# Patient Record
Sex: FEMALE | Race: WHITE | NOT HISPANIC OR LATINO | Employment: OTHER | URBAN - METROPOLITAN AREA
[De-identification: names, ages, dates, MRNs, and addresses within clinical notes are randomized per-mention and may not be internally consistent; named-entity substitution may affect disease eponyms.]

---

## 2023-10-02 ENCOUNTER — LAB (OUTPATIENT)
Dept: LAB | Facility: LAB | Age: 83
End: 2023-10-02
Payer: MEDICARE

## 2023-10-02 DIAGNOSIS — I10 ESSENTIAL (PRIMARY) HYPERTENSION: Primary | ICD-10-CM

## 2023-10-02 DIAGNOSIS — E11.9 TYPE 2 DIABETES MELLITUS WITHOUT COMPLICATIONS (MULTI): ICD-10-CM

## 2023-10-02 DIAGNOSIS — E78.5 HYPERLIPIDEMIA, UNSPECIFIED: ICD-10-CM

## 2023-10-02 LAB
ALBUMIN SERPL BCP-MCNC: 4.4 G/DL (ref 3.4–5)
ALP SERPL-CCNC: 81 U/L (ref 33–136)
ALT SERPL W P-5'-P-CCNC: 12 U/L (ref 7–45)
ANION GAP SERPL CALC-SCNC: 12 MMOL/L (ref 10–20)
AST SERPL W P-5'-P-CCNC: 17 U/L (ref 9–39)
BILIRUB SERPL-MCNC: 0.5 MG/DL (ref 0–1.2)
BUN SERPL-MCNC: 18 MG/DL (ref 6–23)
CALCIUM SERPL-MCNC: 9.3 MG/DL (ref 8.6–10.3)
CHLORIDE SERPL-SCNC: 96 MMOL/L (ref 98–107)
CHOLEST SERPL-MCNC: 224 MG/DL (ref 0–199)
CHOLESTEROL/HDL RATIO: 3.1
CO2 SERPL-SCNC: 31 MMOL/L (ref 21–32)
CREAT SERPL-MCNC: 0.63 MG/DL (ref 0.5–1.05)
ERYTHROCYTE [DISTWIDTH] IN BLOOD BY AUTOMATED COUNT: 13.2 % (ref 11.5–14.5)
GFR SERPL CREATININE-BSD FRML MDRD: 88 ML/MIN/1.73M*2
GLUCOSE SERPL-MCNC: 77 MG/DL (ref 74–99)
HCT VFR BLD AUTO: 40.9 % (ref 36–46)
HDLC SERPL-MCNC: 71.7 MG/DL
HGB BLD-MCNC: 13.2 G/DL (ref 12–16)
LDLC SERPL CALC-MCNC: 133 MG/DL (ref 140–190)
MCH RBC QN AUTO: 29 PG (ref 26–34)
MCHC RBC AUTO-ENTMCNC: 32.3 G/DL (ref 32–36)
MCV RBC AUTO: 90 FL (ref 80–100)
NON HDL CHOLESTEROL: 152 MG/DL (ref 0–149)
NRBC BLD-RTO: 0 /100 WBCS (ref 0–0)
PLATELET # BLD AUTO: 208 X10*3/UL (ref 150–450)
PMV BLD AUTO: 10.6 FL (ref 7.5–11.5)
POTASSIUM SERPL-SCNC: 4.3 MMOL/L (ref 3.5–5.3)
PROT SERPL-MCNC: 7 G/DL (ref 6.4–8.2)
RBC # BLD AUTO: 4.55 X10*6/UL (ref 4–5.2)
SODIUM SERPL-SCNC: 135 MMOL/L (ref 136–145)
TRIGL SERPL-MCNC: 97 MG/DL (ref 0–149)
VLDL: 19 MG/DL (ref 0–40)
WBC # BLD AUTO: 9.3 X10*3/UL (ref 4.4–11.3)

## 2023-10-02 PROCEDURE — 36415 COLL VENOUS BLD VENIPUNCTURE: CPT

## 2023-10-03 LAB — TSH SERPL-ACNC: 2.03 MIU/L (ref 0.44–3.98)

## 2024-06-18 ENCOUNTER — TELEPHONE (OUTPATIENT)
Dept: PRIMARY CARE | Facility: CLINIC | Age: 84
End: 2024-06-18
Payer: MEDICARE

## 2024-06-18 NOTE — TELEPHONE ENCOUNTER
Rachel called and said that her hands have been numb for years.  Over the weekend at [facility] she had to play the piano instead of the keyboard and her hands are on fire now.  She kept saying her hands and fingers are on fire.  Not sure if we should send her to a specialty?  Or schedule with you?    I did advise the patient to go to urgent care if the pain was unbearable.

## 2024-06-19 ENCOUNTER — HOSPITAL ENCOUNTER (EMERGENCY)
Facility: HOSPITAL | Age: 84
Discharge: HOME | End: 2024-06-19
Payer: MEDICARE

## 2024-06-19 VITALS
HEIGHT: 65 IN | TEMPERATURE: 98.1 F | WEIGHT: 154 LBS | RESPIRATION RATE: 18 BRPM | BODY MASS INDEX: 25.66 KG/M2 | OXYGEN SATURATION: 95 % | HEART RATE: 90 BPM | SYSTOLIC BLOOD PRESSURE: 153 MMHG | DIASTOLIC BLOOD PRESSURE: 94 MMHG

## 2024-06-19 DIAGNOSIS — G56.02 CARPAL TUNNEL SYNDROME OF LEFT WRIST: Primary | ICD-10-CM

## 2024-06-19 PROCEDURE — 99282 EMERGENCY DEPT VISIT SF MDM: CPT

## 2024-06-19 ASSESSMENT — PAIN - FUNCTIONAL ASSESSMENT: PAIN_FUNCTIONAL_ASSESSMENT: 0-10

## 2024-06-19 ASSESSMENT — COLUMBIA-SUICIDE SEVERITY RATING SCALE - C-SSRS
2. HAVE YOU ACTUALLY HAD ANY THOUGHTS OF KILLING YOURSELF?: NO
1. IN THE PAST MONTH, HAVE YOU WISHED YOU WERE DEAD OR WISHED YOU COULD GO TO SLEEP AND NOT WAKE UP?: NO
6. HAVE YOU EVER DONE ANYTHING, STARTED TO DO ANYTHING, OR PREPARED TO DO ANYTHING TO END YOUR LIFE?: NO

## 2024-06-19 ASSESSMENT — PAIN SCALES - GENERAL: PAINLEVEL_OUTOF10: 5 - MODERATE PAIN

## 2024-06-19 NOTE — ED PROVIDER NOTES
HPI   Chief Complaint   Patient presents with    Hand Pain     Pt states she has burning in the fingers on her left hand that has been getting worse for a couple days.  States she has a pinched nerve and normally has numbness in the fingers.  Has been using topical cream and tylenol with some relief.        HPI  See my MDM                  Christine Coma Scale Score: 15                     Patient History   No past medical history on file.  Past Surgical History:   Procedure Laterality Date    MR HEAD ANGIO WO IV CONTRAST  7/15/2012    MR HEAD ANGIO WO IV CONTRAST [facility]    MR HEAD ANGIO WO IV CONTRAST  1/11/2013    MR HEAD ANGIO WO IV CONTRAST [facility]     No family history on file.  Social History     Tobacco Use    Smoking status: Not on file    Smokeless tobacco: Not on file   Substance Use Topics    Alcohol use: Not on file    Drug use: Not on file       Physical Exam   ED Triage Vitals [06/19/24 1344]   Temperature Heart Rate Respirations BP   36.7 °C (98.1 °F) 90 18 (!) 153/94      Pulse Ox Temp src Heart Rate Source Patient Position   95 % -- -- --      BP Location FiO2 (%)     -- --       Physical Exam  CONSTITUTIONAL: Vital signs reviewed as charted, well-developed and in no distress  Eyes: Extraocular muscles are intact. Pupils equal round and reactive to light. Conjunctiva are pink.    ENT: Mucous membranes are moist. Tongue in the midline. Pharynx was without erythema or exudates, uvula midline  LUNGS: Breath sounds equal and clear to auscultation. Good air exchange, no wheezes rales or retractions, pulse oximetry is charted.  HEART: Regular rate and rhythm without murmur thrill or rub, strong tones, auscultation is normal.  ABDOMEN: Soft and nontender without guarding rebound rigidity or mass. Bowel sounds are present and normal in all quadrants. There is no palpable masses or aneurysms identified. No hepatosplenomegaly, normal abdominal exam.  Neuro: The patient is awake, alert  and oriented ×3. Moving all 4 extremities and answering questions appropriately.   MUSCULOSKELETAL: There is no ecchymosis edema or obvious deformity of the left upper   Extremity.  Motor sensation pulses are intact distally.  Positive Phalen's test on the left.  PSYCH: Awake alert oriented, normal mood and affect.  Skin:  Dry, normal color, warm to the touch, no rash present.      ED Course & MDM   Diagnoses as of 06/19/24 1453   Carpal tunnel syndrome of left wrist       Medical Decision Making  History obtained from: patient    Vital signs, nursing notes, current medications, past medical history, Surgical history, allergies, social history, family History were reviewed.         HPI:  Patient 84-year-old presenting emergency room today for evaluation of left hand burning sensation.  Speaking deeper with her that the burning sensations in the first second and third fingers.  She normally has tingling in his fingers but after playing the piano for 2 hours on Sunday at [employer] and became a burning sensation.  Denies any traumatic injury.  Denies dizziness, chest pain, shortness of breath, abdominal pain or lower extremity edema.  Nontoxic well-appearing      10 point ROS was reviewed and negative except Noted above in HPI.  DDX: as listed above          MDM Summary/considerations:  Labs Reviewed - No data to display  No orders to display     Medications - No data to display  There are no discharge medications for this patient.      I estimate there is LOW risk for COMPARTMENT SYNDROME, DEEP VENOUS THROMBOSIS, SEPTIC ARTHRITIS, TENDON OR NEUROVASCULAR INJURY, thus I consider the discharge disposition reasonable. We have discussed the diagnosis and risks, and we agree with discharging home to follow-up with their primary doctor or the referral orthopedist. We also discussed returning to the Emergency Department immediately if new or worsening symptoms occur. We have discussed the symptoms which aremost concerning  (e.g., changing or worsening pain, numbness, weakness) that necessitates immediate return.    Patient with a positive Phalen's test consistent with carpal tunnel syndrome.  Placed into a wrist cock-up splint by nursing staff will follow with orthopedic hand surgery referral was given.  Discharged home stable condition.    All of the patient's questions were answered to the best of my ability.  Patient states understanding that they have been screened for an emergency today and we have not found any etiology of symptoms that requires emergent treatment or admission to the hospital at this point. They understand that they have not had definitive care day and require follow-up for treatment of their condition. They also state understanding that they may have an emergent condition that may potentially have not of detected at this visit and they must return to the emergency department if they develop any worsening of symptoms or new complaints.      I have evaluated this patient, my supervising physician was available for consultation.        Critical Care: Not warranted at this time        This chart was completed using voice recognition transcription software. Please excuse any errors of transcription including grammatical, punctuation, syntax and spelling errors.  Please contact me with any questions regarding this chart.    Procedure  Procedures     Levon Kenney, HAMMAD-WILLAM  06/19/24 8278

## 2024-06-19 NOTE — TELEPHONE ENCOUNTER
Called patient and made aware.  She stated that she doesn't have a ride at the time and that her  suggested soaking her hands in ice water.  I reminded her that Dr. Pichardo thought that it would be best if she could get in to urgent care just to make sure that every thing was okay.

## 2024-06-24 ENCOUNTER — HOSPITAL ENCOUNTER (EMERGENCY)
Facility: HOSPITAL | Age: 84
Discharge: HOME | End: 2024-06-24
Attending: STUDENT IN AN ORGANIZED HEALTH CARE EDUCATION/TRAINING PROGRAM
Payer: MEDICARE

## 2024-06-24 ENCOUNTER — APPOINTMENT (OUTPATIENT)
Dept: RADIOLOGY | Facility: HOSPITAL | Age: 84
End: 2024-06-24
Payer: MEDICARE

## 2024-06-24 ENCOUNTER — APPOINTMENT (OUTPATIENT)
Dept: CARDIOLOGY | Facility: HOSPITAL | Age: 84
End: 2024-06-24
Payer: MEDICARE

## 2024-06-24 VITALS
BODY MASS INDEX: 25.66 KG/M2 | HEIGHT: 65 IN | SYSTOLIC BLOOD PRESSURE: 144 MMHG | TEMPERATURE: 97.9 F | RESPIRATION RATE: 16 BRPM | WEIGHT: 154 LBS | HEART RATE: 73 BPM | DIASTOLIC BLOOD PRESSURE: 100 MMHG | OXYGEN SATURATION: 100 %

## 2024-06-24 DIAGNOSIS — G45.9 TIA (TRANSIENT ISCHEMIC ATTACK): Primary | ICD-10-CM

## 2024-06-24 DIAGNOSIS — N39.0 UTI (URINARY TRACT INFECTION) WITH PYURIA: ICD-10-CM

## 2024-06-24 LAB
ALBUMIN SERPL BCP-MCNC: 4.2 G/DL (ref 3.4–5)
ALP SERPL-CCNC: 58 U/L (ref 33–136)
ALT SERPL W P-5'-P-CCNC: 11 U/L (ref 7–45)
ANION GAP SERPL CALC-SCNC: 10 MMOL/L (ref 10–20)
APPEARANCE UR: ABNORMAL
AST SERPL W P-5'-P-CCNC: 14 U/L (ref 9–39)
BASOPHILS # BLD AUTO: 0.04 X10*3/UL (ref 0–0.1)
BASOPHILS NFR BLD AUTO: 0.5 %
BILIRUB SERPL-MCNC: 0.4 MG/DL (ref 0–1.2)
BILIRUB UR STRIP.AUTO-MCNC: NEGATIVE MG/DL
BNP SERPL-MCNC: 306 PG/ML (ref 0–99)
BUN SERPL-MCNC: 24 MG/DL (ref 6–23)
CALCIUM SERPL-MCNC: 9.2 MG/DL (ref 8.6–10.3)
CARDIAC TROPONIN I PNL SERPL HS: 10 NG/L (ref 0–13)
CARDIAC TROPONIN I PNL SERPL HS: 9 NG/L (ref 0–13)
CHLORIDE SERPL-SCNC: 105 MMOL/L (ref 98–107)
CO2 SERPL-SCNC: 29 MMOL/L (ref 21–32)
COLOR UR: ABNORMAL
CREAT SERPL-MCNC: 0.74 MG/DL (ref 0.5–1.05)
EGFRCR SERPLBLD CKD-EPI 2021: 80 ML/MIN/1.73M*2
EOSINOPHIL # BLD AUTO: 0.09 X10*3/UL (ref 0–0.4)
EOSINOPHIL NFR BLD AUTO: 1.1 %
ERYTHROCYTE [DISTWIDTH] IN BLOOD BY AUTOMATED COUNT: 13.2 % (ref 11.5–14.5)
GLUCOSE SERPL-MCNC: 93 MG/DL (ref 74–99)
GLUCOSE UR STRIP.AUTO-MCNC: NORMAL MG/DL
HCT VFR BLD AUTO: 37.6 % (ref 36–46)
HGB BLD-MCNC: 12.1 G/DL (ref 12–16)
IMM GRANULOCYTES # BLD AUTO: 0.03 X10*3/UL (ref 0–0.5)
IMM GRANULOCYTES NFR BLD AUTO: 0.4 % (ref 0–0.9)
KETONES UR STRIP.AUTO-MCNC: NEGATIVE MG/DL
LEUKOCYTE ESTERASE UR QL STRIP.AUTO: ABNORMAL
LYMPHOCYTES # BLD AUTO: 1.9 X10*3/UL (ref 0.8–3)
LYMPHOCYTES NFR BLD AUTO: 23.2 %
MAGNESIUM SERPL-MCNC: 2.18 MG/DL (ref 1.6–2.4)
MCH RBC QN AUTO: 29.1 PG (ref 26–34)
MCHC RBC AUTO-ENTMCNC: 32.2 G/DL (ref 32–36)
MCV RBC AUTO: 90 FL (ref 80–100)
MONOCYTES # BLD AUTO: 0.71 X10*3/UL (ref 0.05–0.8)
MONOCYTES NFR BLD AUTO: 8.7 %
MUCOUS THREADS #/AREA URNS AUTO: ABNORMAL /LPF
NEUTROPHILS # BLD AUTO: 5.42 X10*3/UL (ref 1.6–5.5)
NEUTROPHILS NFR BLD AUTO: 66.1 %
NITRITE UR QL STRIP.AUTO: ABNORMAL
NRBC BLD-RTO: 0 /100 WBCS (ref 0–0)
PH UR STRIP.AUTO: 6.5 [PH]
PLATELET # BLD AUTO: 180 X10*3/UL (ref 150–450)
POTASSIUM SERPL-SCNC: 4.1 MMOL/L (ref 3.5–5.3)
PROT SERPL-MCNC: 6.9 G/DL (ref 6.4–8.2)
PROT UR STRIP.AUTO-MCNC: ABNORMAL MG/DL
RBC # BLD AUTO: 4.16 X10*6/UL (ref 4–5.2)
RBC # UR STRIP.AUTO: ABNORMAL /UL
RBC #/AREA URNS AUTO: ABNORMAL /HPF
SODIUM SERPL-SCNC: 140 MMOL/L (ref 136–145)
SP GR UR STRIP.AUTO: 1.03
SQUAMOUS #/AREA URNS AUTO: ABNORMAL /HPF
UROBILINOGEN UR STRIP.AUTO-MCNC: NORMAL MG/DL
WBC # BLD AUTO: 8.2 X10*3/UL (ref 4.4–11.3)
WBC #/AREA URNS AUTO: ABNORMAL /HPF

## 2024-06-24 PROCEDURE — 96365 THER/PROPH/DIAG IV INF INIT: CPT

## 2024-06-24 PROCEDURE — 36415 COLL VENOUS BLD VENIPUNCTURE: CPT | Performed by: STUDENT IN AN ORGANIZED HEALTH CARE EDUCATION/TRAINING PROGRAM

## 2024-06-24 PROCEDURE — 2500000004 HC RX 250 GENERAL PHARMACY W/ HCPCS (ALT 636 FOR OP/ED): Performed by: STUDENT IN AN ORGANIZED HEALTH CARE EDUCATION/TRAINING PROGRAM

## 2024-06-24 PROCEDURE — 70450 CT HEAD/BRAIN W/O DYE: CPT

## 2024-06-24 PROCEDURE — 81001 URINALYSIS AUTO W/SCOPE: CPT | Performed by: STUDENT IN AN ORGANIZED HEALTH CARE EDUCATION/TRAINING PROGRAM

## 2024-06-24 PROCEDURE — 87086 URINE CULTURE/COLONY COUNT: CPT | Mod: GEALAB | Performed by: STUDENT IN AN ORGANIZED HEALTH CARE EDUCATION/TRAINING PROGRAM

## 2024-06-24 PROCEDURE — 84484 ASSAY OF TROPONIN QUANT: CPT | Performed by: STUDENT IN AN ORGANIZED HEALTH CARE EDUCATION/TRAINING PROGRAM

## 2024-06-24 PROCEDURE — 80053 COMPREHEN METABOLIC PANEL: CPT | Performed by: STUDENT IN AN ORGANIZED HEALTH CARE EDUCATION/TRAINING PROGRAM

## 2024-06-24 PROCEDURE — 83880 ASSAY OF NATRIURETIC PEPTIDE: CPT | Performed by: STUDENT IN AN ORGANIZED HEALTH CARE EDUCATION/TRAINING PROGRAM

## 2024-06-24 PROCEDURE — 93005 ELECTROCARDIOGRAM TRACING: CPT

## 2024-06-24 PROCEDURE — 84484 ASSAY OF TROPONIN QUANT: CPT | Mod: 91 | Performed by: STUDENT IN AN ORGANIZED HEALTH CARE EDUCATION/TRAINING PROGRAM

## 2024-06-24 PROCEDURE — 99285 EMERGENCY DEPT VISIT HI MDM: CPT | Mod: 25

## 2024-06-24 PROCEDURE — 85025 COMPLETE CBC W/AUTO DIFF WBC: CPT | Performed by: STUDENT IN AN ORGANIZED HEALTH CARE EDUCATION/TRAINING PROGRAM

## 2024-06-24 PROCEDURE — 83735 ASSAY OF MAGNESIUM: CPT | Performed by: STUDENT IN AN ORGANIZED HEALTH CARE EDUCATION/TRAINING PROGRAM

## 2024-06-24 PROCEDURE — 70450 CT HEAD/BRAIN W/O DYE: CPT | Performed by: RADIOLOGY

## 2024-06-24 RX ORDER — CEPHALEXIN 500 MG/1
500 CAPSULE ORAL 2 TIMES DAILY
Qty: 14 CAPSULE | Refills: 0 | Status: SHIPPED | OUTPATIENT
Start: 2024-06-24 | End: 2024-07-01

## 2024-06-24 RX ORDER — CEFTRIAXONE 1 G/50ML
1 INJECTION, SOLUTION INTRAVENOUS ONCE
Status: COMPLETED | OUTPATIENT
Start: 2024-06-24 | End: 2024-06-24

## 2024-06-24 ASSESSMENT — LIFESTYLE VARIABLES
HAVE YOU EVER FELT YOU SHOULD CUT DOWN ON YOUR DRINKING: NO
EVER HAD A DRINK FIRST THING IN THE MORNING TO STEADY YOUR NERVES TO GET RID OF A HANGOVER: NO
TOTAL SCORE: 0
HAVE PEOPLE ANNOYED YOU BY CRITICIZING YOUR DRINKING: NO
EVER FELT BAD OR GUILTY ABOUT YOUR DRINKING: NO

## 2024-06-24 ASSESSMENT — PAIN SCALES - GENERAL: PAINLEVEL_OUTOF10: 0 - NO PAIN

## 2024-06-24 ASSESSMENT — PAIN - FUNCTIONAL ASSESSMENT: PAIN_FUNCTIONAL_ASSESSMENT: 0-10

## 2024-06-24 ASSESSMENT — COLUMBIA-SUICIDE SEVERITY RATING SCALE - C-SSRS
2. HAVE YOU ACTUALLY HAD ANY THOUGHTS OF KILLING YOURSELF?: NO
6. HAVE YOU EVER DONE ANYTHING, STARTED TO DO ANYTHING, OR PREPARED TO DO ANYTHING TO END YOUR LIFE?: NO
1. IN THE PAST MONTH, HAVE YOU WISHED YOU WERE DEAD OR WISHED YOU COULD GO TO SLEEP AND NOT WAKE UP?: NO

## 2024-06-24 ASSESSMENT — ACTIVITIES OF DAILY LIVING (ADL): LACK_OF_TRANSPORTATION: NO

## 2024-06-24 NOTE — ED PROVIDER NOTES
HPI   Chief Complaint   Patient presents with    stroke symptoms     Came back from [place] 4 weeks ago and shortly after started having slurred speech and uncontrolled left arm movement.       HPI     Patient is an 84-year-old female presenting to the emergency department today in the setting of strokelike symptoms, fully resolved.  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  No residual deficits from this.  Patient was in [place] until 4 weeks ago when she returned.  She had symptoms of some possible slurred speech which is noted 3 weeks prior and some occasional left arm tremor.  Her outpatient provider recommended coming in for TIA workup.  Her speech deficit has fully resolved.  She denies any pain at this time.  No recent falls, trauma.  No fevers, chills, cough.               Tullos Coma Scale Score: 15                     Patient History   No past medical history on file.  Past Surgical History:   Procedure Laterality Date    MR HEAD ANGIO WO IV CONTRAST  7/15/2012    MR HEAD ANGIO WO IV CONTRAST [facility]    MR HEAD ANGIO WO IV CONTRAST  1/11/2013    MR HEAD ANGIO WO IV CONTRAST [facility]     No family history on file.  Social History     Tobacco Use    Smoking status: Not on file    Smokeless tobacco: Not on file   Substance Use Topics    Alcohol use: Not on file    Drug use: Not on file       Physical Exam   ED Triage Vitals [06/24/24 1403]   Temperature Heart Rate Respirations BP   36.6 °C (97.9 °F) 82 18 139/65      Pulse Ox Temp Source Heart Rate Source Patient Position   96 % Skin Monitor Sitting      BP Location FiO2 (%)     Left arm --       Physical Exam  Vitals and nursing note reviewed.   Constitutional:       General: She is not in acute distress.     Appearance: She is well-developed.   HENT:      Head: Normocephalic and atraumatic.   Eyes:      Conjunctiva/sclera: Conjunctivae normal.   Cardiovascular:      Rate and Rhythm: Normal rate and regular rhythm.       Heart sounds: No murmur heard.  Pulmonary:      Effort: Pulmonary effort is normal. No respiratory distress.      Breath sounds: Normal breath sounds.   Abdominal:      Palpations: Abdomen is soft.      Tenderness: There is no abdominal tenderness.   Musculoskeletal:         General: No swelling.      Cervical back: Neck supple.   Skin:     General: Skin is warm and dry.      Capillary Refill: Capillary refill takes less than 2 seconds.   Neurological:      General: No focal deficit present.      Mental Status: She is alert and oriented to person, place, and time.      Cranial Nerves: No cranial nerve deficit.      Sensory: No sensory deficit.      Motor: No weakness.      Coordination: Coordination normal.   Psychiatric:         Mood and Affect: Mood normal.         ED Course & MDM   Diagnoses as of 06/24/24 2151   TIA (transient ischemic attack)   UTI (urinary tract infection) with pyuria       Medical Decision Making  84-year-old female presenting as above pain arrival hemodynamically stable no acute distress on bedside assessment alert and oriented x 3 and conversant.  Anxious scale of 0.  Distractible tremor to the left arm.  Will plan for an evaluation for presenting complaints and a TIA workup including cardiac enzymes, EKG, CT head, urinalysis.  Pending results given resolution of symptoms we will discuss options for disposition.  Patient is established with notes outpatient PCP.    EKG is interpreted by myself: Rate 75, , QRS 72, QTc 453.  No STEMI or ischemic changes noted.  Impression normal sinus rhythm.    Patient had a full workup including CT, labs.  Notable findings for a urinary tract infection given a first dose of IV antibiotics here.  Discussed a course of treatment and outpatient follow-up.  Given full resolution of symptoms otherwise at this time I do believe she is appropriate for home-going.  If she has any recurrence of symptoms she should have prompt reevaluation.  Would expect  improvement of some of h with a course of antibiotics.  Patient and patient's daughter at bedside are in agreement with plan.  Will be discharged home.    Procedure  Procedures     She Kirby MD  06/24/24 9529

## 2024-06-24 NOTE — PROGRESS NOTES
06/24/24 1534   Discharge Planning   Living Arrangements Alone   Support Systems Children   Assistance Needed A&OX3; independent with ADLs with rollator; drives but not recently; room air baseline and currently room air   Type of Residence Private residence   Number of Stairs to Enter Residence 7   Number of Stairs Within Residence 0   Do you have animals or pets at home? No   Who is requesting discharge planning? Provider   Patient expects to be discharged to: Patient is open to [facility] if recommended.   Does the patient need discharge transport arranged? Yes   RoundTrip coordination needed? Yes   Has discharge transport been arranged? No   Financial Resource Strain   How hard is it for you to pay for the very basics like food, housing, medical care, and heating? Not hard   Housing Stability   In the last 12 months, was there a time when you were not able to pay the mortgage or rent on time? N   In the last 12 months, how many places have you lived? 1   In the last 12 months, was there a time when you did not have a steady place to sleep or slept in a shelter (including now)? N   Transportation Needs   In the past 12 months, has lack of transportation kept you from medical appointments or from getting medications? no   In the past 12 months, has lack of transportation kept you from meetings, work, or from getting things needed for daily living? No     06/24/2024 1537pm  Spoke with patient and patient's daughter in law (Tova) bedside in ED.

## 2024-06-25 LAB — HOLD SPECIMEN: NORMAL

## 2024-06-27 ENCOUNTER — LAB (OUTPATIENT)
Dept: LAB | Facility: LAB | Age: 84
End: 2024-06-27
Payer: MEDICARE

## 2024-06-27 DIAGNOSIS — I63.10 CEREBRAL INFARCTION DUE TO EMBOLISM OF UNSPECIFIED PRECEREBRAL ARTERY (MULTI): Primary | ICD-10-CM

## 2024-06-27 LAB
ALBUMIN SERPL BCP-MCNC: 4.3 G/DL (ref 3.4–5)
ALP SERPL-CCNC: 60 U/L (ref 33–136)
ALT SERPL W P-5'-P-CCNC: 11 U/L (ref 7–45)
ANION GAP SERPL CALC-SCNC: 11 MMOL/L (ref 10–20)
AST SERPL W P-5'-P-CCNC: 13 U/L (ref 9–39)
BACTERIA UR CULT: ABNORMAL
BASOPHILS # BLD AUTO: 0.06 X10*3/UL (ref 0–0.1)
BASOPHILS NFR BLD AUTO: 1 %
BILIRUB SERPL-MCNC: 0.5 MG/DL (ref 0–1.2)
BUN SERPL-MCNC: 20 MG/DL (ref 6–23)
CALCIUM SERPL-MCNC: 9.3 MG/DL (ref 8.6–10.3)
CHLORIDE SERPL-SCNC: 105 MMOL/L (ref 98–107)
CHOLEST SERPL-MCNC: 201 MG/DL (ref 0–199)
CHOLESTEROL/HDL RATIO: 3.2
CO2 SERPL-SCNC: 28 MMOL/L (ref 21–32)
CREAT SERPL-MCNC: 0.66 MG/DL (ref 0.5–1.05)
EGFRCR SERPLBLD CKD-EPI 2021: 87 ML/MIN/1.73M*2
EOSINOPHIL # BLD AUTO: 0.15 X10*3/UL (ref 0–0.4)
EOSINOPHIL NFR BLD AUTO: 2.5 %
ERYTHROCYTE [DISTWIDTH] IN BLOOD BY AUTOMATED COUNT: 13.5 % (ref 11.5–14.5)
EST. AVERAGE GLUCOSE BLD GHB EST-MCNC: 105 MG/DL
GLUCOSE SERPL-MCNC: 96 MG/DL (ref 74–99)
HBA1C MFR BLD: 5.3 %
HCT VFR BLD AUTO: 39.7 % (ref 36–46)
HDLC SERPL-MCNC: 62.7 MG/DL
HGB BLD-MCNC: 12.8 G/DL (ref 12–16)
IMM GRANULOCYTES # BLD AUTO: 0.01 X10*3/UL (ref 0–0.5)
IMM GRANULOCYTES NFR BLD AUTO: 0.2 % (ref 0–0.9)
LDLC SERPL CALC-MCNC: 115 MG/DL
LYMPHOCYTES # BLD AUTO: 1.51 X10*3/UL (ref 0.8–3)
LYMPHOCYTES NFR BLD AUTO: 24.7 %
MCH RBC QN AUTO: 29.6 PG (ref 26–34)
MCHC RBC AUTO-ENTMCNC: 32.2 G/DL (ref 32–36)
MCV RBC AUTO: 92 FL (ref 80–100)
MONOCYTES # BLD AUTO: 0.58 X10*3/UL (ref 0.05–0.8)
MONOCYTES NFR BLD AUTO: 9.5 %
NEUTROPHILS # BLD AUTO: 3.8 X10*3/UL (ref 1.6–5.5)
NEUTROPHILS NFR BLD AUTO: 62.1 %
NON HDL CHOLESTEROL: 138 MG/DL (ref 0–149)
NRBC BLD-RTO: 0 /100 WBCS (ref 0–0)
PLATELET # BLD AUTO: 175 X10*3/UL (ref 150–450)
POTASSIUM SERPL-SCNC: 4.1 MMOL/L (ref 3.5–5.3)
PROT SERPL-MCNC: 6.4 G/DL (ref 6.4–8.2)
RBC # BLD AUTO: 4.33 X10*6/UL (ref 4–5.2)
SODIUM SERPL-SCNC: 140 MMOL/L (ref 136–145)
TREPONEMA PALLIDUM IGG+IGM AB [PRESENCE] IN SERUM OR PLASMA BY IMMUNOASSAY: NONREACTIVE
TRIGL SERPL-MCNC: 118 MG/DL (ref 0–149)
TSH SERPL-ACNC: 2.19 MIU/L (ref 0.44–3.98)
VIT B12 SERPL-MCNC: 480 PG/ML (ref 211–911)
VLDL: 24 MG/DL (ref 0–40)
WBC # BLD AUTO: 6.1 X10*3/UL (ref 4.4–11.3)

## 2024-06-27 PROCEDURE — 83036 HEMOGLOBIN GLYCOSYLATED A1C: CPT

## 2024-06-27 PROCEDURE — 82607 VITAMIN B-12: CPT

## 2024-06-27 PROCEDURE — 84443 ASSAY THYROID STIM HORMONE: CPT

## 2024-06-27 PROCEDURE — 85025 COMPLETE CBC W/AUTO DIFF WBC: CPT

## 2024-06-27 PROCEDURE — 86780 TREPONEMA PALLIDUM: CPT

## 2024-06-27 PROCEDURE — 80053 COMPREHEN METABOLIC PANEL: CPT

## 2024-06-27 PROCEDURE — 80061 LIPID PANEL: CPT

## 2024-06-27 PROCEDURE — 36415 COLL VENOUS BLD VENIPUNCTURE: CPT

## 2024-06-28 ENCOUNTER — TELEPHONE (OUTPATIENT)
Dept: PHARMACY | Facility: HOSPITAL | Age: 84
End: 2024-06-28
Payer: MEDICARE

## 2024-06-28 ENCOUNTER — TELEPHONE (OUTPATIENT)
Dept: PRIMARY CARE | Facility: CLINIC | Age: 84
End: 2024-06-28
Payer: MEDICARE

## 2024-06-28 PROBLEM — I48.91 ATRIAL FIBRILLATION (MULTI): Status: ACTIVE | Noted: 2024-06-28

## 2024-06-28 PROBLEM — R55 SYNCOPE AND COLLAPSE: Status: ACTIVE | Noted: 2024-06-28

## 2024-06-28 PROBLEM — I10 ESSENTIAL HYPERTENSION: Status: ACTIVE | Noted: 2024-06-28

## 2024-06-28 LAB
ATRIAL RATE: 75 BPM
P AXIS: 61 DEGREES
P OFFSET: 211 MS
P ONSET: 158 MS
PR INTERVAL: 136 MS
Q ONSET: 226 MS
QRS COUNT: 12 BEATS
QRS DURATION: 72 MS
QT INTERVAL: 406 MS
QTC CALCULATION(BAZETT): 453 MS
QTC FREDERICIA: 437 MS
R AXIS: 13 DEGREES
T AXIS: 39 DEGREES
T OFFSET: 429 MS
VENTRICULAR RATE: 75 BPM

## 2024-06-28 RX ORDER — ACETAMINOPHEN 325 MG/1
325 TABLET ORAL EVERY 4 HOURS PRN
COMMUNITY
Start: 2022-10-13

## 2024-06-28 RX ORDER — AMLODIPINE BESYLATE 2.5 MG/1
2.5 TABLET ORAL DAILY
COMMUNITY
Start: 2020-07-07

## 2024-06-28 NOTE — TELEPHONE ENCOUNTER
Dr. Pichardo wrote out a script.  Called daughter and she will be picking up the prescription. Scanned into chart

## 2024-06-28 NOTE — TELEPHONE ENCOUNTER
Patients daughter called asking if you would be willing to write a prescription for her mom for a wheelchair

## 2024-06-28 NOTE — PROGRESS NOTES
EDPD Note: Antibiotics Reviewed and Warranted    Contacted Ms. Sol Gomez regarding a positive urine culture/result that was taken during their recent emergency room visit. I completed education with patient . The patient is being treated appropriately with cephalexin 500 mg BID x 7 days given symptom improvment.     Patient presented to the ED for strokelike symptoms that fully resolved. Did not endorse urinary symptoms in the ED note.   Patient currently reports urine discoloration and occasional retention has improved a bit but she has incontinence so retention can be difficult to gauge. She denies unusual urinary frequency or urgency. Counseled the patient to follow-up with her provider if her symptoms persist/worsen/return.     Susceptibility data from last 90 days.  Collected Specimen Info Organism Ampicillin Cefazolin Cefazolin (uncomplicated UTIs only) Ciprofloxacin Gentamicin Nitrofurantoin Piperacillin/Tazobactam Tobramycin Trimethoprim/Sulfamethoxazole   06/24/24 Urine from Clean Catch/Voided Escherichia coli  S  S  S  S  R  S  S  R  S        No further follow up needed from EDPD Team.     Karyna García, PharmD

## 2024-07-03 ENCOUNTER — APPOINTMENT (OUTPATIENT)
Dept: RADIOLOGY | Facility: HOSPITAL | Age: 84
DRG: 643 | End: 2024-07-03
Payer: MEDICARE

## 2024-07-03 ENCOUNTER — HOSPITAL ENCOUNTER (INPATIENT)
Facility: HOSPITAL | Age: 84
DRG: 643 | End: 2024-07-03
Attending: EMERGENCY MEDICINE | Admitting: INTERNAL MEDICINE
Payer: MEDICARE

## 2024-07-03 ENCOUNTER — APPOINTMENT (OUTPATIENT)
Dept: CARDIOLOGY | Facility: HOSPITAL | Age: 84
DRG: 643 | End: 2024-07-03
Payer: MEDICARE

## 2024-07-03 ENCOUNTER — APPOINTMENT (OUTPATIENT)
Dept: CARDIOLOGY | Facility: CLINIC | Age: 84
End: 2024-07-03
Payer: MEDICARE

## 2024-07-03 DIAGNOSIS — R29.6 FREQUENT FALLS: ICD-10-CM

## 2024-07-03 DIAGNOSIS — I49.3 FREQUENT PVCS: ICD-10-CM

## 2024-07-03 DIAGNOSIS — R55 SYNCOPE AND COLLAPSE: ICD-10-CM

## 2024-07-03 DIAGNOSIS — E87.1 HYPONATREMIA: Primary | ICD-10-CM

## 2024-07-03 DIAGNOSIS — G93.41 ACUTE METABOLIC ENCEPHALOPATHY: ICD-10-CM

## 2024-07-03 LAB
ALBUMIN SERPL BCP-MCNC: 3.8 G/DL (ref 3.4–5)
ALBUMIN SERPL BCP-MCNC: 4 G/DL (ref 3.4–5)
ALBUMIN SERPL BCP-MCNC: 4.4 G/DL (ref 3.4–5)
ALP SERPL-CCNC: 63 U/L (ref 33–136)
ALT SERPL W P-5'-P-CCNC: 14 U/L (ref 7–45)
ANION GAP SERPL CALC-SCNC: 12 MMOL/L (ref 10–20)
ANION GAP SERPL CALC-SCNC: 13 MMOL/L (ref 10–20)
ANION GAP SERPL CALC-SCNC: 14 MMOL/L (ref 10–20)
AORTIC VALVE MEAN GRADIENT: 5.4 MMHG
AORTIC VALVE PEAK VELOCITY: 1.57 M/S
APPEARANCE UR: ABNORMAL
AST SERPL W P-5'-P-CCNC: 18 U/L (ref 9–39)
ATRIAL RATE: 78 BPM
AV PEAK GRADIENT: 9.8 MMHG
AVA (PEAK VEL): 1.69 CM2
AVA (VTI): 1.44 CM2
BACTERIA #/AREA URNS AUTO: ABNORMAL /HPF
BASOPHILS # BLD AUTO: 0.02 X10*3/UL (ref 0–0.1)
BASOPHILS NFR BLD AUTO: 0.2 %
BILIRUB SERPL-MCNC: 0.7 MG/DL (ref 0–1.2)
BILIRUB UR STRIP.AUTO-MCNC: NEGATIVE MG/DL
BUN SERPL-MCNC: 10 MG/DL (ref 6–23)
BUN SERPL-MCNC: 12 MG/DL (ref 6–23)
BUN SERPL-MCNC: 14 MG/DL (ref 6–23)
CALCIUM SERPL-MCNC: 8.6 MG/DL (ref 8.6–10.3)
CALCIUM SERPL-MCNC: 8.7 MG/DL (ref 8.6–10.3)
CALCIUM SERPL-MCNC: 9.1 MG/DL (ref 8.6–10.3)
CARDIAC TROPONIN I PNL SERPL HS: 8 NG/L (ref 0–13)
CHLORIDE SERPL-SCNC: 89 MMOL/L (ref 98–107)
CHLORIDE SERPL-SCNC: 90 MMOL/L (ref 98–107)
CHLORIDE SERPL-SCNC: 90 MMOL/L (ref 98–107)
CHLORIDE UR-SCNC: 72 MMOL/L
CHLORIDE/CREATININE (MMOL/G) IN URINE: 72 MMOL/G CREAT (ref 38–318)
CO2 SERPL-SCNC: 23 MMOL/L (ref 21–32)
CO2 SERPL-SCNC: 24 MMOL/L (ref 21–32)
CO2 SERPL-SCNC: 27 MMOL/L (ref 21–32)
COLOR UR: ABNORMAL
CREAT SERPL-MCNC: 0.52 MG/DL (ref 0.5–1.05)
CREAT SERPL-MCNC: 0.54 MG/DL (ref 0.5–1.05)
CREAT SERPL-MCNC: 0.63 MG/DL (ref 0.5–1.05)
CREAT UR-MCNC: 99.5 MG/DL (ref 20–320)
EGFRCR SERPLBLD CKD-EPI 2021: 88 ML/MIN/1.73M*2
EGFRCR SERPLBLD CKD-EPI 2021: >90 ML/MIN/1.73M*2
EGFRCR SERPLBLD CKD-EPI 2021: >90 ML/MIN/1.73M*2
EJECTION FRACTION APICAL 4 CHAMBER: 47.1
EJECTION FRACTION: 53 %
EOSINOPHIL # BLD AUTO: 0.01 X10*3/UL (ref 0–0.4)
EOSINOPHIL NFR BLD AUTO: 0.1 %
ERYTHROCYTE [DISTWIDTH] IN BLOOD BY AUTOMATED COUNT: 13 % (ref 11.5–14.5)
GLUCOSE BLD MANUAL STRIP-MCNC: 105 MG/DL (ref 74–99)
GLUCOSE BLD MANUAL STRIP-MCNC: 113 MG/DL (ref 74–99)
GLUCOSE SERPL-MCNC: 110 MG/DL (ref 74–99)
GLUCOSE SERPL-MCNC: 115 MG/DL (ref 74–99)
GLUCOSE SERPL-MCNC: 116 MG/DL (ref 74–99)
GLUCOSE UR STRIP.AUTO-MCNC: NORMAL MG/DL
HCT VFR BLD AUTO: 36.7 % (ref 36–46)
HGB BLD-MCNC: 12.5 G/DL (ref 12–16)
HOLD SPECIMEN: NORMAL
HYALINE CASTS #/AREA URNS AUTO: ABNORMAL /LPF
IMM GRANULOCYTES # BLD AUTO: 0.05 X10*3/UL (ref 0–0.5)
IMM GRANULOCYTES NFR BLD AUTO: 0.5 % (ref 0–0.9)
INR PPP: 1.1 (ref 0.9–1.1)
KETONES UR STRIP.AUTO-MCNC: NEGATIVE MG/DL
LACTATE SERPL-SCNC: 0.8 MMOL/L (ref 0.4–2)
LEFT ATRIUM VOLUME AREA LENGTH INDEX BSA: 17.9 ML/M2
LEFT VENTRICLE INTERNAL DIMENSION DIASTOLE: 5 CM (ref 3.5–6)
LEFT VENTRICULAR OUTFLOW TRACT DIAMETER: 1.9 CM
LEUKOCYTE ESTERASE UR QL STRIP.AUTO: ABNORMAL
LYMPHOCYTES # BLD AUTO: 0.87 X10*3/UL (ref 0.8–3)
LYMPHOCYTES NFR BLD AUTO: 8.4 %
MAGNESIUM SERPL-MCNC: 1.92 MG/DL (ref 1.6–2.4)
MCH RBC QN AUTO: 29.3 PG (ref 26–34)
MCHC RBC AUTO-ENTMCNC: 34.1 G/DL (ref 32–36)
MCV RBC AUTO: 86 FL (ref 80–100)
MITRAL VALVE E/A RATIO: 0.68
MONOCYTES # BLD AUTO: 0.98 X10*3/UL (ref 0.05–0.8)
MONOCYTES NFR BLD AUTO: 9.5 %
MUCOUS THREADS #/AREA URNS AUTO: ABNORMAL /LPF
NEUTROPHILS # BLD AUTO: 8.41 X10*3/UL (ref 1.6–5.5)
NEUTROPHILS NFR BLD AUTO: 81.3 %
NITRITE UR QL STRIP.AUTO: NEGATIVE
NRBC BLD-RTO: 0 /100 WBCS (ref 0–0)
OSMOLALITY UR: 426 MOSM/KG (ref 200–1200)
P AXIS: 52 DEGREES
P OFFSET: 211 MS
P ONSET: 156 MS
PH UR STRIP.AUTO: 6.5 [PH]
PHOSPHATE SERPL-MCNC: 2.9 MG/DL (ref 2.5–4.9)
PHOSPHATE SERPL-MCNC: 3.2 MG/DL (ref 2.5–4.9)
PLATELET # BLD AUTO: 210 X10*3/UL (ref 150–450)
POTASSIUM SERPL-SCNC: 3.9 MMOL/L (ref 3.5–5.3)
POTASSIUM SERPL-SCNC: 4.1 MMOL/L (ref 3.5–5.3)
POTASSIUM SERPL-SCNC: 4.2 MMOL/L (ref 3.5–5.3)
POTASSIUM UR-SCNC: 52 MMOL/L
POTASSIUM/CREAT UR-RTO: 52 MMOL/G CREAT
PR INTERVAL: 140 MS
PROT SERPL-MCNC: 6.9 G/DL (ref 6.4–8.2)
PROT UR STRIP.AUTO-MCNC: ABNORMAL MG/DL
PROTHROMBIN TIME: 12.4 SECONDS (ref 9.8–12.8)
Q ONSET: 226 MS
QRS COUNT: 13 BEATS
QRS DURATION: 70 MS
QT INTERVAL: 414 MS
QTC CALCULATION(BAZETT): 471 MS
QTC FREDERICIA: 451 MS
R AXIS: -25 DEGREES
RBC # BLD AUTO: 4.26 X10*6/UL (ref 4–5.2)
RBC # UR STRIP.AUTO: ABNORMAL /UL
RBC #/AREA URNS AUTO: ABNORMAL /HPF
RIGHT VENTRICLE FREE WALL PEAK S': 13 CM/S
RIGHT VENTRICLE PEAK SYSTOLIC PRESSURE: 6.9 MMHG
SARS-COV-2 RNA RESP QL NAA+PROBE: NOT DETECTED
SODIUM SERPL-SCNC: 122 MMOL/L (ref 136–145)
SODIUM SERPL-SCNC: 123 MMOL/L (ref 136–145)
SODIUM SERPL-SCNC: 125 MMOL/L (ref 136–145)
SODIUM UR-SCNC: 77 MMOL/L
SODIUM/CREAT UR-RTO: 77 MMOL/G CREAT
SP GR UR STRIP.AUTO: 1.01
SQUAMOUS #/AREA URNS AUTO: ABNORMAL /HPF
T AXIS: 31 DEGREES
T OFFSET: 433 MS
UROBILINOGEN UR STRIP.AUTO-MCNC: NORMAL MG/DL
VENTRICULAR RATE: 78 BPM
WBC # BLD AUTO: 10.3 X10*3/UL (ref 4.4–11.3)
WBC #/AREA URNS AUTO: ABNORMAL /HPF
WBC CLUMPS #/AREA URNS AUTO: ABNORMAL /HPF

## 2024-07-03 PROCEDURE — 82436 ASSAY OF URINE CHLORIDE: CPT | Mod: GEALAB

## 2024-07-03 PROCEDURE — 85610 PROTHROMBIN TIME: CPT | Performed by: PHYSICIAN ASSISTANT

## 2024-07-03 PROCEDURE — 83605 ASSAY OF LACTIC ACID: CPT | Performed by: PHYSICIAN ASSISTANT

## 2024-07-03 PROCEDURE — 84484 ASSAY OF TROPONIN QUANT: CPT | Performed by: PHYSICIAN ASSISTANT

## 2024-07-03 PROCEDURE — 85025 COMPLETE CBC W/AUTO DIFF WBC: CPT | Performed by: PHYSICIAN ASSISTANT

## 2024-07-03 PROCEDURE — 36415 COLL VENOUS BLD VENIPUNCTURE: CPT | Performed by: PHYSICIAN ASSISTANT

## 2024-07-03 PROCEDURE — 2500000001 HC RX 250 WO HCPCS SELF ADMINISTERED DRUGS (ALT 637 FOR MEDICARE OP)

## 2024-07-03 PROCEDURE — 93306 TTE W/DOPPLER COMPLETE: CPT | Performed by: STUDENT IN AN ORGANIZED HEALTH CARE EDUCATION/TRAINING PROGRAM

## 2024-07-03 PROCEDURE — 2500000004 HC RX 250 GENERAL PHARMACY W/ HCPCS (ALT 636 FOR OP/ED)

## 2024-07-03 PROCEDURE — 99223 1ST HOSP IP/OBS HIGH 75: CPT

## 2024-07-03 PROCEDURE — 70498 CT ANGIOGRAPHY NECK: CPT

## 2024-07-03 PROCEDURE — 87086 URINE CULTURE/COLONY COUNT: CPT | Mod: GEALAB | Performed by: PHYSICIAN ASSISTANT

## 2024-07-03 PROCEDURE — 93306 TTE W/DOPPLER COMPLETE: CPT

## 2024-07-03 PROCEDURE — 80069 RENAL FUNCTION PANEL: CPT | Mod: CCI

## 2024-07-03 PROCEDURE — G0378 HOSPITAL OBSERVATION PER HR: HCPCS

## 2024-07-03 PROCEDURE — 99285 EMERGENCY DEPT VISIT HI MDM: CPT | Mod: 25

## 2024-07-03 PROCEDURE — 70551 MRI BRAIN STEM W/O DYE: CPT | Performed by: STUDENT IN AN ORGANIZED HEALTH CARE EDUCATION/TRAINING PROGRAM

## 2024-07-03 PROCEDURE — 81001 URINALYSIS AUTO W/SCOPE: CPT | Performed by: PHYSICIAN ASSISTANT

## 2024-07-03 PROCEDURE — 70496 CT ANGIOGRAPHY HEAD: CPT | Performed by: STUDENT IN AN ORGANIZED HEALTH CARE EDUCATION/TRAINING PROGRAM

## 2024-07-03 PROCEDURE — 93005 ELECTROCARDIOGRAM TRACING: CPT

## 2024-07-03 PROCEDURE — 70450 CT HEAD/BRAIN W/O DYE: CPT | Performed by: RADIOLOGY

## 2024-07-03 PROCEDURE — 84550 ASSAY OF BLOOD/URIC ACID: CPT

## 2024-07-03 PROCEDURE — 70450 CT HEAD/BRAIN W/O DYE: CPT

## 2024-07-03 PROCEDURE — 83935 ASSAY OF URINE OSMOLALITY: CPT | Mod: GEALAB

## 2024-07-03 PROCEDURE — 73060 X-RAY EXAM OF HUMERUS: CPT | Mod: LEFT SIDE | Performed by: RADIOLOGY

## 2024-07-03 PROCEDURE — 87635 SARS-COV-2 COVID-19 AMP PRB: CPT | Performed by: PHYSICIAN ASSISTANT

## 2024-07-03 PROCEDURE — 70551 MRI BRAIN STEM W/O DYE: CPT

## 2024-07-03 PROCEDURE — 80053 COMPREHEN METABOLIC PANEL: CPT | Performed by: PHYSICIAN ASSISTANT

## 2024-07-03 PROCEDURE — 2550000001 HC RX 255 CONTRASTS: Performed by: INTERNAL MEDICINE

## 2024-07-03 PROCEDURE — 83930 ASSAY OF BLOOD OSMOLALITY: CPT | Mod: GEALAB

## 2024-07-03 PROCEDURE — 2500000004 HC RX 250 GENERAL PHARMACY W/ HCPCS (ALT 636 FOR OP/ED): Performed by: PHYSICIAN ASSISTANT

## 2024-07-03 PROCEDURE — 83735 ASSAY OF MAGNESIUM: CPT | Performed by: PHYSICIAN ASSISTANT

## 2024-07-03 PROCEDURE — 82947 ASSAY GLUCOSE BLOOD QUANT: CPT

## 2024-07-03 PROCEDURE — 70498 CT ANGIOGRAPHY NECK: CPT | Performed by: STUDENT IN AN ORGANIZED HEALTH CARE EDUCATION/TRAINING PROGRAM

## 2024-07-03 PROCEDURE — 73060 X-RAY EXAM OF HUMERUS: CPT | Mod: LT

## 2024-07-03 PROCEDURE — 96365 THER/PROPH/DIAG IV INF INIT: CPT

## 2024-07-03 PROCEDURE — 84100 ASSAY OF PHOSPHORUS: CPT | Performed by: INTERNAL MEDICINE

## 2024-07-03 RX ORDER — ASPIRIN 81 MG/1
81 TABLET ORAL DAILY
Status: DISCONTINUED | OUTPATIENT
Start: 2024-07-03 | End: 2024-07-03

## 2024-07-03 RX ORDER — CEFTRIAXONE 1 G/50ML
1 INJECTION, SOLUTION INTRAVENOUS ONCE
Status: COMPLETED | OUTPATIENT
Start: 2024-07-03 | End: 2024-07-03

## 2024-07-03 RX ORDER — ACETAMINOPHEN 325 MG/1
650 TABLET ORAL EVERY 6 HOURS PRN
Status: DISCONTINUED | OUTPATIENT
Start: 2024-07-03 | End: 2024-07-08 | Stop reason: HOSPADM

## 2024-07-03 RX ORDER — LORAZEPAM 2 MG/ML
0.5 INJECTION INTRAMUSCULAR ONCE
Status: DISCONTINUED | OUTPATIENT
Start: 2024-07-03 | End: 2024-07-03

## 2024-07-03 RX ORDER — HYDRALAZINE HYDROCHLORIDE 25 MG/1
25 TABLET, FILM COATED ORAL EVERY 6 HOURS PRN
Status: DISCONTINUED | OUTPATIENT
Start: 2024-07-05 | End: 2024-07-08 | Stop reason: HOSPADM

## 2024-07-03 RX ORDER — METHOCARBAMOL 500 MG/1
500 TABLET, FILM COATED ORAL EVERY 8 HOURS PRN
Status: DISCONTINUED | OUTPATIENT
Start: 2024-07-03 | End: 2024-07-04

## 2024-07-03 RX ORDER — SODIUM CHLORIDE 9 MG/ML
75 INJECTION, SOLUTION INTRAVENOUS CONTINUOUS
Status: DISCONTINUED | OUTPATIENT
Start: 2024-07-03 | End: 2024-07-03

## 2024-07-03 RX ORDER — ATORVASTATIN CALCIUM 80 MG/1
80 TABLET, FILM COATED ORAL NIGHTLY
Status: DISCONTINUED | OUTPATIENT
Start: 2024-07-03 | End: 2024-07-08 | Stop reason: HOSPADM

## 2024-07-03 RX ORDER — POLYETHYLENE GLYCOL 3350 17 G/17G
17 POWDER, FOR SOLUTION ORAL DAILY PRN
Status: DISCONTINUED | OUTPATIENT
Start: 2024-07-03 | End: 2024-07-03

## 2024-07-03 RX ORDER — LABETALOL HYDROCHLORIDE 5 MG/ML
10 INJECTION, SOLUTION INTRAVENOUS EVERY 10 MIN PRN
Status: ACTIVE | OUTPATIENT
Start: 2024-07-03 | End: 2024-07-06

## 2024-07-03 RX ORDER — CEFTRIAXONE 1 G/50ML
1 INJECTION, SOLUTION INTRAVENOUS EVERY 24 HOURS
Status: DISCONTINUED | OUTPATIENT
Start: 2024-07-04 | End: 2024-07-05

## 2024-07-03 RX ORDER — DESMOPRESSIN ACETATE 0.1 MG/1
0.1 TABLET ORAL 2 TIMES DAILY
Status: ON HOLD | COMMUNITY
End: 2024-07-08

## 2024-07-03 RX ORDER — OXYCODONE HYDROCHLORIDE 5 MG/1
2.5 TABLET ORAL EVERY 6 HOURS PRN
Status: DISCONTINUED | OUTPATIENT
Start: 2024-07-03 | End: 2024-07-04

## 2024-07-03 RX ORDER — ENOXAPARIN SODIUM 100 MG/ML
40 INJECTION SUBCUTANEOUS EVERY 24 HOURS
Status: DISCONTINUED | OUTPATIENT
Start: 2024-07-03 | End: 2024-07-08 | Stop reason: HOSPADM

## 2024-07-03 RX ORDER — AMLODIPINE BESYLATE 5 MG/1
2.5 TABLET ORAL DAILY
Status: DISCONTINUED | OUTPATIENT
Start: 2024-07-03 | End: 2024-07-03

## 2024-07-03 RX ORDER — HYDRALAZINE HYDROCHLORIDE 20 MG/ML
10 INJECTION INTRAMUSCULAR; INTRAVENOUS
Status: ACTIVE | OUTPATIENT
Start: 2024-07-03 | End: 2024-07-05

## 2024-07-03 RX ORDER — OXYCODONE HYDROCHLORIDE 5 MG/1
5 TABLET ORAL EVERY 6 HOURS PRN
Status: DISCONTINUED | OUTPATIENT
Start: 2024-07-03 | End: 2024-07-04

## 2024-07-03 RX ORDER — SODIUM CHLORIDE 9 MG/ML
75 INJECTION, SOLUTION INTRAVENOUS CONTINUOUS
Status: DISCONTINUED | OUTPATIENT
Start: 2024-07-03 | End: 2024-07-04

## 2024-07-03 RX ORDER — DESMOPRESSIN ACETATE 0.1 MG/1
0.1 TABLET ORAL 2 TIMES DAILY
Status: DISCONTINUED | OUTPATIENT
Start: 2024-07-03 | End: 2024-07-05

## 2024-07-03 SDOH — SOCIAL STABILITY: SOCIAL INSECURITY: ABUSE: ADULT

## 2024-07-03 SDOH — SOCIAL STABILITY: SOCIAL INSECURITY: HAVE YOU HAD ANY THOUGHTS OF HARMING ANYONE ELSE?: NO

## 2024-07-03 SDOH — SOCIAL STABILITY: SOCIAL INSECURITY: HAS ANYONE EVER THREATENED TO HURT YOUR FAMILY OR YOUR PETS?: NO

## 2024-07-03 SDOH — SOCIAL STABILITY: SOCIAL INSECURITY: ARE YOU OR HAVE YOU BEEN THREATENED OR ABUSED PHYSICALLY, EMOTIONALLY, OR SEXUALLY BY ANYONE?: NO

## 2024-07-03 SDOH — SOCIAL STABILITY: SOCIAL INSECURITY: WERE YOU ABLE TO COMPLETE ALL THE BEHAVIORAL HEALTH SCREENINGS?: YES

## 2024-07-03 SDOH — SOCIAL STABILITY: SOCIAL INSECURITY: ARE THERE ANY APPARENT SIGNS OF INJURIES/BEHAVIORS THAT COULD BE RELATED TO ABUSE/NEGLECT?: NO

## 2024-07-03 SDOH — SOCIAL STABILITY: SOCIAL INSECURITY: HAVE YOU HAD THOUGHTS OF HARMING ANYONE ELSE?: NO

## 2024-07-03 SDOH — SOCIAL STABILITY: SOCIAL INSECURITY: DOES ANYONE TRY TO KEEP YOU FROM HAVING/CONTACTING OTHER FRIENDS OR DOING THINGS OUTSIDE YOUR HOME?: NO

## 2024-07-03 SDOH — SOCIAL STABILITY: SOCIAL INSECURITY: DO YOU FEEL ANYONE HAS EXPLOITED OR TAKEN ADVANTAGE OF YOU FINANCIALLY OR OF YOUR PERSONAL PROPERTY?: NO

## 2024-07-03 SDOH — SOCIAL STABILITY: SOCIAL INSECURITY: DO YOU FEEL UNSAFE GOING BACK TO THE PLACE WHERE YOU ARE LIVING?: NO

## 2024-07-03 ASSESSMENT — ENCOUNTER SYMPTOMS
DIZZINESS: 0
CONFUSION: 0
ADENOPATHY: 0
COLOR CHANGE: 0
DIARRHEA: 0
NAUSEA: 0
NUMBNESS: 1
DIFFICULTY URINATING: 0
WEAKNESS: 0
FLANK PAIN: 0
ABDOMINAL PAIN: 0
AGITATION: 0
ABDOMINAL DISTENTION: 0
SINUS PAIN: 0
NECK STIFFNESS: 0
LIGHT-HEADEDNESS: 0
FEVER: 0
SHORTNESS OF BREATH: 0
PALPITATIONS: 0
CHILLS: 0
NECK PAIN: 0
CHEST TIGHTNESS: 0
WHEEZING: 0
CONSTIPATION: 0

## 2024-07-03 ASSESSMENT — PATIENT HEALTH QUESTIONNAIRE - PHQ9
2. FEELING DOWN, DEPRESSED OR HOPELESS: NOT AT ALL
SUM OF ALL RESPONSES TO PHQ9 QUESTIONS 1 & 2: 0
1. LITTLE INTEREST OR PLEASURE IN DOING THINGS: NOT AT ALL

## 2024-07-03 ASSESSMENT — ACTIVITIES OF DAILY LIVING (ADL)
TOILETING: NEEDS ASSISTANCE
HEARING - LEFT EAR: HEARING AID
BATHING: NEEDS ASSISTANCE
GROOMING: NEEDS ASSISTANCE
FEEDING YOURSELF: NEEDS ASSISTANCE
ADEQUATE_TO_COMPLETE_ADL: YES
WALKS IN HOME: NEEDS ASSISTANCE
PATIENT'S MEMORY ADEQUATE TO SAFELY COMPLETE DAILY ACTIVITIES?: YES
ASSISTIVE_DEVICE: HEARING AID - LEFT;HEARING AID - RIGHT
JUDGMENT_ADEQUATE_SAFELY_COMPLETE_DAILY_ACTIVITIES: YES
HEARING - RIGHT EAR: HEARING AID
DRESSING YOURSELF: NEEDS ASSISTANCE

## 2024-07-03 ASSESSMENT — LIFESTYLE VARIABLES
EVER HAD A DRINK FIRST THING IN THE MORNING TO STEADY YOUR NERVES TO GET RID OF A HANGOVER: NO
AUDIT-C TOTAL SCORE: 0
AUDIT-C TOTAL SCORE: 0
HAVE PEOPLE ANNOYED YOU BY CRITICIZING YOUR DRINKING: NO
HAVE YOU EVER FELT YOU SHOULD CUT DOWN ON YOUR DRINKING: NO
SKIP TO QUESTIONS 9-10: 1
HOW MANY STANDARD DRINKS CONTAINING ALCOHOL DO YOU HAVE ON A TYPICAL DAY: PATIENT DOES NOT DRINK
HOW OFTEN DO YOU HAVE 6 OR MORE DRINKS ON ONE OCCASION: NEVER
HOW OFTEN DO YOU HAVE A DRINK CONTAINING ALCOHOL: NEVER
EVER FELT BAD OR GUILTY ABOUT YOUR DRINKING: NO
TOTAL SCORE: 0

## 2024-07-03 ASSESSMENT — COGNITIVE AND FUNCTIONAL STATUS - GENERAL
DRESSING REGULAR LOWER BODY CLOTHING: A LITTLE
PERSONAL GROOMING: A LOT
TOILETING: A LOT
TURNING FROM BACK TO SIDE WHILE IN FLAT BAD: A LITTLE
TURNING FROM BACK TO SIDE WHILE IN FLAT BAD: A LOT
EATING MEALS: A LITTLE
MOBILITY SCORE: 11
EATING MEALS: A LITTLE
PATIENT BASELINE BEDBOUND: NO
HELP NEEDED FOR BATHING: A LITTLE
DRESSING REGULAR LOWER BODY CLOTHING: A LOT
WALKING IN HOSPITAL ROOM: A LOT
DRESSING REGULAR UPPER BODY CLOTHING: A LITTLE
MOVING TO AND FROM BED TO CHAIR: A LOT
HELP NEEDED FOR BATHING: A LOT
TOILETING: A LOT
STANDING UP FROM CHAIR USING ARMS: A LITTLE
PERSONAL GROOMING: A LITTLE
MOVING TO AND FROM BED TO CHAIR: A LOT
MOVING TO AND FROM BED TO CHAIR: A LITTLE
DAILY ACTIVITIY SCORE: 18
DAILY ACTIVITIY SCORE: 15
MOBILITY SCORE: 16
STANDING UP FROM CHAIR USING ARMS: A LOT
TURNING FROM BACK TO SIDE WHILE IN FLAT BAD: A LITTLE
EATING MEALS: A LITTLE
DRESSING REGULAR LOWER BODY CLOTHING: A LOT
TOILETING: A LITTLE
CLIMB 3 TO 5 STEPS WITH RAILING: A LOT
MOVING FROM LYING ON BACK TO SITTING ON SIDE OF FLAT BED WITH BEDRAILS: A LITTLE
MOVING FROM LYING ON BACK TO SITTING ON SIDE OF FLAT BED WITH BEDRAILS: A LOT
CLIMB 3 TO 5 STEPS WITH RAILING: TOTAL
CLIMB 3 TO 5 STEPS WITH RAILING: TOTAL
DRESSING REGULAR UPPER BODY CLOTHING: A LITTLE
WALKING IN HOSPITAL ROOM: A LOT
PERSONAL GROOMING: A LITTLE
DRESSING REGULAR UPPER BODY CLOTHING: A LOT
WALKING IN HOSPITAL ROOM: A LOT
MOVING FROM LYING ON BACK TO SITTING ON SIDE OF FLAT BED WITH BEDRAILS: A LITTLE
MOBILITY SCORE: 13
DAILY ACTIVITIY SCORE: 13
STANDING UP FROM CHAIR USING ARMS: A LOT
HELP NEEDED FOR BATHING: A LOT

## 2024-07-03 ASSESSMENT — PAIN - FUNCTIONAL ASSESSMENT
PAIN_FUNCTIONAL_ASSESSMENT: 0-10

## 2024-07-03 ASSESSMENT — COLUMBIA-SUICIDE SEVERITY RATING SCALE - C-SSRS
1. IN THE PAST MONTH, HAVE YOU WISHED YOU WERE DEAD OR WISHED YOU COULD GO TO SLEEP AND NOT WAKE UP?: NO
2. HAVE YOU ACTUALLY HAD ANY THOUGHTS OF KILLING YOURSELF?: NO
6. HAVE YOU EVER DONE ANYTHING, STARTED TO DO ANYTHING, OR PREPARED TO DO ANYTHING TO END YOUR LIFE?: NO

## 2024-07-03 ASSESSMENT — PAIN SCALES - GENERAL
PAINLEVEL_OUTOF10: 3
PAINLEVEL_OUTOF10: 0 - NO PAIN
PAINLEVEL_OUTOF10: 8
PAINLEVEL_OUTOF10: 0 - NO PAIN

## 2024-07-03 ASSESSMENT — PAIN DESCRIPTION - ORIENTATION: ORIENTATION: LEFT

## 2024-07-03 ASSESSMENT — PAIN DESCRIPTION - LOCATION: LOCATION: SHOULDER

## 2024-07-03 NOTE — H&P
Internal Medicine - History and Physical Note      Patient: Sol Gomez, Age: 84 y.o., SEX: female , MRN:[id number], ROOM:219/Replaced by [facility],  Code: Full Code   Admitted On: 7/3/2024   Admitting Dx: Hyponatremia [E87.1]  Frequent falls [R29.6]  Acute metabolic encephalopathy [G93.41]  PCP: HAMMAD Nolan-CNP        Attending: David Mcmahan DO        Chief Complaint   Patient: Sol Gomez is a 84 y.o. female   Chief Complaint   Patient presents with   • Weakness, Gen   • Fall     Pt has had multiple falls X 24 hours. Pt denies any injuries and reports recurrent left sided neck/shoulder pain. Pt denies blood thinners, LOC. Pt reports normally ambulating with a walker. Pt had a TIA X 3 weeks ago.       HPI   Sol Gomez is a 84 y.o. year old female  patient with pmh of multiple CVAs, Diabetes Insipidus, carpal tunnel syndrome, and HTN who presented to the hospital for multiple falls over the past 3 days and a burning sensation in her left upper extremity. Patient's daughter, who is her PoA, is present at bedside and stated that the patient fell 3 times over the past 24 hours. She also stated that the patient had lost consciousness on Sunday without precipitating events. Patient denied palpitations, chest pain, or shortness of breath. 3 weeks prior to current admission, patient presented to [facility] for slurred speech, falls, and a similar burning sensation in her hands as the current visit. Patient was evaluated for stroke symptoms and received a full workup according to ED notes. Patient had findings indicative of UTI at the time and was sent home with antibiotics on 6/24. Patient has been experiencing involuntary muscle spasms of her left arm for the past few weeks, increased slurring of speech, numbness and burning sensation in left upper extremity.     ROS  Review of Systems   Constitutional:  Negative for chills and fever.   HENT:  Positive for hearing loss. Negative for congestion  and sinus pain.    Respiratory:  Negative for chest tightness, shortness of breath and wheezing.    Cardiovascular:  Negative for chest pain and palpitations.   Gastrointestinal:  Negative for abdominal distention, abdominal pain, constipation, diarrhea and nausea.   Genitourinary:  Negative for decreased urine volume, difficulty urinating, flank pain and urgency.   Musculoskeletal:  Negative for neck pain and neck stiffness.   Skin:  Negative for color change and pallor.   Neurological:  Positive for syncope and numbness. Negative for dizziness, weakness and light-headedness.   Hematological:  Negative for adenopathy.   Psychiatric/Behavioral:  Negative for agitation, confusion and self-injury.         12 Point ROS negative unless otherwise specified above.     ED COURSE:   VS: Temperature 97.3 F , /82 , heart rate 85 , respiration 16 , O2 sat 95% RA    Labs  - CBC: Elevated absolute neutrophils 8.41 with WBC 10.3, otherwise unremarkable  - BMP: Na 125, Glucose 115  - LFTs: ALP 63, AST 18, ALT 14  - Lactate: 0.8  - UA positive for: 0.03 Trace blood, 75 LE, Negative Nitrite, Negative Ketones  - Urine cultures: Pending    Imaging: Head CT w/o IV contrast indicated no acute intracranial pathology  Humerus XR indicated no acute osseous abnormality       Past Medical History: Five past CVAs, Diabetes Insipidus, HTN  Past Surgical History: Per EMR  Allergies: Aspirin (Nose bleed)  Family History: None indicated  Home Meds:Desmopressin 0.1 mg BID    HealthCare Providers:  - PCP: HAMMAD Nolan-CNP     Code Status: Full Code     Emergency contact: Extended Emergency Contact Information  Primary Emergency Contact: Mellissa Vee  Home Phone: [phone]  Relation: Child     Past Medical History     Past Medical History:   Diagnosis Date   • Atrial fibrillation (Multi) 06/28/2024   • CVA (cerebral vascular accident) (Multi) 12/03/2010   • Essential hypertension 06/28/2024   • Syncope and collapse  06/28/2024        Surgical History     Past Surgical History:   Procedure Laterality Date   • MR HEAD ANGIO WO IV CONTRAST  7/15/2012    MR HEAD ANGIO WO IV CONTRAST [facility]   • MR HEAD ANGIO WO IV CONTRAST  1/11/2013    MR HEAD ANGIO WO IV CONTRAST [facility]       Family History   No family history on file.    Social History     Social History     Socioeconomic History   • Marital status:      Spouse name: Not on file   • Number of children: Not on file   • Years of education: Not on file   • Highest education level: Not on file   Occupational History   • Not on file   Tobacco Use   • Smoking status: Not on file   • Smokeless tobacco: Not on file   Substance and Sexual Activity   • Alcohol use: Not on file   • Drug use: Not on file   • Sexual activity: Not on file   Other Topics Concern   • Not on file   Social History Narrative   • Not on file     Social Determinants of Health     Financial Resource Strain: Low Risk  (6/24/2024)    Overall Financial Resource Strain (CARDIA)    • Difficulty of Paying Living Expenses: Not hard at all   Food Insecurity: No Food Insecurity (9/13/2022)    Received from [facility]    Hunger Vital Sign    • Worried About Running Out of Food in the Last Year: Never true    • Ran Out of Food in the Last Year: Never true   Transportation Needs: No Transportation Needs (6/24/2024)    PRAPARE - Transportation    • Lack of Transportation (Medical): No    • Lack of Transportation (Non-Medical): No   Physical Activity: Insufficiently Active (9/13/2022)    Received from [facility]    Exercise Vital Sign    • Days of Exercise per Week: 2 days    • Minutes of Exercise per Session: 30 min   Stress: No Stress Concern Present (9/13/2022)    Received from [facility]    [place] [employer] - Occupational Stress Questionnaire    • Feeling of Stress : Not at all   Social Connections: Moderately Isolated (9/13/2022)    Received from  [facility]    Social Connection and Isolation Panel [NHANES]    • Frequency of Communication with Friends and Family: More than three times a week    • Frequency of Social Gatherings with Friends and Family: Twice a week    • Attends [employer] Services: More than 4 times per year    • Active Member of Clubs or Organizations: No    • Attends Club or Organization Meetings: Never    • Marital Status:    Intimate Partner Violence: Not on file   Housing Stability: Low Risk  (6/24/2024)    Housing Stability Vital Sign    • Unable to Pay for Housing in the Last Year: No    • Number of Places Lived in the Last Year: 1    • Unstable Housing in the Last Year: No       Tobacco Use: Unknown (8/14/2022)    Received from [facility]    Patient History    • Smoking Tobacco Use: Never    • Smokeless Tobacco Use: Unknown    • Passive Exposure: Not on file        Social History     Substance and Sexual Activity   Alcohol Use Not on file        Allergies     Allergies   Allergen Reactions   • Aspirin Unknown   • Propoxyphene Other and Unknown     Vomiting   • Salicylates Unknown     epistaxis   • Allergenic Ext-Tree Pollen Other          Meds    Scheduled medications  [Held by provider] amLODIPine, 2.5 mg, oral, Daily  atorvastatin, 80 mg, oral, Nightly  [START ON 7/4/2024] cefTRIAXone, 1 g, intravenous, q24h  [Held by provider] enoxaparin, 40 mg, subcutaneous, q24h      Continuous medications  sodium chloride 0.9%, 75 mL/hr, Last Rate: 75 mL/hr (07/03/24 1142)      PRN medications  PRN medications: acetaminophen, hydrALAZINE **FOLLOWED BY** [START ON 7/5/2024] hydrALAZINE, labetaloL, oxyCODONE, oxyCODONE, oxygen, polyethylene glycol     Objective    Physical Exam  Constitutional:       General: She is not in acute distress.     Appearance: Normal appearance. She is normal weight. She is not ill-appearing.   HENT:      Head: Normocephalic and atraumatic.      Right Ear: External ear normal.      Left Ear: External  "ear normal.      Nose: Nose normal.      Mouth/Throat:      Mouth: Mucous membranes are moist.   Eyes:      Extraocular Movements: Extraocular movements intact.      Conjunctiva/sclera: Conjunctivae normal.   Cardiovascular:      Rate and Rhythm: Normal rate and regular rhythm.      Pulses: Normal pulses.      Heart sounds: Normal heart sounds.   Pulmonary:      Effort: Pulmonary effort is normal. No respiratory distress.      Breath sounds: Normal breath sounds.   Abdominal:      General: Abdomen is flat. There is no distension.      Palpations: Abdomen is soft.      Tenderness: There is no abdominal tenderness.   Musculoskeletal:      Cervical back: Normal range of motion. No rigidity or tenderness.      Right lower leg: No edema.      Left lower leg: No edema.   Neurological:      General: No focal deficit present.      Mental Status: She is alert and oriented to person, place, and time.      Cranial Nerves: No cranial nerve deficit.      Sensory: No sensory deficit.      Motor: No weakness.      Coordination: Coordination normal.          Visit Vitals  /82   Pulse 85   Temp 36.3 °C (97.3 °F) (Temporal)   Resp 16   Ht 1.651 m (5' 5\")   Wt 69.9 kg (154 lb 1.6 oz)   SpO2 95%   BMI 25.64 kg/m²   OB Status Postmenopausal   BSA 1.79 m²          Intake/Output Summary (Last 24 hours) at 7/3/2024 1505  Last data filed at 7/3/2024 1356  Gross per 24 hour   Intake 50 ml   Output --   Net 50 ml             Labs:   Results from last 72 hours   Lab Units 07/03/24  1039   SODIUM mmol/L 125*   POTASSIUM mmol/L 4.1   CHLORIDE mmol/L 90*   CO2 mmol/L 27   BUN mg/dL 14   CREATININE mg/dL 0.63   GLUCOSE mg/dL 115*   CALCIUM mg/dL 9.1   ANION GAP mmol/L 12   EGFR mL/min/1.73m*2 88      Results from last 72 hours   Lab Units 07/03/24  1040   WBC AUTO x10*3/uL 10.3   HEMOGLOBIN g/dL 12.5   HEMATOCRIT % 36.7   PLATELETS AUTO x10*3/uL 210   NEUTROS PCT AUTO % 81.3   LYMPHS PCT AUTO % 8.4   MONOS PCT AUTO % 9.5   EOS PCT AUTO % 0.1 " "     Lab Results   Component Value Date    CALCIUM 9.1 07/03/2024      No results found for: \"CRP\"        Micro/ID:   Susceptibility data from last 90 days.  Collected Specimen Info Organism Ampicillin Cefazolin Cefazolin (uncomplicated UTIs only) Ciprofloxacin Gentamicin Nitrofurantoin Piperacillin/Tazobactam Tobramycin Trimethoprim/Sulfamethoxazole   06/24/24 Urine from Clean Catch/Voided Escherichia coli  S  S  S  S  R  S  S  R  S     Lab Results   Component Value Date    URINECULTURE >100,000 Escherichia coli (A) 06/24/2024                  Images    ECG 12 lead  Sinus rhythm with occasional Premature ventricular complexes  Possible Left atrial enlargement  Anterior infarct (cited on or before 24-JUN-2024)  Abnormal ECG  When compared with ECG of 24-JUN-2024 15:25,  Premature ventricular complexes are now Present  QRS axis Shifted left  XR humerus left  Narrative: Interpreted By:  Katie Peralta,   STUDY:  XR HUMERUS LEFT; ;  7/3/2024 12:17 pm      INDICATION:  Signs/Symptoms:L arm injury.      COMPARISON:  None.      ACCESSION NUMBER(S):  [id number]      ORDERING CLINICIAN:  ERON BURGESS      FINDINGS:  No acute fracture or dislocation. Calcific tendinosis of the rotator  cuff.      Impression: No acute osseous abnormality.      Signed by: Katie Peralta 7/3/2024 12:32 PM  Dictation workstation:   [id number]  CT head wo IV contrast  Narrative: Interpreted By:  Samantha Merritt,   STUDY:  CT HEAD WO IV CONTRAST;  7/3/2024 11:21 am      INDICATION:  Signs/Symptoms:fall head injury.      COMPARISON:  06/24/2024      ACCESSION NUMBER(S):  [id number]      ORDERING CLINICIAN:  ERON BURGESS      TECHNIQUE:  Examination was performed in the axial plane using soft tissue and  bone algorithm.      FINDINGS:  INTRACRANIAL:  There is prominence of the ventricular system and cerebral sulci  consistent with cerebral atrophy. There are periventricular  hypodensities consistent with  marked small vessel disease. No " mass  or mass effect is identified. There is no hemorrhage or subdural  fluid collection. There is no acute infarct.  There are small remote bilateral thalamic infarcts, seen previously.  There is no fracture of the calvarium.      EXTRACRANIAL:  Visualized paranasal sinuses and mastoids are clear.      Impression: No acute intracranial pathology.      MACRO:  None      Signed by: Samantha Merritt 7/3/2024 11:45 AM  Dictation workstation:   [id number]       Encounter Date: 07/03/24   ECG 12 lead   Result Value    Ventricular Rate 78    Atrial Rate 78    TN Interval 140    QRS Duration 70    QT Interval 414    QTC Calculation(Bazett) 471    P Axis 52    R Axis -25    T Axis 31    QRS Count 13    Q Onset 226    P Onset 156    P Offset 211    T Offset 433    QTC Fredericia 451    Narrative    Sinus rhythm with occasional Premature ventricular complexes  Possible Left atrial enlargement  Anterior infarct (cited on or before 24-JUN-2024)  Abnormal ECG  When compared with ECG of 24-JUN-2024 15:25,  Premature ventricular complexes are now Present  QRS axis Shifted left      Encounter Date: 07/03/24   ECG 12 lead   Result Value    Ventricular Rate 78    Atrial Rate 78    TN Interval 140    QRS Duration 70    QT Interval 414    QTC Calculation(Bazett) 471    P Axis 52    R Axis -25    T Axis 31    QRS Count 13    Q Onset 226    P Onset 156    P Offset 211    T Offset 433    QTC Fredericia 451    Narrative    Sinus rhythm with occasional Premature ventricular complexes  Possible Left atrial enlargement  Anterior infarct (cited on or before 24-JUN-2024)  Abnormal ECG  When compared with ECG of 24-JUN-2024 15:25,  Premature ventricular complexes are now Present  QRS axis Shifted left            Assessment and Plan    Sol RamosMarianneyinka is a 84 y.o. female admitted on 7/3/2024 with a pmh of multiple CVAs, Diabetes Insipidus, carpal tunnel syndrome, and HTN who presented to the hospital for multiple falls over the past 3 days and  a burning sensation in her left upper extremity. Patient's daughter is present at bedside and stated that the patient fell 3 times over the past 24 hours    ACUTE MEDICAL ISSUES:  #Rule out CVA  #Repeated Falls  -Patient presenting with L.sided arm burning sensation and numbness  -Has a history of multiple CVAs  -Physical exam demonstrated no weakness bilaterally or loss of sensation  -Head CT wo contrast indicated no acute intracranial process  Plan:  -Neurology consult per daughter's request  -Patient refused Aspirin EC 81mg due to epistaxis   -Atorvastatin 80 mg  -MRI Head/Neck/Brain wo contrast pending  -OT/PT eval   -Held Amlodipine    #Hyponatremia  #Diabetes Insipidus  -Na level on admission 125.   Plan:  -Continue home desmopressin  -NS 75 ml/hr  -Monitor electrolytes     #Uncomplicated Urinary Tract Infection  -Patient urinalysis indicated trace leukocyte esterase and blood  -Was discharged from [facility] on 6/24 for UTI. Culture at the time indicated E.Coli, and patient received a course of Cephelexin   Plan:  -Urine Culture Pending  -Ceftriaxone  -Monitor Renal Function Panel    #Syncopal Episode  #Rule out Cardiac  -Patient experienced a syncopal episode without prodrome 3 days prior to admission.  -Echo indicated EF 50-55%, impaired relaxation pattern of left ventricular diastolic filling, Aortic Stenosis is not present  -EKG in ED indicated sinus rhythm with occasional premature ventricular complexes  Plan:  -Repeat daily EKG as needed      CHRONIC MEDICAL ISSUES:  #Carpal Tunnel Syndrome-Pain Medication PRN  #HTN-Held Amlodipine for CVA workup. Hydralazine and Labatelol PRN for SBP>220      Fluids: NS 75 ml/hr  Nutrition: Adult diet Regular  Antimicrobials: Rocephin  DVT ppx: Lovenox held for 24 hours pending CVA workup  GI ppx: None indicated  Lines: 20G PIV Left Antecubital and 20G PIV Right Antecubital  Emergency Contact: Extended Emergency Contact Information  Primary Emergency Contact:  Mellissa Vee  Home Phone: [phone]  Relation: Child   Code: Full Code     Disposition: ELOS<2 Days    Bismark Guerrero DO  Internal Medicine, PGY- 1  07/03/24 at 3:05 PM           PGY3 Attestation: Patient is an 84 year old female with PMH of DI who presented with weakness and left sided arm tremors, also has been having recent falls. Admitted for UTI, hyponatremia, and CVA rule out. Stroke work up ordered and pending. Will continue Rocephin and monitor Na.

## 2024-07-03 NOTE — CARE PLAN
Problem: General Stroke  Goal: Establish a mutual long term goal with patient by discharge  Outcome: Progressing  Goal: Maintain BP within ordered limits throughout shift  Outcome: Progressing  Goal: No symptoms of aspiration throughout shift  Outcome: Progressing     Problem: Fall/Injury  Goal: Not fall by end of shift  Outcome: Progressing     Problem: Pain - Adult  Goal: Verbalizes/displays adequate comfort level or baseline comfort level  Outcome: Progressing     Problem: Safety - Adult  Goal: Free from fall injury  Outcome: Progressing     Problem: Skin  Goal: Participates in plan/prevention/treatment measures  Outcome: Progressing   The patient's goals for the shift include  to find out why I'm falling    The clinical goals for the shift include  keep pt safe    Over the shift, the patient did not make progress toward the following goals. Barriers to progression include none. Recommendations to address these barriers include none.

## 2024-07-03 NOTE — ED PROVIDER NOTES
HPI   Chief Complaint   Patient presents with    Weakness, Gen    Fall     Pt has had multiple falls X 24 hours. Pt denies any injuries and reports recurrent left sided neck/shoulder pain. Pt denies blood thinners, LOC. Pt reports normally ambulating with a walker. Pt had a TIA X 3 weeks ago.       This is an 84-year-old female coming in for multiple complaints per family member.  Patient has had multiple falls over the last 3 days.  The daughter states she is fallen 3 times in the last 24 hours.  Patient was recently here for slurred speech and falls and was diagnosed as a TIA and UTI when she was given antibiotics and discharged home.  The daughter states that symptoms have been worsening since this time.  Patient is now having continued movements of the left upper extremity.  She is having confusion as well as slurred speech that has been occurring intermittently.  The daughter is concerned with the patient being at home where she lives by herself.      History provided by:  Patient and relative                      Christine Coma Scale Score: 15                     Patient History   Past Medical History:   Diagnosis Date    Atrial fibrillation (Multi) 06/28/2024    CVA (cerebral vascular accident) (Multi) 12/03/2010    Essential hypertension 06/28/2024    Syncope and collapse 06/28/2024     Past Surgical History:   Procedure Laterality Date    MR HEAD ANGIO WO IV CONTRAST  7/15/2012    MR HEAD ANGIO WO IV CONTRAST [facility]    MR HEAD ANGIO WO IV CONTRAST  1/11/2013    MR HEAD ANGIO WO IV CONTRAST [facility]     No family history on file.  Social History     Tobacco Use    Smoking status: Not on file    Smokeless tobacco: Not on file   Substance Use Topics    Alcohol use: Not on file    Drug use: Not on file       Physical Exam   ED Triage Vitals [07/03/24 1043]   Temperature Heart Rate Respirations BP   36.6 °C (97.9 °F) 85 16 141/70      Pulse Ox Temp src Heart Rate Source Patient Position    99 % -- -- --      BP Location FiO2 (%)     -- --       Physical Exam  Vitals and nursing note reviewed.   Constitutional:       General: She is not in acute distress.     Appearance: Normal appearance. She is not toxic-appearing.   HENT:      Head: Normocephalic and atraumatic.      Nose: Nose normal.      Mouth/Throat:      Mouth: Mucous membranes are moist.      Pharynx: Oropharynx is clear.   Eyes:      Extraocular Movements: Extraocular movements intact.      Conjunctiva/sclera: Conjunctivae normal.      Pupils: Pupils are equal, round, and reactive to light.   Cardiovascular:      Rate and Rhythm: Normal rate and regular rhythm.      Pulses: Normal pulses.      Heart sounds: Normal heart sounds.   Pulmonary:      Effort: Pulmonary effort is normal. No respiratory distress.      Breath sounds: Normal breath sounds.   Abdominal:      General: Abdomen is flat. Bowel sounds are normal.      Palpations: Abdomen is soft.      Tenderness: There is no abdominal tenderness.   Musculoskeletal:         General: Normal range of motion.      Cervical back: Normal range of motion and neck supple.      Comments: Patient reports pain to palpation to the proximal left humerus.   Skin:     General: Skin is warm and dry.      Coloration: Skin is not jaundiced or pale.      Findings: No bruising.   Neurological:      General: No focal deficit present.      Mental Status: She is alert and oriented to person, place, and time. Mental status is at baseline.      GCS: GCS eye subscore is 4. GCS verbal subscore is 5. GCS motor subscore is 6.      Cranial Nerves: Cranial nerves 2-12 are intact.      Comments: No slurred speech noted on exam.   Psychiatric:         Mood and Affect: Mood normal.         Behavior: Behavior normal. Behavior is cooperative.         ED Course & MDM   Diagnoses as of 07/03/24 1320   Hyponatremia   Frequent falls       Medical Decision Making  Summary:  Medical Decision Making:   Patient presented as  described in HPI. Patient case including ROS, PE, and treatment and plan discussed with ED attending if attached as cosigner. Results from labs and or imaging included below if completed. Sol Gomez  is a 84 y.o. coming in for Patient presents with:  Weakness, Gen  Fall: Pt has had multiple falls X 24 hours. Pt denies any injuries and reports recurrent left sided neck/shoulder pain. Pt denies blood thinners, LOC. Pt reports normally ambulating with a walker. Pt had a TIA X 3 weeks ago.  .  Due to patient's complaint lab work was completed as well as imaging.  She has an NIH of 0 upon initial assessment.  I do not notice any slurred speech.  The daughter states that this is occurring intermittently.  CT of the head was completed and is negative.  Lab work does show a new hyponatremia of 125.  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  Further lab work shows normal white blood cell count.  Normal troponin.  No other electrolyte abnormalities of concern.  Patient's urine does show UTI.  She will be started on Rocephin.  Urine culture will be completed.  Vital signs are stable.  Due to the concerns of patient living by herself as well as new hyponatremia patient will be hospitalized for further care management.        Shared decision making was completed for required hospital stay. I also explained the plan and treatment course. Patient/guardian is in agreement with plan, treatment course, and state that they will comply.    Labs Reviewed  CBC WITH AUTO DIFFERENTIAL - Abnormal     WBC                           10.3                   nRBC                          0.0                    RBC                           4.26                   Hemoglobin                    12.5                   Hematocrit                    36.7                   MCV                           86                     MCH                           29.3                   MCHC                          34.1                   RDW                            13.0                   Platelets                     210                    Neutrophils %                 81.3                   Immature Granulocytes %, Automated   0.5                    Lymphocytes %                 8.4                    Monocytes %                   9.5                    Eosinophils %                 0.1                    Basophils %                   0.2                    Neutrophils Absolute          8.41 (*)               Immature Granulocytes Absolute, Au*   0.05                   Lymphocytes Absolute          0.87                   Monocytes Absolute            0.98 (*)               Eosinophils Absolute          0.01                   Basophils Absolute            0.02                COMPREHENSIVE METABOLIC PANEL - Abnormal     Glucose                       115 (*)                Sodium                        125 (*)                Potassium                     4.1                    Chloride                      90 (*)                 Bicarbonate                   27                     Anion Gap                     12                     Urea Nitrogen                 14                     Creatinine                    0.63                   eGFR                          88                     Calcium                       9.1                    Albumin                       4.4                    Alkaline Phosphatase          63                     Total Protein                 6.9                    AST                           18                     Bilirubin, Total              0.7                    ALT                           14                  URINALYSIS WITH REFLEX CULTURE AND MICROSCOPIC - Abnormal     Color, Urine                    (*)                  Appearance, Urine             Turbid (*)               Specific Gravity, Urine       1.014                  pH, Urine                     6.5                    Protein, Urine                10 (TRACE)                 Glucose, Urine                Normal                 Blood, Urine                    (*)                  Ketones, Urine                NEGATIVE                Bilirubin, Urine              NEGATIVE                Urobilinogen, Urine           Normal                 Nitrite, Urine                NEGATIVE                Leukocyte Esterase, Urine     75 Elizabeth/µL (*)            MICROSCOPIC ONLY, URINE - Abnormal     WBC, Urine                    6-10 (*)               WBC Clumps, Urine             RARE                   RBC, Urine                    11-20 (*)               Squamous Epithelial Cells, Urine   10-25 (FEW)                Bacteria, Urine               1+ (*)                 Mucus, Urine                  2+                     Hyaline Casts, Urine          OCCASIONAL (*)            MAGNESIUM - Normal     Magnesium                     1.92                LACTATE - Normal     Lactate                       0.8                      Narrative: Venipuncture immediately after or during the administration of Metamizole may lead to falsely low results. Testing should be performed immediately                prior to Metamizole dosing.  PROTIME-INR - Normal     Protime                       12.4                   INR                           1.1                 SARS-COV-2 PCR - Normal     Coronavirus 2019, PCR                                  Narrative: This assay has received FDA Emergency Use Authorization (EUA) and is only authorized for the duration of time that circumstances exist to justify the authorization of the emergency use of in vitro diagnostic tests for the detection of SARS-CoV-2 virus and/or diagnosis of COVID-19 infection under section 564(b)(1) of the Act, 21 U.S.C. 360bbb-3(b)(1). This assay is an in vitro diagnostic nucleic acid amplification test for the qualitative detection of SARS-CoV-2 from nasopharyngeal specimens and has been validated for use at [facility]  [facility]. Negative results do not preclude COVID-19 infections and should not be used as the sole basis for diagnosis, treatment, or other management decisions.                  SERIAL TROPONIN-INITIAL - Normal     Troponin I, High Sensitivity   8                        Narrative: Less than 99th percentile of normal range cutoff-                Female and children under 18 years old <14 ng/L; Male <21 ng/L: Negative                Repeat testing should be performed if clinically indicated.                                 Female and children under 18 years old 14-50 ng/L; Male 21-50 ng/L:                Consistent with possible cardiac damage and possible increased clinical                 risk. Serial measurements may help to assess extent of myocardial damage.                                 >50 ng/L: Consistent with cardiac damage, increased clinical risk and                myocardial infarction. Serial measurements may help assess extent of                 myocardial damage.                                  NOTE: Children less than 1 year old may have higher baseline troponin                 levels and results should be interpreted in conjunction with the overall                 clinical context.                                 NOTE: Troponin I testing is performed using a different                 testing methodology at [facility] than at formerly [facility]. Direct result comparisons should only                 be made within the same method.  URINE CULTURE  TROPONIN SERIES- (INITIAL, 1 HR)       Narrative: The following orders were created for panel order Troponin I Series, High Sensitivity (0, 1 HR).                Procedure                               Abnormality         Status                                   ---------                               -----------         ------                                   Troponin I, High Sensiti...[[id number]]  Normal              Final  result                                             Please view results for these tests on the individual orders.  URINALYSIS WITH REFLEX CULTURE AND MICROSCOPIC       Narrative: The following orders were created for panel order Urinalysis with Reflex Culture and Microscopic.                Procedure                               Abnormality         Status                                   ---------                               -----------         ------                                   Urinalysis with Reflex C...[[id number]]  Abnormal            Final result                             Extra Urine Gray Tube[[id number]]                            In process                                               Please view results for these tests on the individual orders.  EXTRA URINE GRAY TUBE   XR humerus left   Final Result    No acute osseous abnormality.          Signed by: Katie Peralta 7/3/2024 12:32 PM    Dictation workstation:   [id number]     CT head wo IV contrast   Final Result    No acute intracranial pathology.          MACRO:    None          Signed by: Samantha Merritt 7/3/2024 11:45 AM    Dictation workstation:   [id number]           NIH Stroke Scale: 0                Tests/Medications/Escalations of Care considered but not given: As in MDM    Patient care discussed with: N/A  Social Determinants affecting care: N/A    Final diagnosis and disposition as documented     Diagnoses as of 07/03/24 1322  Hyponatremia  Frequent falls       Shared decision making was completed and determined that patient will be hospitalized. I discussed the differential; results and admit plan with the patient and/or family/friend/caregiver if present.  I emphasized the importance of hospitalization need for re-evaluation/continued monitoring/interventions. They agreed that if they feel their condition is worsening or if they have any other concern they should alert staff immediately for further assistance. I gave the patient  an opportunity to ask all questions they had and answered all of them accordingly. The patient and/or family/friend/caregiver expressed understanding verbally and that they would comply.    Disposition:  Hospitalize to medical floor under Dr. Mendez per their orders. Discussed findings and treatment done here in ED with admitting physician. It would be a risk to discharge the patient in their condition due to possibility of deterioration in their condition and the need for urgent interventions.    This note has been transcribed using voice recognition and may contain grammatical errors, misplaced words, incorrect words, incorrect phrases or other errors.         Procedure  Procedures     Levon Topete PA-C  07/03/24 0248

## 2024-07-04 PROBLEM — E87.1 HYPONATREMIA: Status: ACTIVE | Noted: 2024-07-04

## 2024-07-04 LAB
ALBUMIN SERPL BCP-MCNC: 3.8 G/DL (ref 3.4–5)
ALBUMIN SERPL BCP-MCNC: 3.9 G/DL (ref 3.4–5)
ALBUMIN SERPL BCP-MCNC: 4 G/DL (ref 3.4–5)
ANION GAP SERPL CALC-SCNC: 12 MMOL/L (ref 10–20)
ANION GAP SERPL CALC-SCNC: 13 MMOL/L (ref 10–20)
ANION GAP SERPL CALC-SCNC: 9 MMOL/L (ref 10–20)
BUN SERPL-MCNC: 10 MG/DL (ref 6–23)
BUN SERPL-MCNC: 14 MG/DL (ref 6–23)
BUN SERPL-MCNC: 9 MG/DL (ref 6–23)
CALCIUM SERPL-MCNC: 8.5 MG/DL (ref 8.6–10.3)
CALCIUM SERPL-MCNC: 8.9 MG/DL (ref 8.6–10.3)
CALCIUM SERPL-MCNC: 9 MG/DL (ref 8.6–10.3)
CHLORIDE SERPL-SCNC: 101 MMOL/L (ref 98–107)
CHLORIDE SERPL-SCNC: 92 MMOL/L (ref 98–107)
CHLORIDE SERPL-SCNC: 95 MMOL/L (ref 98–107)
CO2 SERPL-SCNC: 24 MMOL/L (ref 21–32)
CO2 SERPL-SCNC: 25 MMOL/L (ref 21–32)
CO2 SERPL-SCNC: 28 MMOL/L (ref 21–32)
CORTIS AM PEAK SERPL-MSCNC: 15.3 UG/DL (ref 5–20)
CREAT SERPL-MCNC: 0.55 MG/DL (ref 0.5–1.05)
CREAT SERPL-MCNC: 0.59 MG/DL (ref 0.5–1.05)
CREAT SERPL-MCNC: 0.76 MG/DL (ref 0.5–1.05)
EGFRCR SERPLBLD CKD-EPI 2021: 77 ML/MIN/1.73M*2
EGFRCR SERPLBLD CKD-EPI 2021: 89 ML/MIN/1.73M*2
EGFRCR SERPLBLD CKD-EPI 2021: >90 ML/MIN/1.73M*2
ERYTHROCYTE [DISTWIDTH] IN BLOOD BY AUTOMATED COUNT: 12.8 % (ref 11.5–14.5)
GLUCOSE BLD MANUAL STRIP-MCNC: 100 MG/DL (ref 74–99)
GLUCOSE BLD MANUAL STRIP-MCNC: 108 MG/DL (ref 74–99)
GLUCOSE BLD MANUAL STRIP-MCNC: 116 MG/DL (ref 74–99)
GLUCOSE BLD MANUAL STRIP-MCNC: 119 MG/DL (ref 74–99)
GLUCOSE BLD MANUAL STRIP-MCNC: 142 MG/DL (ref 74–99)
GLUCOSE SERPL-MCNC: 120 MG/DL (ref 74–99)
GLUCOSE SERPL-MCNC: 134 MG/DL (ref 74–99)
GLUCOSE SERPL-MCNC: 90 MG/DL (ref 74–99)
HCT VFR BLD AUTO: 33.5 % (ref 36–46)
HGB BLD-MCNC: 11.5 G/DL (ref 12–16)
MAGNESIUM SERPL-MCNC: 1.84 MG/DL (ref 1.6–2.4)
MCH RBC QN AUTO: 29 PG (ref 26–34)
MCHC RBC AUTO-ENTMCNC: 34.3 G/DL (ref 32–36)
MCV RBC AUTO: 85 FL (ref 80–100)
NRBC BLD-RTO: 0 /100 WBCS (ref 0–0)
OSMOLALITY SERPL: 255 MOSM/KG (ref 280–300)
PHOSPHATE SERPL-MCNC: 2.9 MG/DL (ref 2.5–4.9)
PHOSPHATE SERPL-MCNC: 3.1 MG/DL (ref 2.5–4.9)
PHOSPHATE SERPL-MCNC: 3.2 MG/DL (ref 2.5–4.9)
PLATELET # BLD AUTO: 186 X10*3/UL (ref 150–450)
POTASSIUM SERPL-SCNC: 3.9 MMOL/L (ref 3.5–5.3)
POTASSIUM SERPL-SCNC: 3.9 MMOL/L (ref 3.5–5.3)
POTASSIUM SERPL-SCNC: 4 MMOL/L (ref 3.5–5.3)
RBC # BLD AUTO: 3.96 X10*6/UL (ref 4–5.2)
SODIUM SERPL-SCNC: 125 MMOL/L (ref 136–145)
SODIUM SERPL-SCNC: 128 MMOL/L (ref 136–145)
SODIUM SERPL-SCNC: 134 MMOL/L (ref 136–145)
URATE SERPL-MCNC: 2.7 MG/DL (ref 2.3–6.7)
WBC # BLD AUTO: 10.3 X10*3/UL (ref 4.4–11.3)

## 2024-07-04 PROCEDURE — 83735 ASSAY OF MAGNESIUM: CPT

## 2024-07-04 PROCEDURE — 82947 ASSAY GLUCOSE BLOOD QUANT: CPT

## 2024-07-04 PROCEDURE — 2500000001 HC RX 250 WO HCPCS SELF ADMINISTERED DRUGS (ALT 637 FOR MEDICARE OP): Performed by: PSYCHIATRY & NEUROLOGY

## 2024-07-04 PROCEDURE — 2500000001 HC RX 250 WO HCPCS SELF ADMINISTERED DRUGS (ALT 637 FOR MEDICARE OP)

## 2024-07-04 PROCEDURE — 99223 1ST HOSP IP/OBS HIGH 75: CPT | Performed by: INTERNAL MEDICINE

## 2024-07-04 PROCEDURE — 85027 COMPLETE CBC AUTOMATED: CPT

## 2024-07-04 PROCEDURE — 2500000004 HC RX 250 GENERAL PHARMACY W/ HCPCS (ALT 636 FOR OP/ED)

## 2024-07-04 PROCEDURE — 82533 TOTAL CORTISOL: CPT | Mod: GEALAB

## 2024-07-04 PROCEDURE — 36415 COLL VENOUS BLD VENIPUNCTURE: CPT

## 2024-07-04 PROCEDURE — 96372 THER/PROPH/DIAG INJ SC/IM: CPT

## 2024-07-04 PROCEDURE — 86255 FLUORESCENT ANTIBODY SCREEN: CPT | Performed by: PSYCHIATRY & NEUROLOGY

## 2024-07-04 PROCEDURE — 99232 SBSQ HOSP IP/OBS MODERATE 35: CPT

## 2024-07-04 PROCEDURE — 80069 RENAL FUNCTION PANEL: CPT

## 2024-07-04 PROCEDURE — 1200000002 HC GENERAL ROOM WITH TELEMETRY DAILY

## 2024-07-04 PROCEDURE — 99222 1ST HOSP IP/OBS MODERATE 55: CPT | Performed by: PSYCHIATRY & NEUROLOGY

## 2024-07-04 RX ORDER — GABAPENTIN 300 MG/1
300 CAPSULE ORAL 3 TIMES DAILY
Status: DISCONTINUED | OUTPATIENT
Start: 2024-07-04 | End: 2024-07-08 | Stop reason: HOSPADM

## 2024-07-04 RX ORDER — LANOLIN ALCOHOL/MO/W.PET/CERES
400 CREAM (GRAM) TOPICAL ONCE
Status: DISCONTINUED | OUTPATIENT
Start: 2024-07-04 | End: 2024-07-04

## 2024-07-04 RX ORDER — LANOLIN ALCOHOL/MO/W.PET/CERES
400 CREAM (GRAM) TOPICAL ONCE
Status: COMPLETED | OUTPATIENT
Start: 2024-07-04 | End: 2024-07-04

## 2024-07-04 ASSESSMENT — ENCOUNTER SYMPTOMS
SPEECH DIFFICULTY: 0
NERVOUS/ANXIOUS: 0
VOMITING: 0
CHILLS: 0
FACIAL ASYMMETRY: 0
APPETITE CHANGE: 0
NECK PAIN: 0
DYSURIA: 0
DIAPHORESIS: 0
WOUND: 0
SINUS PRESSURE: 0
VOICE CHANGE: 0
NECK STIFFNESS: 0
EYE DISCHARGE: 0
DIARRHEA: 0
EYE REDNESS: 0
COUGH: 0
FATIGUE: 0
ANAL BLEEDING: 0
JOINT SWELLING: 0
LIGHT-HEADEDNESS: 0
HEADACHES: 0
DIZZINESS: 0
FEVER: 0
FREQUENCY: 0
NAUSEA: 0
CONSTIPATION: 0
SEIZURES: 0
WHEEZING: 0
EYE PAIN: 0
EYE ITCHING: 0
HYPERACTIVE: 0
HALLUCINATIONS: 0
PALPITATIONS: 0
CONFUSION: 0
FLANK PAIN: 0
ACTIVITY CHANGE: 0
AGITATION: 0
DIFFICULTY URINATING: 0
DECREASED CONCENTRATION: 0
BLOOD IN STOOL: 0
SINUS PAIN: 0
ADENOPATHY: 0
SLEEP DISTURBANCE: 0
ABDOMINAL DISTENTION: 0
DYSPHORIC MOOD: 0
COLOR CHANGE: 0
APNEA: 0
ARTHRALGIAS: 0
BRUISES/BLEEDS EASILY: 0
ABDOMINAL PAIN: 0
MYALGIAS: 0
CHOKING: 0
SORE THROAT: 0
NUMBNESS: 0
RECTAL PAIN: 0
BACK PAIN: 0
SHORTNESS OF BREATH: 0
TROUBLE SWALLOWING: 0
CHEST TIGHTNESS: 0
POLYDIPSIA: 0
STRIDOR: 0

## 2024-07-04 ASSESSMENT — COGNITIVE AND FUNCTIONAL STATUS - GENERAL
DRESSING REGULAR LOWER BODY CLOTHING: A LOT
TOILETING: A LOT
DRESSING REGULAR UPPER BODY CLOTHING: A LOT
MOBILITY SCORE: 11
HELP NEEDED FOR BATHING: A LOT
TURNING FROM BACK TO SIDE WHILE IN FLAT BAD: A LOT
CLIMB 3 TO 5 STEPS WITH RAILING: TOTAL
DRESSING REGULAR LOWER BODY CLOTHING: A LOT
MOVING FROM LYING ON BACK TO SITTING ON SIDE OF FLAT BED WITH BEDRAILS: A LOT
PERSONAL GROOMING: A LOT
MOVING TO AND FROM BED TO CHAIR: A LOT
DRESSING REGULAR UPPER BODY CLOTHING: A LOT
EATING MEALS: A LITTLE
WALKING IN HOSPITAL ROOM: A LOT
MOVING TO AND FROM BED TO CHAIR: A LOT
MOBILITY SCORE: 11
STANDING UP FROM CHAIR USING ARMS: A LOT
HELP NEEDED FOR BATHING: A LOT
EATING MEALS: A LITTLE
TURNING FROM BACK TO SIDE WHILE IN FLAT BAD: A LOT
DAILY ACTIVITIY SCORE: 13
MOVING FROM LYING ON BACK TO SITTING ON SIDE OF FLAT BED WITH BEDRAILS: A LOT
TOILETING: A LOT
PERSONAL GROOMING: A LOT
CLIMB 3 TO 5 STEPS WITH RAILING: TOTAL
WALKING IN HOSPITAL ROOM: A LOT
DAILY ACTIVITIY SCORE: 13
STANDING UP FROM CHAIR USING ARMS: A LOT

## 2024-07-04 ASSESSMENT — PAIN SCALES - GENERAL
PAINLEVEL_OUTOF10: 0 - NO PAIN
PAINLEVEL_OUTOF10: 0 - NO PAIN

## 2024-07-04 ASSESSMENT — ACTIVITIES OF DAILY LIVING (ADL): LACK_OF_TRANSPORTATION: NO

## 2024-07-04 NOTE — PROGRESS NOTES
07/04/24 1544   Discharge Planning   Living Arrangements Alone   Support Systems Children  (Daughter Mellissa)   Assistance Needed normally alert and oriented x 3, Independent with ADL's, Doesn't drive, Rollator, Cane, Transfer chair, Grab bars, Shower chair, Raised toilet with rails, Daughter and neighbor transport to appointments.   Type of Residence Private residence   Number of Stairs to Enter Residence 4   Number of Stairs Within Residence 0   Do you have animals or pets at home? No   Who is requesting discharge planning? Provider   Home or Post Acute Services In home services;Post acute facilities (Rehab/SNF/etc)   Type of Post Acute Facility Services Skilled nursing   Type of Home Care Services Home nursing visits;Home OT;Home PT   Patient expects to be discharged to: TBD, PT/OT evaluations pending at this time, patient not appropriate for evaluations at this time.   Does the patient need discharge transport arranged? Yes   RoundTrip coordination needed? Yes   Has discharge transport been arranged? No   Financial Resource Strain   How hard is it for you to pay for the very basics like food, housing, medical care, and heating? Not hard   Housing Stability   In the last 12 months, was there a time when you were not able to pay the mortgage or rent on time? N   In the last 12 months, how many places have you lived? 1   In the last 12 months, was there a time when you did not have a steady place to sleep or slept in a shelter (including now)? N   Transportation Needs   In the past 12 months, has lack of transportation kept you from medical appointments or from getting medications? no   In the past 12 months, has lack of transportation kept you from meetings, work, or from getting things needed for daily living? No   Patient Choice   Provider Choice list and CMS website (https://medicare.gov/care-compare#search) for post-acute Quality and Resource Measure Data were provided and reviewed with: Family;Patient    Patient / Family choosing to utilize agency / facility established prior to hospitalization No

## 2024-07-04 NOTE — CONSULTS
Reason For Consult  Hyponatremia    History Of Present Illness  Sol Gomez is a 84 y.o. year old female  patient with pmh of multiple CVAs, Diabetes Insipidus on desmopressin, carpal tunnel syndrome, and HTN who presented to the hospital for multiple falls over the past 3 days and a burning sensation in her left upper extremity.  She was found to be hyponatremic-nephrology team was consulted    Patient was seen and examined in her room today.  She is not awake or alert.  Not following commands.  No history.  Most of information per chart review    Per report, patient fell 3 times over the past 24 hours. She also stated that the patient had lost consciousness on Sunday without precipitating events. Patient denied palpitations, chest pain, or shortness of breath. 3 weeks prior to current admission, patient presented to [facility] for slurred speech, falls, and a similar burning sensation in her hands as the current visit. Patient was evaluated for stroke symptoms and received a full workup according to ED notes. Patient had findings indicative of UTI at the time and was sent home with antibiotics on 6/24. Patient has been experiencing involuntary muscle spasms of her left arm for the past few weeks, increased slurring of speech, numbness and burning sensation in left upper extremity.         ED COURSE:   VS: Temperature 97.3 F , /82 , heart rate 85 , respiration 16 , O2 sat 95% RA     Labs  - CBC: Elevated absolute neutrophils 8.41 with WBC 10.3, otherwise unremarkable  - BMP: Na 125, Glucose 115  - LFTs: ALP 63, AST 18, ALT 14  - Lactate: 0.8  - UA positive for: 0.03 Trace blood, 75 LE, Negative Nitrite, Negative Ketones  - Urine cultures: Pending     Imaging: Head CT w/o IV contrast indicated no acute intracranial pathology  Humerus XR indicated no acute osseous abnormality         Past Medical History: Five past CVAs, Diabetes Insipidus, HTN  Past Surgical History: Per EMR  Allergies: Aspirin (Nose  bleed)  Family History: None indicated  Home Meds:Desmopressin 0.1 mg BID     HealthCare Providers:  - PCP: MABEL Nolan      Code Status: Full Code      Emergency contact: Extended Emergency Contact Information  Primary Emergency Contact: Mellissa Vee Phone: [phone]  Relation: Child      Past Medical History  She has a past medical history of Atrial fibrillation (Multi) (06/28/2024), CVA (cerebral vascular accident) (Multi) (12/03/2010), Essential hypertension (06/28/2024), and Syncope and collapse (06/28/2024).    Surgical History  She has a past surgical history that includes MR angio head wo IV contrast (7/15/2012) and MR angio head wo IV contrast (1/11/2013).     Social History  She has no history on file for tobacco use, alcohol use, and drug use.    Family History  No family history on file.     Allergies  Aspirin, Propoxyphene, Salicylates, and Allergenic ext-tree pollen    Review of Systems  N/A     Physical Exam      General appearance: Lying comfortably in bed, euvolemic on exam, on room air, not awake  Eyes: non-icteric  HEENT: atrumatic head, PEERLA, moist mucosa  Skin: no apparent rash  Heart: NSR, S1, S2 normal, no murmur or gallop  Lungs: Symmetrical expansion,CTA bilat no wheezing/crackles  Abdomen: soft, nt/nd, obese  Extremities: no edema bilat  Neuro: No FND,asterixis, no focal deficits noticed           I&O 24HR    Intake/Output Summary (Last 24 hours) at 7/4/2024 1447  Last data filed at 7/4/2024 1059  Gross per 24 hour   Intake 1235.42 ml   Output 450 ml   Net 785.42 ml       Vitals 24HR  Heart Rate:  [74-86]   Temp:  [35.8 °C (96.4 °F)-37.2 °C (99 °F)]   Resp:  [11-22]   BP: (105-146)/(62-83)   SpO2:  [93 %-96 %]     Relevant Results  Results for orders placed or performed during the hospital encounter of 07/03/24 (from the past 96 hour(s))   Light Blue Top   Result Value Ref Range    Extra Tube Hold for add-ons.    Lavender Top   Result Value Ref Range     Extra Tube Hold for add-ons.    PST Top   Result Value Ref Range    Extra Tube Hold for add-ons.    Comprehensive metabolic panel   Result Value Ref Range    Glucose 115 (H) 74 - 99 mg/dL    Sodium 125 (L) 136 - 145 mmol/L    Potassium 4.1 3.5 - 5.3 mmol/L    Chloride 90 (L) 98 - 107 mmol/L    Bicarbonate 27 21 - 32 mmol/L    Anion Gap 12 10 - 20 mmol/L    Urea Nitrogen 14 6 - 23 mg/dL    Creatinine 0.63 0.50 - 1.05 mg/dL    eGFR 88 >60 mL/min/1.73m*2    Calcium 9.1 8.6 - 10.3 mg/dL    Albumin 4.4 3.4 - 5.0 g/dL    Alkaline Phosphatase 63 33 - 136 U/L    Total Protein 6.9 6.4 - 8.2 g/dL    AST 18 9 - 39 U/L    Bilirubin, Total 0.7 0.0 - 1.2 mg/dL    ALT 14 7 - 45 U/L   Magnesium   Result Value Ref Range    Magnesium 1.92 1.60 - 2.40 mg/dL   Protime-INR   Result Value Ref Range    Protime 12.4 9.8 - 12.8 seconds    INR 1.1 0.9 - 1.1   Troponin I, High Sensitivity, Initial   Result Value Ref Range    Troponin I, High Sensitivity 8 0 - 13 ng/L   Lavender Top   Result Value Ref Range    Extra Tube Hold for add-ons.    CBC and Auto Differential   Result Value Ref Range    WBC 10.3 4.4 - 11.3 x10*3/uL    nRBC 0.0 0.0 - 0.0 /100 WBCs    RBC 4.26 4.00 - 5.20 x10*6/uL    Hemoglobin 12.5 12.0 - 16.0 g/dL    Hematocrit 36.7 36.0 - 46.0 %    MCV 86 80 - 100 fL    MCH 29.3 26.0 - 34.0 pg    MCHC 34.1 32.0 - 36.0 g/dL    RDW 13.0 11.5 - 14.5 %    Platelets 210 150 - 450 x10*3/uL    Neutrophils % 81.3 40.0 - 80.0 %    Immature Granulocytes %, Automated 0.5 0.0 - 0.9 %    Lymphocytes % 8.4 13.0 - 44.0 %    Monocytes % 9.5 2.0 - 10.0 %    Eosinophils % 0.1 0.0 - 6.0 %    Basophils % 0.2 0.0 - 2.0 %    Neutrophils Absolute 8.41 (H) 1.60 - 5.50 x10*3/uL    Immature Granulocytes Absolute, Automated 0.05 0.00 - 0.50 x10*3/uL    Lymphocytes Absolute 0.87 0.80 - 3.00 x10*3/uL    Monocytes Absolute 0.98 (H) 0.05 - 0.80 x10*3/uL    Eosinophils Absolute 0.01 0.00 - 0.40 x10*3/uL    Basophils Absolute 0.02 0.00 - 0.10 x10*3/uL   ECG 12 lead    Result Value Ref Range    Ventricular Rate 78 BPM    Atrial Rate 78 BPM    CO Interval 140 ms    QRS Duration 70 ms    QT Interval 414 ms    QTC Calculation(Bazett) 471 ms    P Axis 52 degrees    R Axis -25 degrees    T Axis 31 degrees    QRS Count 13 beats    Q Onset 226 ms    P Onset 156 ms    P Offset 211 ms    T Offset 433 ms    QTC Fredericia 451 ms   Lactate   Result Value Ref Range    Lactate 0.8 0.4 - 2.0 mmol/L   Sars-CoV-2 PCR   Result Value Ref Range    Coronavirus 2019, PCR Not Detected Not Detected   Urinalysis with Reflex Culture and Microscopic   Result Value Ref Range    Color, Urine Light-Orange (N) Light-Yellow, Yellow, Dark-Yellow    Appearance, Urine Turbid (N) Clear    Specific Gravity, Urine 1.014 1.005 - 1.035    pH, Urine 6.5 5.0, 5.5, 6.0, 6.5, 7.0, 7.5, 8.0    Protein, Urine 10 (TRACE) NEGATIVE, 10 (TRACE), 20 (TRACE) mg/dL    Glucose, Urine Normal Normal mg/dL    Blood, Urine 0.03 (TRACE) (A) NEGATIVE    Ketones, Urine NEGATIVE NEGATIVE mg/dL    Bilirubin, Urine NEGATIVE NEGATIVE    Urobilinogen, Urine Normal Normal mg/dL    Nitrite, Urine NEGATIVE NEGATIVE    Leukocyte Esterase, Urine 75 Elizabeth/µL (A) NEGATIVE   Extra Urine Gray Tube   Result Value Ref Range    Extra Tube Hold for add-ons.    Microscopic Only, Urine   Result Value Ref Range    WBC, Urine 6-10 (A) 1-5, NONE /HPF    WBC Clumps, Urine RARE Reference range not established. /HPF    RBC, Urine 11-20 (A) NONE, 1-2, 3-5 /HPF    Squamous Epithelial Cells, Urine 10-25 (FEW) Reference range not established. /HPF    Bacteria, Urine 1+ (A) NONE SEEN /HPF    Mucus, Urine 2+ Reference range not established. /LPF    Hyaline Casts, Urine OCCASIONAL (A) NONE /LPF   Osmolality, urine   Result Value Ref Range    Osmolality, Urine Random 426 200 - 1,200 mOsm/kg   Urine electrolytes   Result Value Ref Range    Sodium, Urine Random 77 mmol/L    Sodium/Creatinine Ratio 77 Not established. mmol/g Creat    Potassium, Urine Random 52 mmol/L     Potassium/Creatinine Ratio 52 Not established mmol/g Creat    Chloride, Urine Random 72 mmol/L    Chloride/Creatinine Ratio 72 38 - 318 mmol/g creat    Creatinine, Urine Random 99.5 20.0 - 320.0 mg/dL   Transthoracic Echo (TTE) Complete   Result Value Ref Range    AV pk avinash 1.57 m/s    AV mn grad 5.4 mmHg    LVOT diam 1.90 cm    MV E/A ratio 0.68     LA vol index A/L 17.9 ml/m2    LV EF 53 %    RV free wall pk S' 13.00 cm/s    LVIDd 5.00 cm    RVSP 6.9 mmHg    Aortic Valve Area by Continuity of VTI 1.44 cm2    Aortic Valve Area by Continuity of Peak Velocity 1.69 cm2    AV pk grad 9.8 mmHg    LV A4C EF 47.1    POCT GLUCOSE   Result Value Ref Range    POCT Glucose 105 (H) 74 - 99 mg/dL   Renal function panel   Result Value Ref Range    Glucose 116 (H) 74 - 99 mg/dL    Sodium 123 (L) 136 - 145 mmol/L    Potassium 3.9 3.5 - 5.3 mmol/L    Chloride 90 (L) 98 - 107 mmol/L    Bicarbonate 24 21 - 32 mmol/L    Anion Gap 13 10 - 20 mmol/L    Urea Nitrogen 12 6 - 23 mg/dL    Creatinine 0.54 0.50 - 1.05 mg/dL    eGFR >90 >60 mL/min/1.73m*2    Calcium 8.7 8.6 - 10.3 mg/dL    Phosphorus 2.9 2.5 - 4.9 mg/dL    Albumin 4.0 3.4 - 5.0 g/dL   Osmolality   Result Value Ref Range    Osmolality, Serum 255 (L) 280 - 300 mOsm/kg   Renal function panel   Result Value Ref Range    Glucose 110 (H) 74 - 99 mg/dL    Sodium 122 (L) 136 - 145 mmol/L    Potassium 4.2 3.5 - 5.3 mmol/L    Chloride 89 (L) 98 - 107 mmol/L    Bicarbonate 23 21 - 32 mmol/L    Anion Gap 14 10 - 20 mmol/L    Urea Nitrogen 10 6 - 23 mg/dL    Creatinine 0.52 0.50 - 1.05 mg/dL    eGFR >90 >60 mL/min/1.73m*2    Calcium 8.6 8.6 - 10.3 mg/dL    Phosphorus 3.2 2.5 - 4.9 mg/dL    Albumin 3.8 3.4 - 5.0 g/dL   Uric Acid   Result Value Ref Range    Uric Acid 2.7 2.3 - 6.7 mg/dL   POCT GLUCOSE   Result Value Ref Range    POCT Glucose 113 (H) 74 - 99 mg/dL   CBC   Result Value Ref Range    WBC 10.3 4.4 - 11.3 x10*3/uL    nRBC 0.0 0.0 - 0.0 /100 WBCs    RBC 3.96 (L) 4.00 - 5.20  x10*6/uL    Hemoglobin 11.5 (L) 12.0 - 16.0 g/dL    Hematocrit 33.5 (L) 36.0 - 46.0 %    MCV 85 80 - 100 fL    MCH 29.0 26.0 - 34.0 pg    MCHC 34.3 32.0 - 36.0 g/dL    RDW 12.8 11.5 - 14.5 %    Platelets 186 150 - 450 x10*3/uL   Renal function panel   Result Value Ref Range    Glucose 90 74 - 99 mg/dL    Sodium 125 (L) 136 - 145 mmol/L    Potassium 3.9 3.5 - 5.3 mmol/L    Chloride 92 (L) 98 - 107 mmol/L    Bicarbonate 24 21 - 32 mmol/L    Anion Gap 13 10 - 20 mmol/L    Urea Nitrogen 10 6 - 23 mg/dL    Creatinine 0.55 0.50 - 1.05 mg/dL    eGFR >90 >60 mL/min/1.73m*2    Calcium 8.5 (L) 8.6 - 10.3 mg/dL    Phosphorus 2.9 2.5 - 4.9 mg/dL    Albumin 3.8 3.4 - 5.0 g/dL   Magnesium   Result Value Ref Range    Magnesium 1.84 1.60 - 2.40 mg/dL   POCT GLUCOSE   Result Value Ref Range    POCT Glucose 100 (H) 74 - 99 mg/dL   POCT GLUCOSE   Result Value Ref Range    POCT Glucose 119 (H) 74 - 99 mg/dL   Renal function panel   Result Value Ref Range    Glucose 134 (H) 74 - 99 mg/dL    Sodium 128 (L) 136 - 145 mmol/L    Potassium 3.9 3.5 - 5.3 mmol/L    Chloride 95 (L) 98 - 107 mmol/L    Bicarbonate 25 21 - 32 mmol/L    Anion Gap 12 10 - 20 mmol/L    Urea Nitrogen 9 6 - 23 mg/dL    Creatinine 0.59 0.50 - 1.05 mg/dL    eGFR 89 >60 mL/min/1.73m*2    Calcium 8.9 8.6 - 10.3 mg/dL    Phosphorus 3.2 2.5 - 4.9 mg/dL    Albumin 4.0 3.4 - 5.0 g/dL       Recent Labs     07/04/24  1149 07/04/24  0551 07/03/24  2300 07/03/24  1628 07/03/24  1039 06/27/24  0943 06/24/24  1531   * 125* 122* 123* 125* 140 140   K 3.9 3.9 4.2 3.9 4.1 4.1 4.1   CO2 25 24 23 24 27 28 29   BUN 9 10 10 12 14 20 24*   GLUCOSE 134* 90 110* 116* 115* 96 93   CALCIUM 8.9 8.5* 8.6 8.7 9.1 9.3 9.2   PHOS 3.2 2.9 3.2 2.9  --   --   --    CREATININE 0.59 0.55 0.52 0.54 0.63 0.66 0.74   EGFR 89 >90 >90 >90 88 87 80   ANIONGAP 12 13 14 13 12 11 10       Scheduled medications  atorvastatin, 80 mg, oral, Nightly  cefTRIAXone, 1 g, intravenous, q24h  [Held by provider]  "desmopressin, 0.1 mg, oral, BID  enoxaparin, 40 mg, subcutaneous, q24h  gabapentin, 300 mg, oral, TID      Continuous medications  sodium chloride 0.9%, 75 mL/hr, Last Rate: 75 mL/hr (07/04/24 1429)      PRN medications  PRN medications: acetaminophen, hydrALAZINE **FOLLOWED BY** [START ON 7/5/2024] hydrALAZINE, labetaloL, oxygen    Scheduled medications  atorvastatin, 80 mg, oral, Nightly  cefTRIAXone, 1 g, intravenous, q24h  [Held by provider] desmopressin, 0.1 mg, oral, BID  enoxaparin, 40 mg, subcutaneous, q24h  gabapentin, 300 mg, oral, TID      Continuous medications  sodium chloride 0.9%, 75 mL/hr, Last Rate: 75 mL/hr (07/04/24 1429)      PRN medications  PRN medications: acetaminophen, hydrALAZINE **FOLLOWED BY** [START ON 7/5/2024] hydrALAZINE, labetaloL, oxygen       Assessment/Plan     Sol Gomez is a 84 y.o. year old female  patient with pmh of multiple CVAs, Diabetes Insipidus on desmopressin, carpal tunnel syndrome, and HTN who presented to the hospital for multiple falls over the past 3 days and a burning sensation in her left upper extremity.  She was found to be hyponatremic-nephrology team was consulted    Impression  # Severe hyponatremia?  Symptomatic most likely iatrogenic due to desmopressin use for DI.  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  Currently desmopressin is on hold.  Patient is receiving normal saline and serum sodium is improving and accepted rate currently around 128.  She had normal baseline serum sodium    # Altered mental status/recurrent fall  -Rule out CVA/arrhythmias    # Diabetes insipidus-chronic    # UTI-currently on ceftriaxone    # Hypertension-accepted.  Holding amlodipine home medicine    Recommendation plan  -Continue to hold desmopressin  -Continue sodium chloride 100 cc/h as we are doing  -Rest of workup per primary team    Principal Problem:    Acute metabolic encephalopathy  Active Problems:    Hyponatremia      I spent 60 " minutes in the professional and overall care of this patient.      Mary Carmen Glover MD

## 2024-07-04 NOTE — CARE PLAN
The patient's goals for the shift include  XIANG    The clinical goals for the shift include pt will remain safe throughout shift.

## 2024-07-04 NOTE — CARE PLAN
The patient's goals for the shift include      The clinical goals for the shift include Pt will remain safe throughout shift.      Problem: General Stroke  Goal: Establish a mutual long term goal with patient by discharge  Outcome: Progressing  Goal: Demonstrate improvement in neurological exam throughout the shift  Outcome: Progressing  Goal: Maintain BP within ordered limits throughout shift  Outcome: Progressing  Goal: Participate in treatment (ie., meds, therapy) throughout shift  Outcome: Progressing  Goal: No symptoms of aspiration throughout shift  Outcome: Progressing  Goal: No symptoms of hemorrhage throughout shift  Outcome: Progressing  Goal: Tolerate enteral feeding throughout shift  Outcome: Progressing  Goal: Decreased nausea/vomiting throughout shift  Outcome: Progressing  Goal: Controlled blood glucose throughout shift  Outcome: Progressing  Goal: Out of bed three times today  Outcome: Progressing     Problem: Fall/Injury  Goal: Not fall by end of shift  Outcome: Progressing  Goal: Be free from injury by end of the shift  Outcome: Progressing  Goal: Verbalize understanding of personal risk factors for fall in the hospital  Outcome: Progressing  Goal: Verbalize understanding of risk factor reduction measures to prevent injury from fall in the home  Outcome: Progressing  Goal: Use assistive devices by end of the shift  Outcome: Progressing  Goal: Pace activities to prevent fatigue by end of the shift  Outcome: Progressing     Problem: Pain - Adult  Goal: Verbalizes/displays adequate comfort level or baseline comfort level  Outcome: Progressing     Problem: Safety - Adult  Goal: Free from fall injury  Outcome: Progressing     Problem: Discharge Planning  Goal: Discharge to home or other facility with appropriate resources  Outcome: Progressing     Problem: Chronic Conditions and Co-morbidities  Goal: Patient's chronic conditions and co-morbidity symptoms are monitored and maintained or  improved  Outcome: Progressing     Problem: Pain  Goal: Takes deep breaths with improved pain control throughout the shift  Outcome: Progressing  Goal: Turns in bed with improved pain control throughout the shift  Outcome: Progressing  Goal: Walks with improved pain control throughout the shift  Outcome: Progressing  Goal: Performs ADL's with improved pain control throughout shift  Outcome: Progressing  Goal: Participates in PT with improved pain control throughout the shift  Outcome: Progressing  Goal: Free from opioid side effects throughout the shift  Outcome: Progressing  Goal: Free from acute confusion related to pain meds throughout the shift  Outcome: Progressing     Problem: Skin  Goal: Decreased wound size/increased tissue granulation at next dressing change  Outcome: Progressing  Goal: Participates in plan/prevention/treatment measures  Outcome: Progressing  Goal: Prevent/manage excess moisture  Outcome: Progressing  Goal: Prevent/minimize sheer/friction injuries  Outcome: Progressing  Goal: Promote/optimize nutrition  Outcome: Progressing  Goal: Promote skin healing  Outcome: Progressing

## 2024-07-04 NOTE — CONSULTS
Inpatient consult to Neurology  Consult performed by: Naveen Gonzalez MD  Consult ordered by: David Mcmahan,         C C : Confusion    History Of Present Illness  Sol Gomez is a 84 y.o. female presenting with urinary tract infection and confusion.  In addition it is associated with intermittent rhythmic movements of left upper extremity mostly proximal at the shoulder and left cervical region and the started few weeks ago.  Patient is able to recognize her granddaughter at the bedside she was able to tell her full name also patient was able to tell her name date of birth and she knew that she is in the hospital but she could not give me the details as she had urinary tract infection.    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  Patient has chronic diabetes insipidus and she is on desmopressin.  Patient denies headache blurred vision loss of vision any hearing or speech or swallow issues she is moving all extremities symmetrically.    Past Medical and Surgical History    Past Medical History:   Diagnosis Date    Atrial fibrillation (Multi) 06/28/2024    CVA (cerebral vascular accident) (Multi) 12/03/2010    Essential hypertension 06/28/2024    Syncope and collapse 06/28/2024          Past Surgical History:   Procedure Laterality Date    MR HEAD ANGIO WO IV CONTRAST  7/15/2012    MR HEAD ANGIO WO IV CONTRAST [facility]    MR HEAD ANGIO WO IV CONTRAST  1/11/2013    MR HEAD ANGIO WO IV CONTRAST [facility]        Social History     Allergies  Aspirin, Propoxyphene, Salicylates, and Allergenic ext-tree pollen  Medications Prior to Admission   Medication Sig Dispense Refill Last Dose    acetaminophen (TylenoL) 325 mg tablet Take 1 tablet (325 mg) by mouth every 4 hours  if needed for moderate pain (4 - 6).       [] cephalexin (Keflex) 500 mg capsule Take 1 capsule (500 mg) by mouth 2 times a day for 7 days. (Patient not taking: Reported on 7/3/2024) 14 capsule 0 Not Taking    desmopressin (DDAVP) 0.1 mg tablet Take 1 tablet (0.1 mg) by mouth 2 times a day.          Review of Systems   Constitutional:  Negative for activity change, appetite change, chills, diaphoresis, fatigue and fever.   HENT:  Negative for congestion, dental problem, drooling, ear discharge, sinus pressure, sinus pain, sneezing, sore throat, tinnitus, trouble swallowing and voice change.    Eyes:  Negative for pain, discharge, redness and itching.   Respiratory:  Negative for apnea, cough, choking, chest tightness, shortness of breath, wheezing and stridor.    Cardiovascular:  Negative for chest pain, palpitations and leg swelling.   Gastrointestinal:  Negative for abdominal distention, abdominal pain, anal bleeding, blood in stool, constipation, diarrhea, nausea, rectal pain and vomiting.   Endocrine: Positive for polyuria. Negative for cold intolerance, heat intolerance and polydipsia.        Hyponatremia chronic associated with diabetes insipidus.   Genitourinary:  Negative for difficulty urinating, dysuria, enuresis, flank pain, frequency and genital sores.   Musculoskeletal:  Negative for arthralgias, back pain, gait problem, joint swelling, myalgias, neck pain and neck stiffness.   Skin:  Negative for color change, pallor, rash and wound.   Allergic/Immunologic: Negative for environmental allergies, food allergies and immunocompromised state.   Neurological:  Negative for dizziness, seizures, facial asymmetry, speech difficulty, light-headedness, numbness and headaches.   Hematological:  Negative for adenopathy. Does not bruise/bleed easily.   Psychiatric/Behavioral:  Negative for agitation, behavioral problems, confusion, decreased concentration, dysphoric mood, hallucinations, self-injury, sleep  "disturbance and suicidal ideas. The patient is not nervous/anxious and is not hyperactive.          Cardiovascular system: S1-S2 intact  Respiratory clear to auscultation bilateral on deep breathing he feels irritability on the left side of the chest.    Neurological Exam      Last Recorded Vitals  Blood pressure 130/66, pulse 84, temperature 36.5 °C (97.7 °F), temperature source Temporal, resp. rate 20, height 1.651 m (5' 5\"), weight 69.9 kg (154 lb 1.6 oz), SpO2 95%.    Relevant Results      Current Facility-Administered Medications:     acetaminophen (Tylenol) tablet 650 mg, 650 mg, oral, q6h PRN, Jo-Ann Melvin DO    atorvastatin (Lipitor) tablet 80 mg, 80 mg, oral, Nightly, Bismark Guerrero DO, 80 mg at 07/03/24 2011    cefTRIAXone (Rocephin) IVPB 1 g, 1 g, intravenous, q24h, Bismark Guerrero DO, Stopped at 07/04/24 0015    [Held by provider] desmopressin (DDAVP) tablet 0.1 mg, 0.1 mg, oral, BID, Jo-Ann Melvin DO, 0.1 mg at 07/03/24 2011    enoxaparin (Lovenox) syringe 40 mg, 40 mg, subcutaneous, q24h, Jo-Ann Melvin DO    hydrALAZINE (Apresoline) injection 10 mg, 10 mg, intravenous, q20 min PRN **FOLLOWED BY** [START ON 7/5/2024] hydrALAZINE (Apresoline) tablet 25 mg, 25 mg, oral, q6h PRN, Bismark Guerrero DO    labetaloL (Normodyne,Trandate) injection 10 mg, 10 mg, intravenous, q10 min PRN, Bismark Guerrero DO    methocarbamol (Robaxin) tablet 500 mg, 500 mg, oral, q8h PRN, Jo-Ann Melvin DO, 500 mg at 07/03/24 1550    oxyCODONE (Roxicodone) immediate release tablet 2.5 mg, 2.5 mg, oral, q6h PRN, Jo-Ann Melvin DO    oxyCODONE (Roxicodone) immediate release tablet 5 mg, 5 mg, oral, q6h PRN, Jo-Ann Melvin DO, 5 mg at 07/03/24 1550    oxygen (O2) therapy, , inhalation, Continuous PRN - O2/gases, Bismark Guerrero DO    sodium chloride 0.9% infusion, 100 mL/hr, intravenous, Continuous, Joni DO Jina, Last Rate: 100 mL/hr at 07/03/24 2341, 100 mL/hr at 07/03/24 2341     Continuous medications    PRN " medications, reviewed    NIH Stroke Scale  1A. Level of Consciousness: Alert, Keenly Responsive  1B. Ask Month and Age: Both Questions Right  1C. Blink Eyes & Squeeze Hands: Performs Both Tasks  2. Best Gaze: Normal  3. Visual: No Visual Loss  4. Facial Palsy: Normal Symmetrical Movements  5A. Motor - Left Arm: No Drift  5B. Motor - Right Arm: No Drift  6A. Motor - Left Leg: No Drift  6B. Motor - Right Leg: No Drift  7. Limb Ataxia: Absent  8. Sensory Loss: Normal  9. Best Language: No Aphasia  10. Dysarthria: Normal  11. Extinction and Inattention: No Abnormality  NIH Stroke Scale: 0           [place] Coma Scale  Best Eye Response: Spontaneous  Best Verbal Response: Oriented  Best Motor Response: Follows commands  [place] Coma Scale Score: 15                  I have personally reviewed the following imaging for brain (CT/ MR) and results and discussed in detail with the patient/care giver/family, dated 7-3-2024. No acute stroke. Chronic left basal ganglia lacunar stroke      Assessment/Plan   Complex case with the following possibilities include    UTI related encephalopathy- 1 day  Ceftriaxone can also result in worsening of encephalopathy    Left upper limbs abnormal movements- 2 weeks  Left upper extremity irregular shoulder and cervical movements are nonspecific and may be related to encephalopathy or could be side effect of chronic hyponatremia in the setting of diabetes insipidus patient is advised to see movement disorder expert Dr. Chris Riggs at [facility] or [place]    Concern about autoimmune encephalitis: autoantibodies:  Anti NMDAR, GABAa, GABAb, AMPA and glycine receptors. AQP4,   voltage-gated potassium channels. 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    Patient/Family Education: Extensive time was spent educating the patient on relevant anatomy, clinical findings and imaging, as well as discussing the potential diagnoses as discussed above.  Pharmacology: as above. Exercise: I discussed the importance of maintaining a daily exercise program, including stretching and strengthening. Preventative strategies were reviewed, specifically avoidance of any exercises that exacerbate pain.Return to online virtual visit/ clinic visit for follow-up with movement disorders expert in 2 weeks or sooner as needed.The patient expressed understanding and agreement with the assessment and plan.  Patient encouraged to contact us should they have any questions, concerns, or any changes in symptoms. Thank you for allowing me to participate in the care of your patient.** This note is created using speech recognition transcription software. Despite proofreading, several typographical errors might be present that might affect the meaning of the content. Please call with any questions.**          Naveen Gonzalez MD

## 2024-07-04 NOTE — PROGRESS NOTES
Occupational Therapy                 Therapy Communication Note    Patient Name: Sol Gomez  MRN: [id number]  Today's Date: 7/4/2024     Discipline: Occupational Therapy    Comment: OT orders acknowleged; communication with nursing via secure chat following chart review. Pt confused and currently in restraints for pt safety.  Pt not appropriate to particiate in functional assessment.  Eval not complete.

## 2024-07-04 NOTE — PROGRESS NOTES
Patient: Sol Gomez Age: 84 y.o.   Gender: female Room/bed: 219/219-B     Attending: David Mcmahan DO  Code Status:  Full Code    Overnight Events  Overnight, patient agitated and throwing her phone. Placed in soft wrist restraints.     Subjective  Patient seen and examined this morning, agitated and somewhat confused. No complaints of pain.     Objective:  Physical Exam  Constitutional:       General: She is not in acute distress.     Appearance: Normal appearance.   Cardiovascular:      Rate and Rhythm: Normal rate and regular rhythm.      Heart sounds: Normal heart sounds. No murmur heard.     No friction rub. No gallop.   Pulmonary:      Effort: Pulmonary effort is normal. No respiratory distress.      Breath sounds: Normal breath sounds. No wheezing, rhonchi or rales.   Abdominal:      General: Abdomen is flat. Bowel sounds are normal. There is no distension.      Palpations: Abdomen is soft.      Tenderness: There is no abdominal tenderness.   Musculoskeletal:         General: No swelling or tenderness. Normal range of motion.   Skin:     General: Skin is warm and dry.      Findings: No erythema or rash.   Neurological:      Mental Status: She is alert. She is disoriented.      Comments: Patient in and out of confusion, agitated. Requiring soft wrist restraints.           Temp:  [35.8 °C (96.4 °F)-36.9 °C (98.4 °F)] 36.9 °C (98.4 °F)  Heart Rate:  [74-86] 74  Resp:  [11-22] 20  BP: (126-146)/(62-83) 146/79      Intake/Output Summary (Last 24 hours) at 7/4/2024 1358  Last data filed at 7/4/2024 1059  Gross per 24 hour   Intake 1235.42 ml   Output 450 ml   Net 785.42 ml       Vitals:    07/03/24 1407   Weight: 69.9 kg (154 lb 1.6 oz)             I/Os    Intake/Output Summary (Last 24 hours) at 7/4/2024 1358  Last data filed at 7/4/2024 1059  Gross per 24 hour   Intake 1235.42 ml   Output 450 ml   Net 785.42 ml       Labs:   CBC:  Recent Labs     07/04/24  0551 07/03/24  1040 06/27/24  0943   WBC 10.3  "10.3 6.1   HGB 11.5* 12.5 12.8   HCT 33.5* 36.7 39.7    210 175   MCV 85 86 92     CMP:  Recent Labs     07/04/24  1149 07/04/24  0551 07/03/24  2300   * 125* 122*   K 3.9 3.9 4.2   CL 95* 92* 89*   CO2 25 24 23   ANIONGAP 12 13 14   BUN 9 10 10   CREATININE 0.59 0.55 0.52   EGFR 89 >90 >90   GLUCOSE 134* 90 110*     Recent Labs     07/04/24  1149 07/04/24  0551 07/03/24  2300 07/03/24  1628 07/03/24  1039 06/27/24  0943 06/24/24  1531   ALBUMIN 4.0 3.8 3.8   < > 4.4 4.3 4.2   ALKPHOS  --   --   --   --  63 60 58   ALT  --   --   --   --  14 11 11   AST  --   --   --   --  18 13 14   BILITOT  --   --   --   --  0.7 0.5 0.4    < > = values in this interval not displayed.     Calcium/Phos:   Lab Results   Component Value Date    CALCIUM 8.9 07/04/2024    PHOS 3.2 07/04/2024      COAG:   Recent Labs     07/03/24  1039   INR 1.1     CRP: No results found for: \"CRP\"     ENDO:  Recent Labs     06/27/24  0943 10/02/23  1133   TSH 2.19 2.03   HGBA1C 5.3  --       CARDIAC:   Recent Labs     07/03/24  1039 06/24/24  1645 06/24/24  1531 10/10/22  1900   TROPHS 8 9 10  --    BNP  --   --  306* 85     Recent Labs     06/27/24  0943 10/02/23  1133   CHOL 201* 224*   LDLCALC 115* 133*   HDL 62.7 71.7   TRIG 118 97     No data recorded    Micro/ID:   Susceptibility data from last 90 days.  Collected Specimen Info Organism Ampicillin Cefazolin Cefazolin (uncomplicated UTIs only) Ciprofloxacin Gentamicin Nitrofurantoin Piperacillin/Tazobactam Tobramycin Trimethoprim/Sulfamethoxazole   06/24/24 Urine from Clean Catch/Voided Escherichia coli  S  S  S  S  R  S  S  R  S                    No lab exists for component: \"AGALPCRNB\"   .ID  Lab Results   Component Value Date    URINECULTURE >100,000 Escherichia coli (A) 06/24/2024       Images:  CT angio head and neck w and wo IV contrast  Narrative: Interpreted By:  Rupesh Del Rosario,   STUDY:  CT ANGIO HEAD AND NECK W AND WO IV CONTRAST;  7/3/2024 6:46 pm    "   INDICATION:  Signs/Symptoms:Stroke rule out      COMPARISON:  07/03/2024 CT head without contrast, 07/03/2024 MRI brain      ACCESSION NUMBER(S):  [id number]      ORDERING CLINICIAN:  MARÍA DREW      TECHNIQUE:  Multiple contiguous axial noncontrast images of the head were  obtained. Following IV contrast administration of iodinated contrast,  a CT angiography of the head and neck was performed. MIPS and 3D  reconstructions of the [place] and neck were created on an  independent workstation and reviewed.      FINDINGS:  CTA NECK:              LEFT VERTEBRAL ARTERY:  No hemodynamically significant stenosis,  occlusion, or dissection.      LEFT COMMON/INTERNAL CAROTID ARTERY: Minimal calcific atherosclerosis  at the bulb. No hemodynamically significant stenosis, occlusion, or  dissection.      RIGHT VERTEBRAL ARTERY: Non dominant. No hemodynamically significant  stenosis, occlusion, or dissection.      RIGHT COMMON/INTERNAL CAROTID ARTERY: Mild calcified atherosclerosis  of the distal CCA and carotid bulb. No hemodynamically significant  stenosis, occlusion, or dissection.          The neck soft tissues show no evidence of mass, fluid collection, or  enlarged lymph nodes. Multinodular goiter. There is no acute osseous  abnormality. Multilevel degenerative changes of the cervical spine  with moderate canal stenosis at C5-C6 and mild canal stenosis at  C6-C7 due to disc spur complexes. There is also multilevel foraminal  stenosis that is notably severe bilaterally at C3-C4, moderate-severe  bilaterally at C4-C5, severe bilaterally at C5-C6, and moderate on  the left at C6-C7.              CTA HEAD:      ANTERIOR CIRCULATION: No aneurysm.      - Internal Carotid Arteries: Mild calcific atherosclerosis  bilaterally. No hemodynamically significant stenosis or occlusion.      - Middle Cerebral Arteries:  No hemodynamically significant stenosis  or occlusion.      - Anterior Cerebral Arteries: There are few  areas of severe focal  stenosis of the left pericallosal anterior cerebral artery. There is  a single area of mild stenosis of the right pericallosal anterior  cerebral artery.          POSTERIOR CIRCULATION: No aneurysm.      - Intracranial Vertebral Arteries: Minimal calcific atherosclerosis  bilaterally. No hemodynamically significant stenosis or occlusion.  The PICA are patent bilaterally.      - Basilar Artery:  No hemodynamically significant stenosis or  occlusion.      - Posterior Cerebral Arteries: There is a focal severe stenosis of  the right calcarine artery. Otherwise, no hemodynamically significant  stenosis or occlusion.      No arteriovenous malformation is visualized.  No pathologic intracranial enhancement or discrete mass.  The dural venous sinuses are patent.      MIPS and 3D reconstructions confirm the above findings.      Impression: 1. No large vessel intracranial or extracranial occlusion.  2. No hemodynamically significant stenosis in the neck.  3. 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 Several areas  of severe focal stenosis in the left pericallosal anterior cerebral  artery.  4. Multilevel cervical spondylosis resulting in high-grade foraminal  stenosis at most levels as detailed above, and moderate canal  stenosis at C5-C6.              MACRO:  None.      Signed by: Rupesh Del Rosario 7/3/2024 7:18 PM  Dictation workstation:   [id number]  MR brain wo IV contrast  Narrative: Interpreted By:  Arnol Gonzales,   STUDY:  MR BRAIN WO IV CONTRAST; 7/3/2024 5:15 pm      INDICATION:  Signs/Symptoms:Stroke like symptoms;      COMPARISON:  CT brain 07/30/2024      ACCESSION NUMBER(S):  [id number]      ORDERING CLINICIAN:  MARÍA DREW      TECHNIQUE:  Routine brain MRI protocol without  contrast including diffusion and  gradient echo images. Incomplete acquired sequences due to patient's  inability to remain motion less.      FINDINGS:  RESULT:      Examination is severely  degraded by motion. Incomplete acquisition of  sequences due to patient's inability to remain immobile. DWI images  within the limits of evaluation, demonstrates no acute abnormality.      For aspirin echo T2 with a sequence, demonstrate no acute  intracranial abnormality. Patchy and confluent foci of hyperintense  signal on T2 weighted and FLAIR images are noted involving the  supratentorial white matter are nonspecific but likely represent  moderate microvascular ischemia. Sequela of multiple prior  supratentorial infarcts. Moderate generalized volume loss with  concordant ventriculomegaly.      Impression: Limited examination due to incomplete sequences. Within the limits of  evaluation as described above, no acute intracranial abnormality.      Signed by: Arnol Gonzales 7/3/2024 5:33 PM  Dictation workstation:   [id number]  Transthoracic Echo (TTE) [facility], [address] [place] [address]                Tel [phone] and Fax [phone]    TRANSTHORACIC ECHOCARDIOGRAM REPORT       Patient Name:      RYAN ELIOT   Reading Physician:    [id number] Nancy Quezada MD  Study Date:        7/3/2024            Ordering Provider:    [id number] MARÍA DREW  MRN/PID:           [id number]            Fellow:  Accession#:        [id number]        Nurse:                Elissa Kidd RN  Date of Birth/Age: 1940 / 84      Sonographer:          Luma leblanc RDCS  Gender:            F                   Additional Staff:  Height:            165.10 cm           Admit Date:           7/3/2024  Weight:            69.85 kg            Admission Status:     Observation -                                                                Routine  BSA / BMI:         1.77 m2 / 25.63     Encounter#:           [id number]                     kg/m2  Blood Pressure:    134/82 mmHg         Department Location:  [facility] Non                                                               Invasive    Study Type:    TRANSTHORACIC ECHO (TTE) COMPLETE  Diagnosis/ICD: Syncope and collapse-R55  Indication:    Syncope, Falls  CPT Code:      Echo Complete w Full Doppler-36905    Patient History:  Pertinent History: TIA and Falls.    Study Detail: The following Echo studies were performed: 2D, M-Mode, Doppler and                color flow. Technically challenging study due to Pt movement                throughout study. Agitated saline used as a contrast agent for                intraseptal flow evaluation. The patient was awake.       PHYSICIAN INTERPRETATION:  Left Ventricle: The left ventricular systolic function is low normal, with a visually estimated ejection fraction of 50-55%. The left ventricular cavity size is normal. Spectral Doppler shows an impaired relaxation pattern of left ventricular diastolic filling.  Left Atrium: The left atrium is normal in size. A bubble study using agitated saline was performed. Bubble study is negative.  Right Ventricle: The right ventricle is normal in size. There is normal right ventricular global systolic function.  Right Atrium: The right atrium is normal in size.  Aortic Valve: The aortic valve was not well visualized. There is no evidence of aortic valve stenosis. The aortic valve dimensionless index is 0.51. There is no evidence of aortic valve regurgitation. The peak instantaneous gradient of the aortic valve is 9.8 mmHg. The mean gradient of the aortic valve is 5.4 mmHg.  Mitral Valve: The mitral valve is mild to moderately thickened. There is mild mitral valve regurgitation.  Tricuspid Valve: The tricuspid valve was not well visualized. There is trace to mild tricuspid regurgitation. The right ventricular systolic  pressure is unable to be estimated.  Pulmonic Valve: The pulmonic valve is not well visualized. There is physiologic pulmonic valve regurgitation.  Pericardium: There is no pericardial effusion noted. There is a pericardial fat pad present.  Aorta: The aortic root was not well visualized.  Systemic Veins: The inferior vena cava size appears small. There is IVC inspiratory collapse greater than 50%.  In comparison to the previous echocardiogram(s): There are no prior studies on this patient for comparison purposes.       CONCLUSIONS:   1. The left ventricular systolic function is low normal, with a visually estimated ejection fraction of 50-55%.   2. Spectral Doppler shows an impaired relaxation pattern of left ventricular diastolic filling.   3. There is normal right ventricular global systolic function.   4. Aortic valve stenosis is not present.    QUANTITATIVE DATA SUMMARY:  2D MEASUREMENTS:                           Normal Ranges:  IVSd:          1.13 cm   (0.6-1.1cm)  LVPWd:         0.56 cm   (0.6-1.1cm)  LVIDd:         5.00 cm   (3.9-5.9cm)  LVIDs:         3.58 cm  LV Mass Index: 82.2 g/m2  LV % FS        28.4 %    LA VOLUME:                                Normal Ranges:  LA Vol A4C:        23.3 ml    (22+/-6mL/m2)  LA Vol A2C:        43.0 ml  LA Vol BP:         31.6 ml  LA Vol Index A4C:  13.1 ml/m2  LA Vol Index A2C:  24.3 ml/m2  LA Vol Index BP:   17.9 ml/m2  LA Area A4C:       11.7 cm2  LA Area A2C:       15.9 cm2  LA Major Axis A4C: 5.0 cm  LA Major Axis A2C: 5.0 cm  LA Volume Index:   17.7 ml/m2  LA Vol A4C:        20.4 ml  LA Vol A2C:        40.5 ml    RA VOLUME BY A/L METHOD:                        Normal Ranges:  RA Area A4C: 10.6 cm2    LV SYSTOLIC FUNCTION BY 2D PLANIMETRY (MOD):                       Normal Ranges:  EF-A4C View:    47 % (>=55%)  EF-A2C View:    39 %  EF-Biplane:     42 %  EF-Visual:      53 %  LV EF Reported: 53 %    LV DIASTOLIC FUNCTION:                           Normal  Ranges:  MV Peak E:    0.67 m/s   (0.7-1.2 m/s)  MV Peak A:    0.98 m/s   (0.42-0.7 m/s)  E/A Ratio:    0.68       (1.0-2.2)  MV e'         0.065 m/s  (>8.0)  MV lateral e' 0.07 m/s  MV medial e'  0.06 m/s  MV A Dur:     77.07 msec  E/e' Ratio:   10.27      (<8.0)    MITRAL VALVE:                  Normal Ranges:  MV DT: 148 msec (150-240msec)    AORTIC VALVE:                                    Normal Ranges:  AoV Vmax:                1.57 m/s (<=1.7m/s)  AoV Peak P.8 mmHg (<20mmHg)  AoV Mean P.4 mmHg (1.7-11.5mmHg)  LVOT Max Tom:            0.93 m/s (<=1.1m/s)  AoV VTI:                 31.68 cm (18-25cm)  LVOT VTI:                16.08 cm  LVOT Diameter:           1.90 cm  (1.8-2.4cm)  AoV Area, VTI:           1.44 cm2 (2.5-5.5cm2)  AoV Area,Vmax:           1.69 cm2 (2.5-4.5cm2)  AoV Dimensionless Index: 0.51       RIGHT VENTRICLE:  RV Basal 2.50 cm  RV Mid   1.50 cm  RV Major 7.5 cm  RV s'    0.13 m/s    TRICUSPID VALVE/RVSP:                             Normal Ranges:  Peak TR Velocity: 0.99 m/s  RV Syst Pressure: 6.9 mmHg (< 30mmHg)  IVC Diam:         0.90 cm    PULMONIC VALVE:                       Normal Ranges:  PV Max Tom: 0.7 m/s  (0.6-0.9m/s)  PV Max P.9 mmHg    AORTA:  Asc Ao Diam 4.00 cm       [id number] Nancy Quezada MD  Electronically signed on 7/3/2024 at 3:50:57 PM       ** Final **  ECG 12 lead  Sinus rhythm with occasional Premature ventricular complexes  Possible Left atrial enlargement  Anterior infarct (cited on or before 2024)  Abnormal ECG  When compared with ECG of 2024 15:25,  Premature ventricular complexes are now Present  QRS axis Shifted left  XR humerus left  Narrative: Interpreted By:  Katie Peralta,   STUDY:  XR HUMERUS LEFT; ;  7/3/2024 12:17 pm      INDICATION:  Signs/Symptoms:L arm injury.      COMPARISON:  None.      ACCESSION NUMBER(S):  [id number]      ORDERING CLINICIAN:  ERON BURGESS      FINDINGS:  No acute fracture or  dislocation. Calcific tendinosis of the rotator  cuff.      Impression: No acute osseous abnormality.      Signed by: Katie Peralta 7/3/2024 12:32 PM  Dictation workstation:   [id number]  CT head wo IV contrast  Narrative: Interpreted By:  Samantha Merritt,   STUDY:  CT HEAD WO IV CONTRAST;  7/3/2024 11:21 am      INDICATION:  Signs/Symptoms:fall head injury.      COMPARISON:  06/24/2024      ACCESSION NUMBER(S):  [id number]      ORDERING CLINICIAN:  ERON BURGESS      TECHNIQUE:  Examination was performed in the axial plane using soft tissue and  bone algorithm.      FINDINGS:  INTRACRANIAL:  There is prominence of the ventricular system and cerebral sulci  consistent with cerebral atrophy. There are periventricular  hypodensities consistent with  marked small vessel disease. No mass  or mass effect is identified. There is no hemorrhage or subdural  fluid collection. There is no acute infarct.  There are small remote bilateral thalamic infarcts, seen previously.  There is no fracture of the calvarium.      EXTRACRANIAL:  Visualized paranasal sinuses and mastoids are clear.      Impression: No acute intracranial pathology.      MACRO:  None      Signed by: Samantha Merritt 7/3/2024 11:45 AM  Dictation workstation:   [id number]       Medications:  Scheduled medications  atorvastatin, 80 mg, oral, Nightly  cefTRIAXone, 1 g, intravenous, q24h  [Held by provider] desmopressin, 0.1 mg, oral, BID  enoxaparin, 40 mg, subcutaneous, q24h  gabapentin, 300 mg, oral, TID      Continuous medications  sodium chloride 0.9%, 100 mL/hr, Last Rate: 100 mL/hr (07/03/24 0251)      PRN medications  PRN medications: acetaminophen, hydrALAZINE **FOLLOWED BY** [START ON 7/5/2024] hydrALAZINE, labetaloL, oxygen     Assessment and Plan:  Sol Gomez is a 84 y.o. female admitted on 7/3/2024 with a pmh of multiple CVAs, Diabetes Insipidus, carpal tunnel syndrome, and HTN who presented to the hospital for multiple falls over the past  3 days and a burning sensation in her left upper extremity.     Acute Medical Issues:    # Acute Metabolic Encephalopathy   2/2 UTI vs Hyponatremia   - Pt requiring soft wrist restraints   - Continue Ceftriaxone     # C/f Autoimmune Encephalitis   - Pt presenting with L UE irregular movements   - CT head wo con showed chronic L basal ganglia lacunar stroke   - Neuro consulted, appreciate recs   - Pt refused Aspirin 81mg d/t epistaxis   - Atorvastatin 80 mg daily   - MRI brain wo contrast limited d/t movement   - MRA head/neck showed focal severe stenosis in right calcarine & left pericallosal ant cerebral arteries, focal high-grade cervical foraminal stenosis   - Anti NMDAR, GABAa, GABAb, AMPA and glycine receptors, AQP4, voltage-gated potassium channels ordered (pending call from lab client services to order) --> If (+) may need to start IVIG   - Start Gabapentin 300mg TID   - PT/OT     # Symptomatic Hyponatremia  # Hx Diabetes Insipidus  - Na 125 on admission (baseline wnl)    - Could be d/t desmopressin use for DI, urine electrolytes consistent with SIADH  - HOLD home Desmopressin 0.1mg BID  - Continue NS @ 100cc/hr  - Trend RFP every 6 hrs   - Nephrology consulted, appreciate recs     # Syncopal Episode  - Patient experienced a syncopal episode without prodrome 3 days prior to admission  - Echo 7/3/24 showed EF 50-55%  - EKG in ED indicated sinus rhythm with occasional premature ventricular complexes  - Orthostats pending      # UTI  - Patient UA showed indicated trace LE and blood  - Was discharged from [facility] on 6/[facility] for UTI. Culture at the time indicated E.Coli, and patient received a course of Cephelexin   - Urine Culture (6/24/24): E. Coli   - Continue Ceftriaxone     Chronic Medical Issues:    #Carpal Tunnel Syndrome-Pain Medication PRN  #HTN-Held Amlodipine for CVA workup. Hydralazine and Labatelol PRN for SBP>220     Fluids: NS 100cc/hr   Nutrition: Regular, 1500cc restriction   Antimicrobials:  Rocephin  DVT ppx: Lovenox   GI ppx: None   Lines: PIV    Dispo: Correcting hyponatremia, workup pending for autoimmune encephalitis. Will need PT/OT eval prior to discharge.     Jo-Ann Melvin, DO  PGY-3, Internal Medicine

## 2024-07-05 ENCOUNTER — APPOINTMENT (OUTPATIENT)
Dept: CARDIOLOGY | Facility: HOSPITAL | Age: 84
DRG: 643 | End: 2024-07-05
Payer: MEDICARE

## 2024-07-05 DIAGNOSIS — R55 SYNCOPE AND COLLAPSE: Primary | ICD-10-CM

## 2024-07-05 LAB
ALBUMIN SERPL BCP-MCNC: 4 G/DL (ref 3.4–5)
ALBUMIN SERPL BCP-MCNC: 4.1 G/DL (ref 3.4–5)
ANION GAP SERPL CALC-SCNC: 11 MMOL/L (ref 10–20)
ANION GAP SERPL CALC-SCNC: 9 MMOL/L (ref 10–20)
BACTERIA UR CULT: ABNORMAL
BUN SERPL-MCNC: 13 MG/DL (ref 6–23)
BUN SERPL-MCNC: 17 MG/DL (ref 6–23)
CALCIUM SERPL-MCNC: 9.3 MG/DL (ref 8.6–10.3)
CALCIUM SERPL-MCNC: 9.6 MG/DL (ref 8.6–10.3)
CHLORIDE SERPL-SCNC: 103 MMOL/L (ref 98–107)
CHLORIDE SERPL-SCNC: 108 MMOL/L (ref 98–107)
CO2 SERPL-SCNC: 30 MMOL/L (ref 21–32)
CO2 SERPL-SCNC: 30 MMOL/L (ref 21–32)
CREAT SERPL-MCNC: 0.79 MG/DL (ref 0.5–1.05)
CREAT SERPL-MCNC: 0.88 MG/DL (ref 0.5–1.05)
EGFRCR SERPLBLD CKD-EPI 2021: 65 ML/MIN/1.73M*2
EGFRCR SERPLBLD CKD-EPI 2021: 74 ML/MIN/1.73M*2
ERYTHROCYTE [DISTWIDTH] IN BLOOD BY AUTOMATED COUNT: 13.4 % (ref 11.5–14.5)
GLUCOSE BLD MANUAL STRIP-MCNC: 121 MG/DL (ref 74–99)
GLUCOSE BLD MANUAL STRIP-MCNC: 127 MG/DL (ref 74–99)
GLUCOSE BLD MANUAL STRIP-MCNC: 130 MG/DL (ref 74–99)
GLUCOSE BLD MANUAL STRIP-MCNC: 99 MG/DL (ref 74–99)
GLUCOSE SERPL-MCNC: 109 MG/DL (ref 74–99)
GLUCOSE SERPL-MCNC: 115 MG/DL (ref 74–99)
HCT VFR BLD AUTO: 42.2 % (ref 36–46)
HGB BLD-MCNC: 13.6 G/DL (ref 12–16)
MAGNESIUM SERPL-MCNC: 2.37 MG/DL (ref 1.6–2.4)
MCH RBC QN AUTO: 28.9 PG (ref 26–34)
MCHC RBC AUTO-ENTMCNC: 32.2 G/DL (ref 32–36)
MCV RBC AUTO: 90 FL (ref 80–100)
NRBC BLD-RTO: 0 /100 WBCS (ref 0–0)
PHOSPHATE SERPL-MCNC: 3.2 MG/DL (ref 2.5–4.9)
PHOSPHATE SERPL-MCNC: 3.5 MG/DL (ref 2.5–4.9)
PLATELET # BLD AUTO: 235 X10*3/UL (ref 150–450)
POTASSIUM SERPL-SCNC: 3.9 MMOL/L (ref 3.5–5.3)
POTASSIUM SERPL-SCNC: 4 MMOL/L (ref 3.5–5.3)
RBC # BLD AUTO: 4.7 X10*6/UL (ref 4–5.2)
SODIUM SERPL-SCNC: 138 MMOL/L (ref 136–145)
SODIUM SERPL-SCNC: 145 MMOL/L (ref 136–145)
WBC # BLD AUTO: 9.2 X10*3/UL (ref 4.4–11.3)

## 2024-07-05 PROCEDURE — 2500000001 HC RX 250 WO HCPCS SELF ADMINISTERED DRUGS (ALT 637 FOR MEDICARE OP)

## 2024-07-05 PROCEDURE — 86053 AQAPRN-4 ANTB FLO CYTMTRY EA: CPT

## 2024-07-05 PROCEDURE — 86255 FLUORESCENT ANTIBODY SCREEN: CPT

## 2024-07-05 PROCEDURE — 36415 COLL VENOUS BLD VENIPUNCTURE: CPT

## 2024-07-05 PROCEDURE — 99233 SBSQ HOSP IP/OBS HIGH 50: CPT

## 2024-07-05 PROCEDURE — 99222 1ST HOSP IP/OBS MODERATE 55: CPT | Performed by: PHYSICIAN ASSISTANT

## 2024-07-05 PROCEDURE — 2500000002 HC RX 250 W HCPCS SELF ADMINISTERED DRUGS (ALT 637 FOR MEDICARE OP, ALT 636 FOR OP/ED)

## 2024-07-05 PROCEDURE — 2500000005 HC RX 250 GENERAL PHARMACY W/O HCPCS

## 2024-07-05 PROCEDURE — 36415 COLL VENOUS BLD VENIPUNCTURE: CPT | Performed by: INTERNAL MEDICINE

## 2024-07-05 PROCEDURE — 80069 RENAL FUNCTION PANEL: CPT

## 2024-07-05 PROCEDURE — 94760 N-INVAS EAR/PLS OXIMETRY 1: CPT

## 2024-07-05 PROCEDURE — 83519 RIA NONANTIBODY: CPT

## 2024-07-05 PROCEDURE — 82947 ASSAY GLUCOSE BLOOD QUANT: CPT

## 2024-07-05 PROCEDURE — 2500000004 HC RX 250 GENERAL PHARMACY W/ HCPCS (ALT 636 FOR OP/ED)

## 2024-07-05 PROCEDURE — 99232 SBSQ HOSP IP/OBS MODERATE 35: CPT

## 2024-07-05 PROCEDURE — 93005 ELECTROCARDIOGRAM TRACING: CPT

## 2024-07-05 PROCEDURE — 83735 ASSAY OF MAGNESIUM: CPT

## 2024-07-05 PROCEDURE — 85027 COMPLETE CBC AUTOMATED: CPT

## 2024-07-05 PROCEDURE — 1200000002 HC GENERAL ROOM WITH TELEMETRY DAILY

## 2024-07-05 RX ORDER — POTASSIUM CHLORIDE 20 MEQ/1
20 TABLET, EXTENDED RELEASE ORAL ONCE
Status: COMPLETED | OUTPATIENT
Start: 2024-07-05 | End: 2024-07-05

## 2024-07-05 RX ORDER — DESMOPRESSIN ACETATE 0.1 MG/1
0.05 TABLET ORAL 2 TIMES DAILY
Status: DISCONTINUED | OUTPATIENT
Start: 2024-07-05 | End: 2024-07-08 | Stop reason: HOSPADM

## 2024-07-05 RX ORDER — METOPROLOL TARTRATE 25 MG/1
12.5 TABLET, FILM COATED ORAL 2 TIMES DAILY
Status: DISCONTINUED | OUTPATIENT
Start: 2024-07-05 | End: 2024-07-05

## 2024-07-05 RX ORDER — METOPROLOL TARTRATE 25 MG/1
25 TABLET, FILM COATED ORAL 2 TIMES DAILY
Status: DISCONTINUED | OUTPATIENT
Start: 2024-07-05 | End: 2024-07-08 | Stop reason: HOSPADM

## 2024-07-05 ASSESSMENT — COGNITIVE AND FUNCTIONAL STATUS - GENERAL
EATING MEALS: A LITTLE
MOVING FROM LYING ON BACK TO SITTING ON SIDE OF FLAT BED WITH BEDRAILS: A LITTLE
MOVING FROM LYING ON BACK TO SITTING ON SIDE OF FLAT BED WITH BEDRAILS: A LOT
HELP NEEDED FOR BATHING: A LITTLE
DRESSING REGULAR UPPER BODY CLOTHING: A LOT
PERSONAL GROOMING: A LITTLE
STANDING UP FROM CHAIR USING ARMS: A LITTLE
CLIMB 3 TO 5 STEPS WITH RAILING: A LOT
STANDING UP FROM CHAIR USING ARMS: A LOT
PERSONAL GROOMING: A LOT
TOILETING: A LOT
TOILETING: A LOT
MOVING TO AND FROM BED TO CHAIR: A LITTLE
TURNING FROM BACK TO SIDE WHILE IN FLAT BAD: A LITTLE
MOBILITY SCORE: 16
DRESSING REGULAR UPPER BODY CLOTHING: A LITTLE
MOBILITY SCORE: 10
DRESSING REGULAR LOWER BODY CLOTHING: A LOT
MOVING TO AND FROM BED TO CHAIR: A LOT
HELP NEEDED FOR BATHING: A LOT
WALKING IN HOSPITAL ROOM: TOTAL
CLIMB 3 TO 5 STEPS WITH RAILING: TOTAL
WALKING IN HOSPITAL ROOM: A LOT
DAILY ACTIVITIY SCORE: 17
DRESSING REGULAR LOWER BODY CLOTHING: A LITTLE
DAILY ACTIVITIY SCORE: 13
EATING MEALS: A LITTLE
TURNING FROM BACK TO SIDE WHILE IN FLAT BAD: A LOT

## 2024-07-05 ASSESSMENT — PAIN SCALES - GENERAL
PAINLEVEL_OUTOF10: 0 - NO PAIN
PAINLEVEL_OUTOF10: 0 - NO PAIN

## 2024-07-05 ASSESSMENT — PAIN - FUNCTIONAL ASSESSMENT: PAIN_FUNCTIONAL_ASSESSMENT: 0-10

## 2024-07-05 NOTE — PROGRESS NOTES
"Sol Gomez is a 84 y.o. female on day 1 of admission presenting with Acute metabolic encephalopathy.      Subjective   Tachycardia this am  Improved Na   Dessmo remains to be on hold  ~2L UOP yday          Objective          Vitals 24HR  Heart Rate:  [74-91]   Temp:  [35.7 °C (96.3 °F)-37.2 °C (99 °F)]   Resp:  [16-20]   BP: (103-129)/(63-85)   SpO2:  [93 %-100 %]         Intake/Output last 3 Shifts:    Intake/Output Summary (Last 24 hours) at 7/5/2024 1143  Last data filed at 7/5/2024 1022  Gross per 24 hour   Intake 2535 ml   Output 1900 ml   Net 635 ml       Recent Labs     07/05/24  0640 07/04/24  2338 07/04/24  1749 07/04/24  1149 07/04/24  0551 07/03/24  2300 07/03/24  1628    138 134* 128* 125* 122* 123*   K 3.9 4.0 4.0 3.9 3.9 4.2 3.9   CO2 30 30 28 25 24 23 24   BUN 13 17 14 9 10 10 12   GLUCOSE 109* 115* 120* 134* 90 110* 116*   CALCIUM 9.6 9.3 9.0 8.9 8.5* 8.6 8.7   PHOS 3.2 3.5 3.1 3.2 2.9 3.2 2.9   CREATININE 0.79 0.88 0.76 0.59 0.55 0.52 0.54   EGFR 74 65 77 89 >90 >90 >90   ANIONGAP 11 9* 9* 12 13 14 13                Assessment/Plan    Sol Gomez is a 84 y.o. year old female  patient with pmh of multiple CVAs, Diabetes Insipidus on desmopressin, carpal tunnel syndrome, and HTN who presented to the hospital for multiple falls over the past 3 days and a burning sensation in her left upper extremity.  She was found to be hyponatremic-nephrology team was consulted     Impression  # Severe hyponatremia?  Symptomatic most likely iatrogenic due to desmopressin use for DI.  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  Currently desmopressin is on hold.  Na is @ 145 with concerns about possible over correction    # Altered mental status/recurrent fall  -Rule out CVA/arrhythmias     # Diabetes insipidus-chronic. Worsening off Desmo     # UTI-currently on ceftriaxone     # Hypertension-accepted.  Holding amlodipine home medicine     Recommendation plan  -Ok to resume " dessmopressin today - in lower dose  -no iv fluid or fluid restriction at this time  -discussed plan with primary team          Principal Problem:    Acute metabolic encephalopathy  Active Problems:    Hyponatremia      Mary Carmen Glover MD

## 2024-07-05 NOTE — PROGRESS NOTES
Occupational Therapy                 Therapy Communication Note    Patient Name: Sol Gomez  MRN: [id number]  Today's Date: 7/5/2024     Discipline: Occupational Therapy    Missed Visit Reason: Missed Visit Reason: Patient placed on medical hold (per RN, pt's cognition is improving but continues to be very agitated, requesting therapy hold at this time.)    Missed Time: Attempt

## 2024-07-05 NOTE — NURSING NOTE
Tachy on tele, , PVCs, released PRN EKG, done, sent to doctor, no new orders, patient asymptomatic     Pt. Sinus tach with PVCs, obtained vitals and EKG, notified provider, doctors seen patient at bedside,new orders , cardio consulted , patient stable, VSS

## 2024-07-05 NOTE — CARE PLAN
The patient's goals for the shift include      The clinical goals for the shift include Patient will remain free from injury theoughout shift    Over the shift, the patient did not make progress toward the following goals. Barriers to progression include . Recommendations to address these barriers include   Problem: Fall/Injury  Goal: Not fall by end of shift  Outcome: Progressing  Goal: Be free from injury by end of the shift  Outcome: Progressing  Goal: Verbalize understanding of personal risk factors for fall in the hospital  Outcome: Progressing  Goal: Verbalize understanding of risk factor reduction measures to prevent injury from fall in the home  Outcome: Progressing  Goal: Use assistive devices by end of the shift  Outcome: Progressing  Goal: Pace activities to prevent fatigue by end of the shift  Outcome: Progressing   .

## 2024-07-05 NOTE — PROGRESS NOTES
07/05/24 1330   Discharge Planning   Patient expects to be discharged to: Discussed case with [facility]. Therapy has not been able to work with pt due to mental staus. AVINASH called pt daughter Mellissa to discuss discharge plan. SW left message, waiting on call back.     AVINASH spoke with pt dtr. Dtr states pt will either discharge home or SNF. She is not interested in LTC at this time.

## 2024-07-05 NOTE — PROGRESS NOTES
"Sol Gomez is a 84 y.o. female on day 1 of admission presenting with Acute metabolic encephalopathy.    Subjective   Today patient was asking if she can have her medication desmopressin.    Patient was started gabapentin 300 mg at night for abnormal movement, chorea.     Objective     Physical Exam  Constitutional:       General: She is not in acute distress.     Appearance: Normal appearance. She is not ill-appearing, toxic-appearing or diaphoretic.   HENT:      Head: Normocephalic and atraumatic.   Musculoskeletal:      Cervical back: No rigidity.   Skin:     General: Skin is warm and dry.   Neurological:      Mental Status: She is alert and oriented to person, place, and time. Mental status is at baseline.   Psychiatric:         Mood and Affect: Mood normal.         Behavior: Behavior normal.     Normal cranial nerve from 2-12          Axis Coma Scale  Best Eye Response: Spontaneous  Best Verbal Response: Oriented  Best Motor Response: Follows commands  Axis Coma Scale Score: 15         Last Recorded Vitals  Blood pressure 103/64, pulse 91, temperature 36.4 °C (97.5 °F), temperature source Temporal, resp. rate 18, height 1.651 m (5' 5\"), weight 69.9 kg (154 lb 1.6 oz), SpO2 98%.  Intake/Output last 3 Shifts:  I/O last 3 completed shifts:  In: 3650.4 (52.2 mL/kg) [P.O.:740; I.V.:2810.4 (40.2 mL/kg); IV Piggyback:100]  Out: 2150 (30.8 mL/kg) [Urine:2150 (0.9 mL/kg/hr)]  Weight: 69.9 kg     Relevant Results              Assessment/Plan   Patient is 84 year old female with pmh of diabetes insipid, who presented to the ED with confusion and UTI. Patient was noted to have abnormal left shoulder and arm movement likely chorea for the past 2 weeks.     #Chorea   -can be due to abnormal electrolyte in setting of diabetes insipidus, or neurologic origine or unknown etiology  - Patient's movement are better on gabapentin 300 mg TID. Please continue on this regiment. Patient can follow up as outpatient  with Dr." Chris Riggs at [facility] or  [facility].  -PENDING labs such as anti NMDAR, GABAb, GABAa, AMPA and glycine receptors, AQP4     Dispo- patient can be discharge per medical team.      I discussed my plan with DR. Carlos Carias MD  Family medicine resident  PGY3

## 2024-07-05 NOTE — PROGRESS NOTES
07/05/24 1035   Discharge Planning   Living Arrangements Alone   Support Systems Children  (daughter, Mellissa)   Assistance Needed normally A&Ox3, Independent with ADL's, Doesn't drive, Rollator, Cane, Transfer chair, Grab bars, Shower chair, Raised toilet with rails, Daughter and neighbor transport to appointments.   Type of Residence Private residence   Number of Stairs to Enter Residence 4   Number of Stairs Within Residence 0   Do you have animals or pets at home? No   Home or Post Acute Services In home services   Patient expects to be discharged to: TBD, PT/OT evaluations pending   RoundTrip coordination needed? Yes   Has discharge transport been arranged? No

## 2024-07-05 NOTE — PROGRESS NOTES
Physical Therapy                 Therapy Communication Note    Patient Name: Sol Gomez  MRN: [id number]  Today's Date: 7/5/2024     Discipline: Physical Therapy    Missed Visit Reason: Missed Visit Reason: Patient placed on medical hold (per RN, pt's cognition is improving but continues to be very agitated, requesting therapy hold at this time.)    Missed Time: Attempt    Comment:

## 2024-07-05 NOTE — HOSPITAL COURSE
The patient presented to the ED for multiple falls over the past 3 days, a syncopal episode on Sunday, increased slurring of speech, and left arm numbness with a burning sensation. The patient's daughter is her power of  and was present at bedside. Upon initial assessment in the ED, the patient's vitals were stable. She had an NIH of 0. A CT head wo contrast indicated no acute process. An xray of the left humerus indicated no acute fracture or dislocation and calcific tendinosis of the rotator cuff. The labs in the ED were pertinent for a sodium level of 125. Troponins and WBC were unremarkable. The ECG indicated sinus rhythm with occasional PVCs. The patient's urinalysis indicated a UTI with positive leukocyte esterace. Urine cultures were taken. The patient was started on Rocephin and transferred to the general floor.    Upon arriving to the general floor, patient received an Echo, which indicated an EF of 55-60% with impaired left ventricular diastolic filling. An MRI brain was attempted, but examination was limited due to patient mobility. Within the limits of evaluation, their were no signs of acute intracranial abnormality. The patient needed to be placed in soft wrist restraints because of an inability to administer medication and for patient safety. Doctor saw and evaluated patient at bedside for restraint placement. The patient was started on a normal saline infusion at 75 ml/hr. Overnight, her sodium increased to 138. Fluid was then discontinued and the patient was placed on 1500ml fluid restriction. Her sodium continued to increase to 145. Nephrology was consulted and recommended continuing her home desmopressin at 0.05 mg BID and removing the fluid restriction.  Neurology was consulted and recommended initializing Gabapentin and obtaining autoimmune antibody labs, which could be followed-up outpatient.  The patient continued to experience PVCs with tachycardia. Cardiology was consulted and the  patient was initiated on Metoprolol Tartrate. PT evaluated the patient and recommended moderate intensity level of continued care. Patient was stable and ready for discharge on ***

## 2024-07-05 NOTE — CONSULTS
Inpatient consult to Cardiology  Consult performed by: Kristi Tracey PA-C  Consult ordered by: David Mcmahan DO        History Of Present Illness:    Sol Gomez is a 84 y.o. female presenting to the ED with recurrent falls. Patient recounts multiple episodes of falling. Her daughter is at bedside and helps provide some collateral history. Patient notes one episode where she was leaning forward to  her undergarments and then stumbled backwards falling into the shower, another episode where a dog ran under her feet and she could not catch her balance. However one episode prior to admission, pt states that she was sitting in her recliner chair when her vision began to go dark, she states that she then lost consciousness. She is unsure how long she was unconscious for and when she awoke she was her normal self. Denies any chest pain, pressure, palpitations, flushing prior to this episode. Currently she complains only of dry mouth. Denies any chest pain, chest pressure, palpitations, dizziness, cough, shortness of breath, orthopnea, edema.       Last Recorded Vitals:  Vitals:    07/05/24 0516 07/05/24 0747 07/05/24 1049 07/05/24 1113   BP: 129/85  103/64    BP Location: Left arm  Left arm    Patient Position: Lying  Lying    Pulse: 78  91    Resp: 20  18    Temp: 36.1 °C (97 °F)  36.4 °C (97.5 °F)    TempSrc: Temporal  Temporal    SpO2: 95% 98% 98% 98%   Weight:       Height:           Last Labs:  CBC - 7/5/2024:  6:39 AM  9.2 13.6 235    42.2      CMP - 7/5/2024:  6:40 AM  9.6 6.9 18 --- 0.7   3.2 4.1 14 63      PTT - No results in last year.  1.1   12.4 _     Troponin I, High Sensitivity   Date/Time Value Ref Range Status   07/03/2024 10:39 AM 8 0 - 13 ng/L Final   06/24/2024 04:45 PM 9 0 - 13 ng/L Final   06/24/2024 03:31 PM 10 0 - 13 ng/L Final     BNP   Date/Time Value Ref Range Status   06/24/2024 03:31  (H) 0 - 99 pg/mL Final   10/10/2022 07:00 PM 85 0 - 99 pg/mL Final     Comment:      .  <100 pg/mL - Heart failure unlikely  100-299 pg/mL - Intermediate probability of acute heart  .               failure exacerbation. Correlate with clinical  .               context and patient history.    >=300 pg/mL - Heart Failure likely. Correlate with clinical  .               context and patient history.  BNP testing is performed using different testing   methodology at [facility] than at other   [facility]. Direct result comparisons should   only be made within the same method.       Hemoglobin A1C   Date/Time Value Ref Range Status   06/27/2024 09:43 AM 5.3 see below % Final     LDL Calculated   Date/Time Value Ref Range Status   06/27/2024 09:43  (H) <=99 mg/dL Final     Comment:                                 Near   Borderline      AGE      Desirable  Optimal    High     High     Very High     0-19 Y     0 - 109     ---    110-129   >/= 130     ----    20-24 Y     0 - 119     ---    120-159   >/= 160     ----      >24 Y     0 -  99   100-129  130-159   160-189     >/=190     10/02/2023 11:33  (L) 140 - 190 mg/dL Final     Comment:                                 Near   Borderline      AGE      Desirable  Optimal    High     High     Very High     0-19 Y     0 - 109     ---    110-129   >/= 130     ----    20-24 Y     0 - 119     ---    120-159   >/= 160     ----      >24 Y     0 -  99   100-129  130-159   160-189     >/=190       VLDL   Date/Time Value Ref Range Status   06/27/2024 09:43 AM 24 0 - 40 mg/dL Final   10/02/2023 11:33 AM 19 0 - 40 mg/dL Final      Last I/O:  I/O last 3 completed shifts:  In: 3650.4 (52.2 mL/kg) [P.O.:740; I.V.:2810.4 (40.2 mL/kg); IV Piggyback:100]  Out: 2150 (30.8 mL/kg) [Urine:2150 (0.9 mL/kg/hr)]  Weight: 69.9 kg     Past Cardiology Tests (Last 3 Years):  EKG:  ECG 12 lead 07/05/2024 13:27 NSR 97 bp, with PVCs    EKG 07/05/2024 09:07 SR, 103 bpm with frequent PVCs      Echo:  Transthoracic Echo (TTE) Complete 07/03/2024  CONCLUSIONS:   1.  The left ventricular systolic function is low normal, with a visually estimated ejection fraction of 50-55%.   2. Spectral Doppler shows an impaired relaxation pattern of left ventricular diastolic filling.   3. There is normal right ventricular global systolic function.   4. Aortic valve stenosis is not present.    Ejection Fractions:  EF   Date/Time Value Ref Range Status   07/03/2024 03:21 PM 53 %      Cath:  No results found for this or any previous visit from the past 1095 days.    Stress Test:  No results found for this or any previous visit from the past 1095 days.    Imaging:  CT Angio Head and Neck (07/03/2024):  IMPRESSION:  1. No large vessel intracranial or extracranial occlusion.  2. No hemodynamically significant stenosis in the neck.  3. 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 Several areas  of severe focal stenosis in the left pericallosal anterior cerebral  artery.  4. Multilevel cervical spondylosis resulting in high-grade foraminal  stenosis at most levels as detailed above, and moderate canal  stenosis at C5-C6.    MRI Brain w/o (07/03/2024):  IMPRESSION:  Limited examination due to incomplete sequences. Within the limits of  evaluation as described above, no acute intracranial abnormality.    CT Head w/o (07/03/2024):  IMPRESSION:  No acute intracranial pathology.    Past Medical History:  She has a past medical history of Atrial fibrillation (Multi) (06/28/2024), CVA (cerebral vascular accident) (Multi) (12/03/2010), Essential hypertension (06/28/2024), and Syncope and collapse (06/28/2024).    Past Surgical History:  She has a past surgical history that includes MR angio head wo IV contrast (7/15/2012) and MR angio head wo IV contrast (1/11/2013).      Social History:  She has no history on file for tobacco use, alcohol use, and drug use.    Family History:  No family history on file.     Allergies:  Aspirin, Propoxyphene, Salicylates, and Allergenic ext-tree pollen    Inpatient  Medications:  Scheduled medications   Medication Dose Route Frequency    atorvastatin  80 mg oral Nightly    desmopressin  0.05 mg oral BID    enoxaparin  40 mg subcutaneous q24h    gabapentin  300 mg oral TID    metoprolol tartrate  12.5 mg oral BID    potassium chloride CR  20 mEq oral Once     PRN medications   Medication    acetaminophen    hydrALAZINE    labetaloL    oxygen     Continuous Medications   Medication Dose Last Rate     Outpatient Medications:  Current Outpatient Medications   Medication Instructions    acetaminophen (TYLENOL) 325 mg, oral, Every 4 hours PRN    desmopressin (DDAVP) 0.1 mg, oral, 2 times daily       Physical Exam:  Physical Exam  Constitutional:       Appearance: She is not ill-appearing.      Comments: Elderly female sitting in bed in NAD.    HENT:      Head: Normocephalic.      Nose: Nose normal.      Mouth/Throat:      Mouth: Mucous membranes are moist.   Eyes:      Conjunctiva/sclera: Conjunctivae normal.   Cardiovascular:      Rate and Rhythm: Regular rhythm. Tachycardia present.      Heart sounds: No murmur heard.  Pulmonary:      Effort: Pulmonary effort is normal.      Breath sounds: Normal breath sounds.   Abdominal:      Palpations: Abdomen is soft.   Musculoskeletal:      Right lower leg: No edema.      Left lower leg: No edema.   Skin:     General: Skin is warm.      Findings: Lesion present.   Neurological:      General: No focal deficit present.      Mental Status: She is alert.      Comments: Slurred Speech.      Psychiatric:         Mood and Affect: Mood normal.            Assessment/Plan   Sol Gomez is an 83 yo female with a PMH of HTN, CVA, Diabetes insipidus who presented with multiple falls c/f syncope.     #Syncope  #Sinus tachycardia with frequent PVCs    -Echocardiogram from this admission reviewed with normal LVSF, no sig valvular abnormalities  -recommend orthostatic VS  -Tele monitoring  -Start Metoprolol 25mg BID  -Recommend zio patch, EP follow up  at discharge   -Will sign off     Peripheral IV 07/04/24 22 G Left;Anterior Forearm (Active)   Site Assessment Clean;Dry;Intact 07/05/24 0738   Dressing Status Dry;Clean 07/05/24 0738   Number of days: 1       Code Status:  Full Code    I spent  minutes in the professional and overall care of this patient.        Kristi Tracey PA-C

## 2024-07-05 NOTE — PROGRESS NOTES
Internal Medicine - Daily Progress Note   Hospital Day: 3       Name:Sol Gomez, AGE: 84 y.o., GENDER: female, MRN: [id number], ROOM: 118/118-A   CODE STATUS: Full Code  Attending Physician: David Mcmahan DO  Resident: Bismark Guerrero DO        Chief Complaint     Chief Complaint   Patient presents with   • Weakness, Gen   • Fall     Pt has had multiple falls X 24 hours. Pt denies any injuries and reports recurrent left sided neck/shoulder pain. Pt denies blood thinners, LOC. Pt reports normally ambulating with a walker. Pt had a TIA X 3 weeks ago.        Subjective    Sol Gomez is a 84 y.o. year old female patient on Hospital Day: 3 with pmh of multiple CVAs, Diabetes Insipidus, carpal tunnel syndrome, and HTN who presented to the hospital for multiple falls over the past 3 days and a burning sensation in her left upper extremity     Overnight events:  She has been distressed about not receiving her home Desmopressin. She continues to experience slurring of speech, however patient is answering questions consistently and is oriented x3. Discontinued soft wrist restraints    Meds    Scheduled medications  atorvastatin, 80 mg, oral, Nightly  desmopressin, 0.05 mg, oral, BID  enoxaparin, 40 mg, subcutaneous, q24h  gabapentin, 300 mg, oral, TID      Continuous medications     PRN medications  PRN medications: acetaminophen, hydrALAZINE **FOLLOWED BY** hydrALAZINE, labetaloL, oxygen     Objective    Physical Exam  Constitutional:       General: She is not in acute distress.     Appearance: Normal appearance. She is normal weight. She is not ill-appearing.   HENT:      Head: Normocephalic and atraumatic.      Right Ear: External ear normal.      Left Ear: External ear normal.      Nose: Nose normal.   Eyes:      Extraocular Movements: Extraocular movements intact.      Conjunctiva/sclera: Conjunctivae normal.   Cardiovascular:      Rate and Rhythm: Normal rate and regular rhythm.      Pulses: Normal  "pulses.      Heart sounds: Normal heart sounds.   Pulmonary:      Effort: Pulmonary effort is normal. No respiratory distress.      Breath sounds: No stridor. No wheezing or rhonchi.   Abdominal:      General: Abdomen is flat. Bowel sounds are normal. There is no distension.      Palpations: Abdomen is soft.      Tenderness: There is no abdominal tenderness. There is no guarding or rebound.   Musculoskeletal:      Cervical back: Normal range of motion. No rigidity or tenderness.      Right lower leg: No edema.      Left lower leg: No edema.   Skin:     General: Skin is warm and dry.   Neurological:      General: No focal deficit present.      Mental Status: She is oriented to person, place, and time.      Cranial Nerves: No cranial nerve deficit.      Sensory: No sensory deficit.        Visit Vitals  /64 (BP Location: Left arm, Patient Position: Lying)   Pulse 91   Temp 36.4 °C (97.5 °F) (Temporal)   Resp 18   Ht 1.651 m (5' 5\")   Wt 69.9 kg (154 lb 1.6 oz)   SpO2 98%   BMI 25.64 kg/m²   OB Status Postmenopausal   BSA 1.79 m²        Intake/Output Summary (Last 24 hours) at 7/5/2024 1158  Last data filed at 7/5/2024 1022  Gross per 24 hour   Intake 2535 ml   Output 1900 ml   Net 635 ml       Labs:   Results from last 72 hours   Lab Units 07/05/24  0640 07/04/24  2338 07/04/24  1749   SODIUM mmol/L 145 138 134*   POTASSIUM mmol/L 3.9 4.0 4.0   CHLORIDE mmol/L 108* 103 101   CO2 mmol/L 30 30 28   BUN mg/dL 13 17 14   CREATININE mg/dL 0.79 0.88 0.76   GLUCOSE mg/dL 109* 115* 120*   CALCIUM mg/dL 9.6 9.3 9.0   ANION GAP mmol/L 11 9* 9*   EGFR mL/min/1.73m*2 74 65 77   PHOSPHORUS mg/dL 3.2 3.5 3.1      Results from last 72 hours   Lab Units 07/05/24  0639 07/04/24  0551 07/03/24  1040   WBC AUTO x10*3/uL 9.2 10.3 10.3   HEMOGLOBIN g/dL 13.6 11.5* 12.5   HEMATOCRIT % 42.2 33.5* 36.7   PLATELETS AUTO x10*3/uL 235 186 210   NEUTROS PCT AUTO %  --   --  81.3   LYMPHS PCT AUTO %  --   --  8.4   MONOS PCT AUTO %  --   --  " "9.5   EOS PCT AUTO %  --   --  0.1      Lab Results   Component Value Date    CALCIUM 9.6 07/05/2024    PHOS 3.2 07/05/2024      No results found for: \"CRP\"    [unfilled]     Micro/ID:   Susceptibility data from last 90 days.  Collected Specimen Info Organism Ampicillin Cefazolin Cefazolin (uncomplicated UTIs only) Ciprofloxacin Gentamicin Nitrofurantoin Piperacillin/Tazobactam Tobramycin Trimethoprim/Sulfamethoxazole   06/24/24 Urine from Clean Catch/Voided Escherichia coli  S  S  S  S  R  S  S  R  S                    No lab exists for component: \"AGALPCRNB\"     Lab Results   Component Value Date    URINECULTURE (A) 07/03/2024     Multiple organisms present, probable contamination. Repeat culture if clinically indicated.       Images    CT angio head and neck w and wo IV contrast    Result Date: 7/3/2024  Interpreted By:  Rupesh Del Rsoario, STUDY: CT ANGIO HEAD AND NECK W AND WO IV CONTRAST;  7/3/2024 6:46 pm   INDICATION: Signs/Symptoms:Stroke rule out   COMPARISON: 07/03/2024 CT head without contrast, 07/03/2024 MRI brain   ACCESSION NUMBER(S): [id number]   ORDERING CLINICIAN: MARÍA DREW   TECHNIQUE: Multiple contiguous axial noncontrast images of the head were obtained. Following IV contrast administration of iodinated contrast, a CT angiography of the head and neck was performed. MIPS and 3D reconstructions of the [place] and neck were created on an independent workstation and reviewed.   FINDINGS: CTA NECK:       LEFT VERTEBRAL ARTERY:  No hemodynamically significant stenosis, occlusion, or dissection.   LEFT COMMON/INTERNAL CAROTID ARTERY: Minimal calcific atherosclerosis at the bulb. No hemodynamically significant stenosis, occlusion, or dissection.   RIGHT VERTEBRAL ARTERY: Non dominant. No hemodynamically significant stenosis, occlusion, or dissection.   RIGHT COMMON/INTERNAL CAROTID ARTERY: Mild calcified atherosclerosis of the distal CCA and carotid bulb. No hemodynamically " significant stenosis, occlusion, or dissection.     The neck soft tissues show no evidence of mass, fluid collection, or enlarged lymph nodes. Multinodular goiter. There is no acute osseous abnormality. Multilevel degenerative changes of the cervical spine with moderate canal stenosis at C5-C6 and mild canal stenosis at C6-C7 due to disc spur complexes. There is also multilevel foraminal stenosis that is notably severe bilaterally at C3-C4, moderate-severe bilaterally at C4-C5, severe bilaterally at C5-C6, and moderate on the left at C6-C7.       CTA HEAD:   ANTERIOR CIRCULATION: No aneurysm.   - Internal Carotid Arteries: Mild calcific atherosclerosis bilaterally. No hemodynamically significant stenosis or occlusion.   - Middle Cerebral Arteries:  No hemodynamically significant stenosis or occlusion.   - Anterior Cerebral Arteries: There are few areas of severe focal stenosis of the left pericallosal anterior cerebral artery. There is a single area of mild stenosis of the right pericallosal anterior cerebral artery.     POSTERIOR CIRCULATION: No aneurysm.   - Intracranial Vertebral Arteries: Minimal calcific atherosclerosis bilaterally. No hemodynamically significant stenosis or occlusion. The PICA are patent bilaterally.   - Basilar Artery:  No hemodynamically significant stenosis or occlusion.   - Posterior Cerebral Arteries: There is a focal severe stenosis of the right calcarine artery. Otherwise, no hemodynamically significant stenosis or occlusion.   No arteriovenous malformation is visualized. No pathologic intracranial enhancement or discrete mass. The dural venous sinuses are patent.   MIPS and 3D reconstructions confirm the above findings.       1. No large vessel intracranial or extracranial occlusion. 2. No hemodynamically significant stenosis in the neck. 3. 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 Several areas of severe focal stenosis in the left pericallosal anterior cerebral artery. 4.  Multilevel cervical spondylosis resulting in high-grade foraminal stenosis at most levels as detailed above, and moderate canal stenosis at C5-C6.       MACRO: None.   Signed by: Rupesh Del Rosario 7/3/2024 7:18 PM Dictation workstation:   [id number]    MR brain wo IV contrast    Result Date: 7/3/2024  Interpreted By:  Arnol Gonzales, STUDY: MR BRAIN WO IV CONTRAST; 7/3/2024 5:15 pm   INDICATION: Signs/Symptoms:Stroke like symptoms;   COMPARISON: CT brain 07/30/2024   ACCESSION NUMBER(S): [id number]   ORDERING CLINICIAN: MARÍA DREW   TECHNIQUE: Routine brain MRI protocol without  contrast including diffusion and gradient echo images. Incomplete acquired sequences due to patient's inability to remain motion less.   FINDINGS: RESULT:   Examination is severely degraded by motion. Incomplete acquisition of sequences due to patient's inability to remain immobile. DWI images within the limits of evaluation, demonstrates no acute abnormality.   For aspirin echo T2 with a sequence, demonstrate no acute intracranial abnormality. Patchy and confluent foci of hyperintense signal on T2 weighted and FLAIR images are noted involving the supratentorial white matter are nonspecific but likely represent moderate microvascular ischemia. Sequela of multiple prior supratentorial infarcts. Moderate generalized volume loss with concordant ventriculomegaly.       Limited examination due to incomplete sequences. Within the limits of evaluation as described above, no acute intracranial abnormality.   Signed by: Arnol Gonzales 7/3/2024 5:33 PM Dictation workstation:   [id number]    Transthoracic Echo (TTE) Complete    Result Date: 7/3/2024   [facility], [address] [place] [address]               Tel [phone] and Fax [phone] TRANSTHORACIC ECHOCARDIOGRAM REPORT  Patient Name:      RYAN MCCABE   Reading Physician:    Jaiden Cruz                                                               Pilar CRYSTAL Study Date:        7/3/2024            Ordering Provider:    [id number] MARÍA MELONIE ALEXANDERSallyYVETTE MRN/PID:           [id number]            Fellow: Accession#:        [id number]        Nurse:                Elissa Kidd RN Date of Birth/Age: 1940 / 84      Sonographer:          Lumavalerie Jallohbridgette leblanc                                     RDCS Gender:            F                   Additional Staff: Height:            165.10 cm           Admit Date:           7/3/2024 Weight:            69.85 kg            Admission Status:     Observation -                                                              Routine BSA / BMI:         1.77 m2 / 25.63     Encounter#:           [id number]                    kg/m2 Blood Pressure:    134/82 mmHg         Department Location:  [facility] Non                                                              Invasive Study Type:    TRANSTHORACIC ECHO (TTE) COMPLETE Diagnosis/ICD: Syncope and collapse-R55 Indication:    Syncope, Falls CPT Code:      Echo Complete w Full [id number] Patient History: Pertinent History: TIA and Falls. Study Detail: The following Echo studies were performed: 2D, M-Mode, Doppler and               color flow. Technically challenging study due to Pt movement               throughout study. Agitated saline used as a contrast agent for               intraseptal flow evaluation. The patient was awake.  PHYSICIAN INTERPRETATION: Left Ventricle: The left ventricular systolic function is low normal, with a visually estimated ejection fraction of 50-55%. The left ventricular cavity size is normal. Spectral Doppler shows an impaired relaxation pattern of left ventricular diastolic filling. Left Atrium: The left atrium is normal in size. A bubble study using agitated saline was performed. Bubble study is negative. Right  Ventricle: The right ventricle is normal in size. There is normal right ventricular global systolic function. Right Atrium: The right atrium is normal in size. Aortic Valve: The aortic valve was not well visualized. There is no evidence of aortic valve stenosis. The aortic valve dimensionless index is 0.51. There is no evidence of aortic valve regurgitation. The peak instantaneous gradient of the aortic valve is 9.8 mmHg. The mean gradient of the aortic valve is 5.4 mmHg. Mitral Valve: The mitral valve is mild to moderately thickened. There is mild mitral valve regurgitation. Tricuspid Valve: The tricuspid valve was not well visualized. There is trace to mild tricuspid regurgitation. The right ventricular systolic pressure is unable to be estimated. Pulmonic Valve: The pulmonic valve is not well visualized. There is physiologic pulmonic valve regurgitation. Pericardium: There is no pericardial effusion noted. There is a pericardial fat pad present. Aorta: The aortic root was not well visualized. Systemic Veins: The inferior vena cava size appears small. There is IVC inspiratory collapse greater than 50%. In comparison to the previous echocardiogram(s): There are no prior studies on this patient for comparison purposes.  CONCLUSIONS:  1. The left ventricular systolic function is low normal, with a visually estimated ejection fraction of 50-55%.  2. Spectral Doppler shows an impaired relaxation pattern of left ventricular diastolic filling.  3. There is normal right ventricular global systolic function.  4. Aortic valve stenosis is not present. QUANTITATIVE DATA SUMMARY: 2D MEASUREMENTS:                          Normal Ranges: IVSd:          1.13 cm   (0.6-1.1cm) LVPWd:         0.56 cm   (0.6-1.1cm) LVIDd:         5.00 cm   (3.9-5.9cm) LVIDs:         3.58 cm LV Mass Index: 82.2 g/m2 LV % FS        28.4 % LA VOLUME:                               Normal Ranges: LA Vol A4C:        23.3 ml    (22+/-6mL/m2) LA Vol A2C:         43.0 ml LA Vol BP:         31.6 ml LA Vol Index A4C:  13.1 ml/m2 LA Vol Index A2C:  24.3 ml/m2 LA Vol Index BP:   17.9 ml/m2 LA Area A4C:       11.7 cm2 LA Area A2C:       15.9 cm2 LA Major Axis A4C: 5.0 cm LA Major Axis A2C: 5.0 cm LA Volume Index:   17.7 ml/m2 LA Vol A4C:        20.4 ml LA Vol A2C:        40.5 ml RA VOLUME BY A/L METHOD:                       Normal Ranges: RA Area A4C: 10.6 cm2 LV SYSTOLIC FUNCTION BY 2D PLANIMETRY (MOD):                      Normal Ranges: EF-A4C View:    47 % (>=55%) EF-A2C View:    39 % EF-Biplane:     42 % EF-Visual:      53 % LV EF Reported: 53 % LV DIASTOLIC FUNCTION:                          Normal Ranges: MV Peak E:    0.67 m/s   (0.7-1.2 m/s) MV Peak A:    0.98 m/s   (0.42-0.7 m/s) E/A Ratio:    0.68       (1.0-2.2) MV e'         0.065 m/s  (>8.0) MV lateral e' 0.07 m/s MV medial e'  0.06 m/s MV A Dur:     77.07 msec E/e' Ratio:   10.27      (<8.0) MITRAL VALVE:                 Normal Ranges: MV DT: 148 msec (150-240msec) AORTIC VALVE:                                   Normal Ranges: AoV Vmax:                1.57 m/s (<=1.7m/s) AoV Peak P.8 mmHg (<20mmHg) AoV Mean P.4 mmHg (1.7-11.5mmHg) LVOT Max Tom:            0.93 m/s (<=1.1m/s) AoV VTI:                 31.68 cm (18-25cm) LVOT VTI:                16.08 cm LVOT Diameter:           1.90 cm  (1.8-2.4cm) AoV Area, VTI:           1.44 cm2 (2.5-5.5cm2) AoV Area,Vmax:           1.69 cm2 (2.5-4.5cm2) AoV Dimensionless Index: 0.51  RIGHT VENTRICLE: RV Basal 2.50 cm RV Mid   1.50 cm RV Major 7.5 cm RV s'    0.13 m/s TRICUSPID VALVE/RVSP:                            Normal Ranges: Peak TR Velocity: 0.99 m/s RV Syst Pressure: 6.9 mmHg (< 30mmHg) IVC Diam:         0.90 cm PULMONIC VALVE:                      Normal Ranges: PV Max Tom: 0.7 m/s  (0.6-0.9m/s) PV Max P.9 mmHg AORTA: Asc Ao Diam 4.00 cm  [id number] Nancy Quezada MD Electronically signed on 7/3/2024 at 3:50:57 PM  ** Final **     ECG 12  lead    Result Date: 7/3/2024  Sinus rhythm with occasional Premature ventricular complexes Possible Left atrial enlargement Anterior infarct (cited on or before 24-JUN-2024) Abnormal ECG When compared with ECG of 24-JUN-2024 15:25, Premature ventricular complexes are now Present QRS axis Shifted left    XR humerus left    Result Date: 7/3/2024  Interpreted By:  Katie Peralta, STUDY: XR HUMERUS LEFT; ;  7/3/2024 12:17 pm   INDICATION: Signs/Symptoms:L arm injury.   COMPARISON: None.   ACCESSION NUMBER(S): [id number]   ORDERING CLINICIAN: ERON BURGESS   FINDINGS: No acute fracture or dislocation. Calcific tendinosis of the rotator cuff.       No acute osseous abnormality.   Signed by: Katie Peralta 7/3/2024 12:32 PM Dictation workstation:   [id number]    CT head wo IV contrast    Result Date: 7/3/2024  Interpreted By:  Samantha Merritt, STUDY: CT HEAD WO IV CONTRAST;  7/3/2024 11:21 am   INDICATION: Signs/Symptoms:fall head injury.   COMPARISON: 06/24/2024   ACCESSION NUMBER(S): [id number]   ORDERING CLINICIAN: ERON BURGESS   TECHNIQUE: Examination was performed in the axial plane using soft tissue and bone algorithm.   FINDINGS: INTRACRANIAL: There is prominence of the ventricular system and cerebral sulci consistent with cerebral atrophy. There are periventricular hypodensities consistent with  marked small vessel disease. No mass or mass effect is identified. There is no hemorrhage or subdural fluid collection. There is no acute infarct. There are small remote bilateral thalamic infarcts, seen previously. There is no fracture of the calvarium.   EXTRACRANIAL: Visualized paranasal sinuses and mastoids are clear.       No acute intracranial pathology.   MACRO: None   Signed by: Samantha Merritt 7/3/2024 11:45 AM Dictation workstation:   [id number]    ECG 12 lead    Result Date: 6/28/2024  Normal sinus rhythm Possible Anterior infarct , age undetermined Abnormal ECG When compared with ECG of 17-JAN-2012  08:10, Borderline criteria for Anterior infarct are now Present Nonspecific T wave abnormality now evident in Anterior leads See ED provider note for full interpretation and clinical correlation Confirmed by Mariya Carter ([id number]) on 6/28/2024 2:08:38 PM    CT head wo IV contrast    Result Date: 6/24/2024  Interpreted By:  Syed Carlin, STUDY: CT HEAD WO IV CONTRAST;  6/24/2024 3:50 pm   INDICATION: Signs/Symptoms:TIA earlier in the week.   COMPARISON: None.   ACCESSION NUMBER(S): [id number]   ORDERING CLINICIAN: AUBREY CARIAS   TECHNIQUE: Noncontrast axial CT scan of head was performed. Angled reformats in brain and bone windows were generated. The images were reviewed in bone, brain, blood and soft tissue windows.   FINDINGS: There is no intra/extra-axial fluid collection, mass effect, or midline shift. The gray/white matter junction is preserved. Hypoattenuation of periventricular and subcortical white matter suggestive of chronic small vessel ischemic disease. Tiny old lacunar infarct is noted in the centrum semiovale abutting the body of the left lateral ventricle. Minimal diffuse parenchymal volume loss is seen the basal cisterns are patent. Visualized paranasal sinuses and mastoid air cells are clear. The calvarium is intact.       No acute intracranial finding. MRI may be obtained if clinically indicated.   Chronic nonemergent findings as above   Signed by: Syed Carlin 6/24/2024 4:16 PM Dictation workstation:   [id number]      Assessment and Plan    Sol Gomez is a 84 y.o. female admitted on 7/3/2024 with a pmh of multiple CVAs, Diabetes Insipidus, carpal tunnel syndrome, and HTN who presented to the hospital for multiple falls over the past 3 days and a burning sensation in her left upper extremity.       ACUTE MEDICAL ISSUES:  # Acute Metabolic Encephalopathy   2/2 UTI vs Hyponatremia   - Pt no longer requiring soft wrist restraints, discontinued  - Patient completed a previous course of  Cephalexin and 2 days of IV Rocephin, will discontinue Rocephin      #Reoccurring PVCs with tachycardia  #Syncopal Episode  #Falls  - Patient experienced has been experiencing frequent falls with a syncopal episode 3 days prior to admission  - Patient continues to have reoccurring PVCs on EKG with HR ranging from 108-148  - Echo 7/3/24 showed EF 50-55%  Plan:  - Cardiology consulted and recommended 25 mg metoprolol tartrate BID and Zio patch and EP study after discharge  - Metoprolol Tartrate 25 mg BID    # C/f Autoimmune Encephalitis   - Pt presenting with L UE irregular movements   - CT head wo con showed chronic L basal ganglia lacunar stroke   - Neuro consulted, appreciate recs   - Pt refused Aspirin 81mg d/t epistaxis   - Atorvastatin 80 mg daily   - MRI brain wo contrast limited d/t movement   - MRA head/neck showed focal severe stenosis in right calcarine & left pericallosal ant cerebral arteries, focal high-grade cervical foraminal stenosis   Plan:  - Neurology Consulted, appreciate reccs  - Anti NMDAR, GABAa, GABAb, AMPA and glycine receptors, AQP4, voltage-gated potassium channels ordered --> If (+) may need to start IVIG   - Continue Gabapentin 300mg TID   - PT/OT      # Symptomatic Hyponatremia  # Hx Diabetes Insipidus  # Likely SIADH  - Na 125 on admission (baseline wnl)    - Could be d/t desmopressin use for DI  - Urine electrolytes consistent with SIADH  - Na increased to 145, Urinary output>2L overnight  Plan:  - Nephrology consulted, urine electrolytes consisted with SIADH, recommended discontinuing fluid restriction and I.V fluids and decreasing dose of Desmopressin to 0.05 mg BID, appreciate reccs  - Hold home Desmopressin 0.1mg BID  - Initiate Desmopressin 0.05 mg BID  - Trend RFP every 6 hrs   - Recommend outpatient follow-up with Nephrology     ADDRESSED/ RESOLVED MEDICAL ISSUES:  # UTI  - Patient UA showed indicated trace LE and blood  - Was discharged from [facility] on 6/24 for UTI. Culture  at the time indicated E.Coli, and patient received a course of Cephelexin   - Urine Culture (6/24/24): E. Coli   - Patient completed a previous course of Cephalexin and 2 days of IV Rocephin, will discontinue Rocephin      CHRONIC MEDICAL ISSUES:  #Carpal Tunnel Syndrome-Pain Medication PRN  #HTN-Held Amlodipine for CVA workup. Hydralazine and Labatelol PRN for SBP>220      Fluids: None  Electrolytes: Na wnl, monitor for hypernatremia  Nutrition: Adult regular diet  Antimicrobials: None  DVT ppx: Lovenox  GI ppx: None  Lines: PIV  Supplemental Oxygen:   Emergency Contact: Extended Emergency Contact Information  Primary Emergency Contact: Mellissa Vee  Home Phone: [phone]  Relation: Child   Code: Full Code     Disposition: PT/OT eval required prior to discharge. Autoimmune workup pending, may be followed up outpatient.      Bismark Guerrero,   Internal Medicine, PGY- 1  07/05/24 at 11:58 AM

## 2024-07-06 ENCOUNTER — APPOINTMENT (OUTPATIENT)
Dept: CARDIOLOGY | Facility: HOSPITAL | Age: 84
DRG: 643 | End: 2024-07-06
Payer: MEDICARE

## 2024-07-06 LAB
ALBUMIN SERPL BCP-MCNC: 3.8 G/DL (ref 3.4–5)
ANION GAP SERPL CALC-SCNC: 15 MMOL/L (ref 10–20)
BUN SERPL-MCNC: 13 MG/DL (ref 6–23)
CALCIUM SERPL-MCNC: 8.9 MG/DL (ref 8.6–10.3)
CHLORIDE SERPL-SCNC: 109 MMOL/L (ref 98–107)
CO2 SERPL-SCNC: 24 MMOL/L (ref 21–32)
CREAT SERPL-MCNC: 0.7 MG/DL (ref 0.5–1.05)
EGFRCR SERPLBLD CKD-EPI 2021: 85 ML/MIN/1.73M*2
ERYTHROCYTE [DISTWIDTH] IN BLOOD BY AUTOMATED COUNT: 13.8 % (ref 11.5–14.5)
GLUCOSE BLD MANUAL STRIP-MCNC: 100 MG/DL (ref 74–99)
GLUCOSE BLD MANUAL STRIP-MCNC: 103 MG/DL (ref 74–99)
GLUCOSE BLD MANUAL STRIP-MCNC: 97 MG/DL (ref 74–99)
GLUCOSE SERPL-MCNC: 93 MG/DL (ref 74–99)
HCT VFR BLD AUTO: 41.2 % (ref 36–46)
HGB BLD-MCNC: 12.7 G/DL (ref 12–16)
MAGNESIUM SERPL-MCNC: 2.19 MG/DL (ref 1.6–2.4)
MCH RBC QN AUTO: 28.9 PG (ref 26–34)
MCHC RBC AUTO-ENTMCNC: 30.8 G/DL (ref 32–36)
MCV RBC AUTO: 94 FL (ref 80–100)
NRBC BLD-RTO: 0 /100 WBCS (ref 0–0)
PHOSPHATE SERPL-MCNC: 3.6 MG/DL (ref 2.5–4.9)
PLATELET # BLD AUTO: 212 X10*3/UL (ref 150–450)
POTASSIUM SERPL-SCNC: 4.3 MMOL/L (ref 3.5–5.3)
RBC # BLD AUTO: 4.4 X10*6/UL (ref 4–5.2)
SODIUM SERPL-SCNC: 144 MMOL/L (ref 136–145)
WBC # BLD AUTO: 12 X10*3/UL (ref 4.4–11.3)

## 2024-07-06 PROCEDURE — 1200000002 HC GENERAL ROOM WITH TELEMETRY DAILY

## 2024-07-06 PROCEDURE — 97165 OT EVAL LOW COMPLEX 30 MIN: CPT | Mod: GO | Performed by: OCCUPATIONAL THERAPIST

## 2024-07-06 PROCEDURE — 36415 COLL VENOUS BLD VENIPUNCTURE: CPT

## 2024-07-06 PROCEDURE — 80069 RENAL FUNCTION PANEL: CPT

## 2024-07-06 PROCEDURE — 2500000004 HC RX 250 GENERAL PHARMACY W/ HCPCS (ALT 636 FOR OP/ED)

## 2024-07-06 PROCEDURE — 97530 THERAPEUTIC ACTIVITIES: CPT | Mod: GP

## 2024-07-06 PROCEDURE — 83735 ASSAY OF MAGNESIUM: CPT

## 2024-07-06 PROCEDURE — 2500000001 HC RX 250 WO HCPCS SELF ADMINISTERED DRUGS (ALT 637 FOR MEDICARE OP)

## 2024-07-06 PROCEDURE — 93005 ELECTROCARDIOGRAM TRACING: CPT

## 2024-07-06 PROCEDURE — 97161 PT EVAL LOW COMPLEX 20 MIN: CPT | Mod: GP

## 2024-07-06 PROCEDURE — 97535 SELF CARE MNGMENT TRAINING: CPT | Mod: GO | Performed by: OCCUPATIONAL THERAPIST

## 2024-07-06 PROCEDURE — 85027 COMPLETE CBC AUTOMATED: CPT

## 2024-07-06 PROCEDURE — 94760 N-INVAS EAR/PLS OXIMETRY 1: CPT

## 2024-07-06 PROCEDURE — 99232 SBSQ HOSP IP/OBS MODERATE 35: CPT | Performed by: INTERNAL MEDICINE

## 2024-07-06 PROCEDURE — 82947 ASSAY GLUCOSE BLOOD QUANT: CPT

## 2024-07-06 RX ORDER — SENNOSIDES 8.6 MG/1
1 TABLET ORAL 2 TIMES DAILY
Status: DISCONTINUED | OUTPATIENT
Start: 2024-07-06 | End: 2024-07-08 | Stop reason: HOSPADM

## 2024-07-06 RX ORDER — POLYETHYLENE GLYCOL 3350 17 G/17G
17 POWDER, FOR SOLUTION ORAL DAILY PRN
Status: DISCONTINUED | OUTPATIENT
Start: 2024-07-06 | End: 2024-07-08 | Stop reason: HOSPADM

## 2024-07-06 ASSESSMENT — COGNITIVE AND FUNCTIONAL STATUS - GENERAL
EATING MEALS: A LITTLE
STANDING UP FROM CHAIR USING ARMS: A LITTLE
WALKING IN HOSPITAL ROOM: A LITTLE
CLIMB 3 TO 5 STEPS WITH RAILING: TOTAL
DAILY ACTIVITIY SCORE: 18
HELP NEEDED FOR BATHING: A LOT
DAILY ACTIVITIY SCORE: 17
CLIMB 3 TO 5 STEPS WITH RAILING: A LOT
TURNING FROM BACK TO SIDE WHILE IN FLAT BAD: A LITTLE
DRESSING REGULAR LOWER BODY CLOTHING: A LITTLE
TURNING FROM BACK TO SIDE WHILE IN FLAT BAD: A LITTLE
HELP NEEDED FOR BATHING: A LITTLE
DRESSING REGULAR UPPER BODY CLOTHING: A LITTLE
STANDING UP FROM CHAIR USING ARMS: A LOT
TOILETING: A LOT
MOVING FROM LYING ON BACK TO SITTING ON SIDE OF FLAT BED WITH BEDRAILS: A LITTLE
TOILETING: A LOT
MOBILITY SCORE: 17
WALKING IN HOSPITAL ROOM: A LOT
MOVING TO AND FROM BED TO CHAIR: A LOT
DRESSING REGULAR LOWER BODY CLOTHING: A LITTLE
PERSONAL GROOMING: A LITTLE
DRESSING REGULAR UPPER BODY CLOTHING: A LITTLE
MOBILITY SCORE: 14
MOVING TO AND FROM BED TO CHAIR: A LITTLE

## 2024-07-06 ASSESSMENT — ACTIVITIES OF DAILY LIVING (ADL)
BATHING_ASSISTANCE: MODERATE
HOME_MANAGEMENT_TIME_ENTRY: 12

## 2024-07-06 ASSESSMENT — PAIN SCALES - GENERAL
PAINLEVEL_OUTOF10: 3
PAINLEVEL_OUTOF10: 4
PAINLEVEL_OUTOF10: 3
PAINLEVEL_OUTOF10: 4
PAINLEVEL_OUTOF10: 2

## 2024-07-06 ASSESSMENT — PAIN - FUNCTIONAL ASSESSMENT
PAIN_FUNCTIONAL_ASSESSMENT: 0-10

## 2024-07-06 ASSESSMENT — PAIN SCALES - WONG BAKER: WONGBAKER_NUMERICALRESPONSE: HURTS EVEN MORE

## 2024-07-06 ASSESSMENT — PAIN DESCRIPTION - LOCATION
LOCATION: HAND
LOCATION: HAND

## 2024-07-06 ASSESSMENT — PAIN DESCRIPTION - ORIENTATION
ORIENTATION: LEFT
ORIENTATION: LEFT

## 2024-07-06 NOTE — PROGRESS NOTES
Occupational Therapy    Evaluation    Patient Name: Slo Gomez  MRN: [id number]  Today's Date: 7/6/2024  Time Calculation  Start Time: 0827  Stop Time: 0859  Time Calculation (min): 32 min    Assessment  IP OT Assessment  OT Assessment: Pt demonstrates improved cognition and ability to participate in therapeutic activity however demonstrates decreased activity tolerance, decreased balance, decreased ADL performance and decreased functional mobility. Pt lives alone and is a falls risk at this time.  Prognosis: Good  Evaluation/Treatment Tolerance: Patient tolerated treatment well  Medical Staff Made Aware: Yes  End of Session Communication: Bedside nurse  End of Session Patient Position: Up in chair, Alarm on  Plan:  Treatment Interventions: ADL retraining, UE strengthening/ROM, Endurance training, Patient/family training, Equipment evaluation/education, Compensatory technique education  OT Frequency: 3 times per week  OT Discharge Recommendations: Moderate intensity level of continued care  OT Recommended Transfer Status: Assist of 2  OT - OK to Discharge: Yes    Subjective   Current Problem:  1. Hyponatremia        2. Frequent falls  Transthoracic Echo (TTE) Complete    Transthoracic Echo (TTE) Complete      3. Syncope and collapse  Transthoracic Echo (TTE) Complete    Transthoracic Echo (TTE) Complete        General:  General  Reason for Referral: OT for ADL assessment  Past Medical History Relevant to Rehab: Pt admitted 7/3 with weakness and falls and acute metabolic encephalopathy. Attempts to eval unsuccessful due to pt in restaints and increased agitation. PMH: multiple CVAs, DM, CTS, HTN, TIA (most recent 3 weeks ago)  Missed Visit: No  Missed Visit Reason:  (OT orders acknowleged; communication with nursing via secure chat following chart review. Pt confused and currently in restraints for pt safety.  Pt not appropriate to particiate in functional assessment.  Eval not complete.)  Family/Caregiver  Present: No  Co-Treatment: PT  Co-Treatment Reason: To maximize pt participation and safety  Prior to Session Communication: Bedside nurse  Patient Position Received: Bed, 3 rail up, Alarm on  Preferred Learning Style: verbal, visual  General Comment: Pt observed eating her breakfast with HOB elevated. Coughing noted to clear throat while eating. Pt educated on reason for OT consultation and acute services. Agreeable to participate.  Precautions:  Hearing/Visual Limitations: Hearing impaired; pt with bilateral hearing aides with charging case however, unable to charge.  UE Weight Bearing Status: Weight Bearing as Tolerated  LE Weight Bearing Status: Weight Bearing as Tolerated  Medical Precautions: Fall precautions  Vital Signs:     Pain:  Pain Assessment  Pain Assessment: 0-10  0-10 (Numeric) Pain Score: 4  Pain Type: Chronic pain  Pain Location: Hand  Pain Orientation: Left  Effect of Pain on Daily Activities: pt modifies and limits use of L hand; reports chronic    Objective   Cognition:  Overall Cognitive Status: Within Functional Limits  Orientation Level: Oriented X4  Attention: Within Functional Limits  Memory: Within Funtional Limits  Problem Solving: Within Functional Limits  Safety/Judgement:  (mild impairment)     Confusion Assessment Method (CAM)  Acute Onset and Fluctuating Course (1A):  (pt appears back to baseline)  [place] Agitation Sedation Scale  [place] Agitation Sedation Scale (RASS): Alert and calm  Home Living:  Type of Home: House  Lives With: Alone  Home Adaptive Equipment:  (rollator)  Home Layout: Able to live on main level with bedroom/bathroom  Home Access: Stairs to enter with rails  Entrance Stairs-Rails: Both  Entrance Stairs-Number of Steps: 5  Bathroom Shower/Tub: Walk-in shower  Bathroom Toilet: Adaptive toilet seating  Bathroom Equipment: Hand-held shower hose, Grab bars in shower   Prior Function:  Level of [place]: Independent with ADLs and functional transfers,  Independent with homemaking with ambulation  Receives Help From: Family  Hand Dominance: Left  IADL History:  Current License: Yes  Mode of Transportation: Car  ADL:  Eating Assistance: Independent  Grooming Assistance: Independent  Bathing Assistance: Moderate  UE Dressing Assistance: Independent  LE Dressing Assistance: Minimal  Toileting Assistance with Device: Moderate  Functional Assistance: Moderate  Functional Deficit: Steadying, Verbal cueing, Supervision/safety  ADL Comments: Pt seems to require more assistance than reported baseline to maintain balance during tasks; decreased standing tolerance  Activity Tolerance:  Endurance: Tolerates 10 - 20 min exercise with multiple rests  Bed Mobility/Transfers: Bed Mobility  Bed Mobility: Yes  Bed Mobility 1  Bed Mobility 1: Supine to sitting  Level of Assistance 1: Minimum assistance  Bed Mobility Comments 1: verbal cues for hand placement    Transfers  Transfer: Yes  Transfer 1  Transfer From 1: Sit to, Bed to  Transfer to 1: Stand  Technique 1: Sit to stand  Transfer Device 1: Walker  Transfer Level of Assistance 1: Moderate assistance  Transfers 2  Transfer From 2: Stand to  Transfer to 2: Sit, Bed, Chair with arms  Technique 2: Stand to sit  Transfer Device 2: Walker  Transfer Level of Assistance 2: Moderate assistance      Functional Mobility:  Functional Mobility  Functional Mobility Performed: Yes  Functional Mobility 1  Surface 1: Level tile  Device 1: Rolling walker  Assistance 1: Minimum assistance (x2 with walker)  Comments 1: pt side stepping along bed L<>right; pt taking steps away from bed and back  Sitting Balance:  Static Sitting Balance  Static Sitting-Balance Support: Feet supported  Static Sitting-Level of Assistance: Independent  Dynamic Sitting Balance  Dynamic Sitting-Balance Support: Feet supported  Dynamic Sitting-Comments: CGA  Standing Balance:  Static Standing Balance  Static Standing-Balance Support: Bilateral upper extremity  supported  Static Standing-Level of Assistance: Moderate assistance  Static Standing-Comment/Number of Minutes: x2 at times  Dynamic Standing Balance  Dynamic Standing-Balance Support: Bilateral upper extremity supported  Dynamic Standing-Balance: Turning  Dynamic Standing-Comments: mod A x 2  Modalities:  Modalities Used: No  IADL's:   Current License: Yes  Mode of Transportation: Car  Vision: Vision - Basic Assessment  Current Vision: No visual deficits  Patient Visual Report:  (no deficits reported; reading glasses observed on pt table)  Sensation:  Sensation Comment: pt reports chronic numbness bilateral hands (L>R)  Strength:  Strength Comments: WFL  Perception:     Coordination:  Movements are Fluid and Coordinated: Yes   Hand Function:  Hand Function  Gross Grasp: Functional  Coordination: Functional  Extremities: RUE   RUE : Within Functional Limits and LUE   LUE: Within Functional Limits      Outcome Measures: [facility] Daily Activity  Putting on and taking off regular lower body clothing: A little  Bathing (including washing, rinsing, drying): A lot  Putting on and taking off regular upper body clothing: A little  Toileting, which includes using toilet, bedpan or urinal: A lot  Taking care of personal grooming such as brushing teeth: None  Eating Meals: None  Daily Activity - Total Score: 18      Education Documentation  Home Exercise Program, taught by Arti Yung OT at 7/6/2024 11:32 AM.  Learner: Patient  Readiness: Acceptance  Method: Explanation  Response: Verbalizes Understanding    ADL Training, taught by Arti Yung OT at 7/6/2024 11:32 AM.  Learner: Patient  Readiness: Acceptance  Method: Explanation  Response: Verbalizes Understanding    Education Comments  No comments found.      Goals:   Encounter Problems       Encounter Problems (Active)       ADLs       Patient with complete upper body dressing with independent level of assistance donning and doffing all UE clothes with no adaptive  equipment while edge of bed  and standing       Start:  07/06/24    Expected End:  07/20/24            Patient with complete lower body dressing with modified independent level of assistance donning and doffing all LE clothes  with PRN adaptive equipment while edge of bed  and standing       Start:  07/06/24    Expected End:  07/20/24            Patient will complete daily grooming tasks brushing teeth and washing face/hair with independent level of assistance and PRN adaptive equipment while standing.       Start:  07/06/24    Expected End:  07/20/24            Patient will complete toileting including hygiene clothing management/hygiene with independent level of assistance and raised toilet seat and grab bars.       Start:  07/06/24    Expected End:  07/20/24               BALANCE       Pt will maintain dynamic standing balance during ADL task with independent level of assistance in order to demonstrate decreased risk of falling and improved postural control.       Start:  07/06/24    Expected End:  07/20/24               EXERCISE/STRENGTHENING       Patient will be educated on BUE HEP for increased ADL performance.       Start:  07/06/24    Expected End:  07/20/24               MOBILITY       Patient will perform Functional mobility no external Household distances/Community Distances with independent level of assistance and least restrictive device in order to improve safety and functional mobility.       Start:  07/06/24    Expected End:  07/20/24               TRANSFERS       Patient will complete functional transfer to all surfaces with least restrictive device with independent level of assistance.       Start:  07/06/24    Expected End:  07/20/24

## 2024-07-06 NOTE — PROGRESS NOTES
Internal Medicine - Daily Progress Note   Hospital Day: 4       Name:Sol Gomez, AGE: 84 y.o., GENDER: female, MRN: [id number], ROOM: 118/118-A   CODE STATUS: Full Code  Attending Physician: David Mcmahan DO  Resident: Bismark Guerrero DO        Chief Complaint     Chief Complaint   Patient presents with   • Weakness, Gen   • Fall     Pt has had multiple falls X 24 hours. Pt denies any injuries and reports recurrent left sided neck/shoulder pain. Pt denies blood thinners, LOC. Pt reports normally ambulating with a walker. Pt had a TIA X 3 weeks ago.        Subjective    Sol Gomez is a 84 y.o. year old female patient on Hospital Day: 4 with pmh of multiple CVAs, Diabetes Insipidus, carpal tunnel syndrome, and HTN who presented to the hospital for multiple falls over the past 3 days and a burning sensation in her left upper extremity. Patient's tachycardia has improved with HR of 105 on examination. She continues to have occasional PVCs, but reports no symptoms at this time. PT evaluated the patient and recommended Moderate intensity level of continued care upon discharge    Overnight events:  NAOE    Meds    Scheduled medications  atorvastatin, 80 mg, oral, Nightly  desmopressin, 0.05 mg, oral, BID  enoxaparin, 40 mg, subcutaneous, q24h  gabapentin, 300 mg, oral, TID  metoprolol tartrate, 25 mg, oral, BID      Continuous medications     PRN medications  PRN medications: acetaminophen, [] hydrALAZINE **FOLLOWED BY** hydrALAZINE, labetaloL, oxygen     Objective    Physical Exam  Constitutional:       General: She is not in acute distress.     Appearance: She is not ill-appearing.   HENT:      Head: Normocephalic.      Right Ear: External ear normal.      Left Ear: External ear normal.   Eyes:      Extraocular Movements: Extraocular movements intact.      Conjunctiva/sclera: Conjunctivae normal.   Cardiovascular:      Rate and Rhythm: Regular rhythm. Tachycardia present.      Pulses: Normal  "pulses.      Heart sounds: Normal heart sounds. No murmur heard.  Pulmonary:      Effort: Pulmonary effort is normal. No respiratory distress.      Breath sounds: No wheezing or rhonchi.   Abdominal:      General: Abdomen is flat. There is no distension.      Palpations: Abdomen is soft.   Musculoskeletal:         General: No swelling. Normal range of motion.      Cervical back: Normal range of motion.      Right lower leg: No edema.      Left lower leg: No edema.   Neurological:      General: No focal deficit present.      Mental Status: She is alert and oriented to person, place, and time.      Cranial Nerves: No cranial nerve deficit.        Visit Vitals  /80 (BP Location: Right arm, Patient Position: Lying)   Pulse 77   Temp 36.7 °C (98.1 °F) (Temporal)   Resp 18   Ht 1.651 m (5' 5\")   Wt 69.9 kg (154 lb 1.6 oz)   SpO2 97%   BMI 25.64 kg/m²   OB Status Postmenopausal   BSA 1.79 m²        Intake/Output Summary (Last 24 hours) at 7/6/2024 1030  Last data filed at 7/6/2024 0900  Gross per 24 hour   Intake 780 ml   Output 900 ml   Net -120 ml       Labs:   Results from last 72 hours   Lab Units 07/06/24  0613 07/05/24  0640 07/04/24  2338   SODIUM mmol/L 144 145 138   POTASSIUM mmol/L 4.3 3.9 4.0   CHLORIDE mmol/L 109* 108* 103   CO2 mmol/L 24 30 30   BUN mg/dL 13 13 17   CREATININE mg/dL 0.70 0.79 0.88   GLUCOSE mg/dL 93 109* 115*   CALCIUM mg/dL 8.9 9.6 9.3   ANION GAP mmol/L 15 11 9*   EGFR mL/min/1.73m*2 85 74 65   PHOSPHORUS mg/dL 3.6 3.2 3.5      Results from last 72 hours   Lab Units 07/06/24  0613 07/05/24  0639 07/04/24  0551 07/03/24  1040   WBC AUTO x10*3/uL 12.0* 9.2 10.3 10.3   HEMOGLOBIN g/dL 12.7 13.6 11.5* 12.5   HEMATOCRIT % 41.2 42.2 33.5* 36.7   PLATELETS AUTO x10*3/uL 212 235 186 210   NEUTROS PCT AUTO %  --   --   --  81.3   LYMPHS PCT AUTO %  --   --   --  8.4   MONOS PCT AUTO %  --   --   --  9.5   EOS PCT AUTO %  --   --   --  0.1      Lab Results   Component Value Date    CALCIUM 8.9 " "07/06/2024    PHOS 3.6 07/06/2024      No results found for: \"CRP\"      Micro/ID:   Susceptibility data from last 90 days.  Collected Specimen Info Organism Ampicillin Cefazolin Cefazolin (uncomplicated UTIs only) Ciprofloxacin Gentamicin Nitrofurantoin Piperacillin/Tazobactam Tobramycin Trimethoprim/Sulfamethoxazole   06/24/24 Urine from Clean Catch/Voided Escherichia coli  S  S  S  S  R  S  S  R  S                    No lab exists for component: \"AGALPCRNB\"     Lab Results   Component Value Date    URINECULTURE (A) 07/03/2024     Multiple organisms present, probable contamination. Repeat culture if clinically indicated.       Images    CT angio head and neck w and wo IV contrast    Result Date: 7/3/2024  Interpreted By:  Rupesh Del Rosario, STUDY: CT ANGIO HEAD AND NECK W AND WO IV CONTRAST;  7/3/2024 6:46 pm   INDICATION: Signs/Symptoms:Stroke rule out   COMPARISON: 07/03/2024 CT head without contrast, 07/03/2024 MRI brain   ACCESSION NUMBER(S): [id number]   ORDERING CLINICIAN: MARÍA DERW   TECHNIQUE: Multiple contiguous axial noncontrast images of the head were obtained. Following IV contrast administration of iodinated contrast, a CT angiography of the head and neck was performed. MIPS and 3D reconstructions of the [place] and neck were created on an independent workstation and reviewed.   FINDINGS: CTA NECK:       LEFT VERTEBRAL ARTERY:  No hemodynamically significant stenosis, occlusion, or dissection.   LEFT COMMON/INTERNAL CAROTID ARTERY: Minimal calcific atherosclerosis at the bulb. No hemodynamically significant stenosis, occlusion, or dissection.   RIGHT VERTEBRAL ARTERY: Non dominant. No hemodynamically significant stenosis, occlusion, or dissection.   RIGHT COMMON/INTERNAL CAROTID ARTERY: Mild calcified atherosclerosis of the distal CCA and carotid bulb. No hemodynamically significant stenosis, occlusion, or dissection.     The neck soft tissues show no evidence of mass, fluid collection, " or enlarged lymph nodes. Multinodular goiter. There is no acute osseous abnormality. Multilevel degenerative changes of the cervical spine with moderate canal stenosis at C5-C6 and mild canal stenosis at C6-C7 due to disc spur complexes. There is also multilevel foraminal stenosis that is notably severe bilaterally at C3-C4, moderate-severe bilaterally at C4-C5, severe bilaterally at C5-C6, and moderate on the left at C6-C7.       CTA HEAD:   ANTERIOR CIRCULATION: No aneurysm.   - Internal Carotid Arteries: Mild calcific atherosclerosis bilaterally. No hemodynamically significant stenosis or occlusion.   - Middle Cerebral Arteries:  No hemodynamically significant stenosis or occlusion.   - Anterior Cerebral Arteries: There are few areas of severe focal stenosis of the left pericallosal anterior cerebral artery. There is a single area of mild stenosis of the right pericallosal anterior cerebral artery.     POSTERIOR CIRCULATION: No aneurysm.   - Intracranial Vertebral Arteries: Minimal calcific atherosclerosis bilaterally. No hemodynamically significant stenosis or occlusion. The PICA are patent bilaterally.   - Basilar Artery:  No hemodynamically significant stenosis or occlusion.   - Posterior Cerebral Arteries: There is a focal severe stenosis of the right calcarine artery. Otherwise, no hemodynamically significant stenosis or occlusion.   No arteriovenous malformation is visualized. No pathologic intracranial enhancement or discrete mass. The dural venous sinuses are patent.   MIPS and 3D reconstructions confirm the above findings.       1. No large vessel intracranial or extracranial occlusion. 2. No hemodynamically significant stenosis in the neck. 3. 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 Several areas of severe focal stenosis in the left pericallosal anterior cerebral artery. 4. Multilevel cervical spondylosis resulting in high-grade foraminal stenosis at most levels as detailed above, and  moderate canal stenosis at C5-C6.       MACRO: None.   Signed by: Rupesh Del Rosario 7/3/2024 7:18 PM Dictation workstation:   [id number]    MR brain wo IV contrast    Result Date: 7/3/2024  Interpreted By:  Arnol Gonzales, STUDY: MR BRAIN WO IV CONTRAST; 7/3/2024 5:15 pm   INDICATION: Signs/Symptoms:Stroke like symptoms;   COMPARISON: CT brain 07/30/2024   ACCESSION NUMBER(S): [id number]   ORDERING CLINICIAN: MARÍA DREW   TECHNIQUE: Routine brain MRI protocol without  contrast including diffusion and gradient echo images. Incomplete acquired sequences due to patient's inability to remain motion less.   FINDINGS: RESULT:   Examination is severely degraded by motion. Incomplete acquisition of sequences due to patient's inability to remain immobile. DWI images within the limits of evaluation, demonstrates no acute abnormality.   For aspirin echo T2 with a sequence, demonstrate no acute intracranial abnormality. Patchy and confluent foci of hyperintense signal on T2 weighted and FLAIR images are noted involving the supratentorial white matter are nonspecific but likely represent moderate microvascular ischemia. Sequela of multiple prior supratentorial infarcts. Moderate generalized volume loss with concordant ventriculomegaly.       Limited examination due to incomplete sequences. Within the limits of evaluation as described above, no acute intracranial abnormality.   Signed by: Arnol Gonzales 7/3/2024 5:33 PM Dictation workstation:   [id number]    Transthoracic Echo (TTE) Complete    Result Date: 7/3/2024   [facility], [address] [place] [address]               Tel [phone] and Fax [phone] TRANSTHORACIC ECHOCARDIOGRAM REPORT  Patient Name:      RYAN MCCABE   Reading Physician:    [id number] Nancy Quezada MD Study Date:        7/3/2024            Ordering Provider:    [id number] MARÍA WILHELM                                                               VIKI MRN/PID:           [id number]            Fellow: Accession#:        [id number]        Nurse:                Elissa Kidd RN Date of Birth/Age: 1940 / 84      Sonographer:          Luma leblanc                                     RDSHELBY Gender:            F                   Additional Staff: Height:            165.10 cm           Admit Date:           7/3/2024 Weight:            69.85 kg            Admission Status:     Observation -                                                              Routine BSA / BMI:         1.77 m2 / 25.63     Encounter#:           [id number]                    kg/m2 Blood Pressure:    134/82 mmHg         Department Location:  [facility] Non                                                              Invasive Study Type:    TRANSTHORACIC ECHO (TTE) COMPLETE Diagnosis/ICD: Syncope and collapse-R55 Indication:    Syncope, Falls CPT Code:      Echo Complete w Full [id number] Patient History: Pertinent History: TIA and Falls. Study Detail: The following Echo studies were performed: 2D, M-Mode, Doppler and               color flow. Technically challenging study due to Pt movement               throughout study. Agitated saline used as a contrast agent for               intraseptal flow evaluation. The patient was awake.  PHYSICIAN INTERPRETATION: Left Ventricle: The left ventricular systolic function is low normal, with a visually estimated ejection fraction of 50-55%. The left ventricular cavity size is normal. Spectral Doppler shows an impaired relaxation pattern of left ventricular diastolic filling. Left Atrium: The left atrium is normal in size. A bubble study using agitated saline was performed. Bubble study is negative. Right Ventricle: The right ventricle is normal in size. There is normal right ventricular global systolic function. Right  Atrium: The right atrium is normal in size. Aortic Valve: The aortic valve was not well visualized. There is no evidence of aortic valve stenosis. The aortic valve dimensionless index is 0.51. There is no evidence of aortic valve regurgitation. The peak instantaneous gradient of the aortic valve is 9.8 mmHg. The mean gradient of the aortic valve is 5.4 mmHg. Mitral Valve: The mitral valve is mild to moderately thickened. There is mild mitral valve regurgitation. Tricuspid Valve: The tricuspid valve was not well visualized. There is trace to mild tricuspid regurgitation. The right ventricular systolic pressure is unable to be estimated. Pulmonic Valve: The pulmonic valve is not well visualized. There is physiologic pulmonic valve regurgitation. Pericardium: There is no pericardial effusion noted. There is a pericardial fat pad present. Aorta: The aortic root was not well visualized. Systemic Veins: The inferior vena cava size appears small. There is IVC inspiratory collapse greater than 50%. In comparison to the previous echocardiogram(s): There are no prior studies on this patient for comparison purposes.  CONCLUSIONS:  1. The left ventricular systolic function is low normal, with a visually estimated ejection fraction of 50-55%.  2. Spectral Doppler shows an impaired relaxation pattern of left ventricular diastolic filling.  3. There is normal right ventricular global systolic function.  4. Aortic valve stenosis is not present. QUANTITATIVE DATA SUMMARY: 2D MEASUREMENTS:                          Normal Ranges: IVSd:          1.13 cm   (0.6-1.1cm) LVPWd:         0.56 cm   (0.6-1.1cm) LVIDd:         5.00 cm   (3.9-5.9cm) LVIDs:         3.58 cm LV Mass Index: 82.2 g/m2 LV % FS        28.4 % LA VOLUME:                               Normal Ranges: LA Vol A4C:        23.3 ml    (22+/-6mL/m2) LA Vol A2C:        43.0 ml LA Vol BP:         31.6 ml LA Vol Index A4C:  13.1 ml/m2 LA Vol Index A2C:  24.3 ml/m2 LA Vol Index BP:    17.9 ml/m2 LA Area A4C:       11.7 cm2 LA Area A2C:       15.9 cm2 LA Major Axis A4C: 5.0 cm LA Major Axis A2C: 5.0 cm LA Volume Index:   17.7 ml/m2 LA Vol A4C:        20.4 ml LA Vol A2C:        40.5 ml RA VOLUME BY A/L METHOD:                       Normal Ranges: RA Area A4C: 10.6 cm2 LV SYSTOLIC FUNCTION BY 2D PLANIMETRY (MOD):                      Normal Ranges: EF-A4C View:    47 % (>=55%) EF-A2C View:    39 % EF-Biplane:     42 % EF-Visual:      53 % LV EF Reported: 53 % LV DIASTOLIC FUNCTION:                          Normal Ranges: MV Peak E:    0.67 m/s   (0.7-1.2 m/s) MV Peak A:    0.98 m/s   (0.42-0.7 m/s) E/A Ratio:    0.68       (1.0-2.2) MV e'         0.065 m/s  (>8.0) MV lateral e' 0.07 m/s MV medial e'  0.06 m/s MV A Dur:     77.07 msec E/e' Ratio:   10.27      (<8.0) MITRAL VALVE:                 Normal Ranges: MV DT: 148 msec (150-240msec) AORTIC VALVE:                                   Normal Ranges: AoV Vmax:                1.57 m/s (<=1.7m/s) AoV Peak P.8 mmHg (<20mmHg) AoV Mean P.4 mmHg (1.7-11.5mmHg) LVOT Max Tom:            0.93 m/s (<=1.1m/s) AoV VTI:                 31.68 cm (18-25cm) LVOT VTI:                16.08 cm LVOT Diameter:           1.90 cm  (1.8-2.4cm) AoV Area, VTI:           1.44 cm2 (2.5-5.5cm2) AoV Area,Vmax:           1.69 cm2 (2.5-4.5cm2) AoV Dimensionless Index: 0.51  RIGHT VENTRICLE: RV Basal 2.50 cm RV Mid   1.50 cm RV Major 7.5 cm RV s'    0.13 m/s TRICUSPID VALVE/RVSP:                            Normal Ranges: Peak TR Velocity: 0.99 m/s RV Syst Pressure: 6.9 mmHg (< 30mmHg) IVC Diam:         0.90 cm PULMONIC VALVE:                      Normal Ranges: PV Max Tom: 0.7 m/s  (0.6-0.9m/s) PV Max P.9 mmHg AORTA: Asc Ao Diam 4.00 cm  [id number] Nancy Quezada MD Electronically signed on 7/3/2024 at 3:50:57 PM  ** Final **     ECG 12 lead    Result Date: 7/3/2024  Sinus rhythm with occasional Premature ventricular complexes Possible Left atrial  enlargement Anterior infarct (cited on or before 24-JUN-2024) Abnormal ECG When compared with ECG of 24-JUN-2024 15:25, Premature ventricular complexes are now Present QRS axis Shifted left    XR humerus left    Result Date: 7/3/2024  Interpreted By:  Katie Peralta, STUDY: XR HUMERUS LEFT; ;  7/3/2024 12:17 pm   INDICATION: Signs/Symptoms:L arm injury.   COMPARISON: None.   ACCESSION NUMBER(S): [id number]   ORDERING CLINICIAN: ERON BURGESS   FINDINGS: No acute fracture or dislocation. Calcific tendinosis of the rotator cuff.       No acute osseous abnormality.   Signed by: Katie Peralta 7/3/2024 12:32 PM Dictation workstation:   [id number]    CT head wo IV contrast    Result Date: 7/3/2024  Interpreted By:  Samantha Merritt, STUDY: CT HEAD WO IV CONTRAST;  7/3/2024 11:21 am   INDICATION: Signs/Symptoms:fall head injury.   COMPARISON: 06/24/2024   ACCESSION NUMBER(S): [id number]   ORDERING CLINICIAN: ERON BURGESS   TECHNIQUE: Examination was performed in the axial plane using soft tissue and bone algorithm.   FINDINGS: INTRACRANIAL: There is prominence of the ventricular system and cerebral sulci consistent with cerebral atrophy. There are periventricular hypodensities consistent with  marked small vessel disease. No mass or mass effect is identified. There is no hemorrhage or subdural fluid collection. There is no acute infarct. There are small remote bilateral thalamic infarcts, seen previously. There is no fracture of the calvarium.   EXTRACRANIAL: Visualized paranasal sinuses and mastoids are clear.       No acute intracranial pathology.   MACRO: None   Signed by: Samantha Merritt 7/3/2024 11:45 AM Dictation workstation:   [id number]    ECG 12 lead    Result Date: 6/28/2024  Normal sinus rhythm Possible Anterior infarct , age undetermined Abnormal ECG When compared with ECG of 17-JAN-2012 08:10, Borderline criteria for Anterior infarct are now Present Nonspecific T wave abnormality now evident in  Anterior leads See ED provider note for full interpretation and clinical correlation Confirmed by Mariya Carter ([id number]) on 6/28/2024 2:08:38 PM    CT head wo IV contrast    Result Date: 6/24/2024  Interpreted By:  Syed Carlin, STUDY: CT HEAD WO IV CONTRAST;  6/24/2024 3:50 pm   INDICATION: Signs/Symptoms:TIA earlier in the week.   COMPARISON: None.   ACCESSION NUMBER(S): [id number]   ORDERING CLINICIAN: AUBREY CARIAS   TECHNIQUE: Noncontrast axial CT scan of head was performed. Angled reformats in brain and bone windows were generated. The images were reviewed in bone, brain, blood and soft tissue windows.   FINDINGS: There is no intra/extra-axial fluid collection, mass effect, or midline shift. The gray/white matter junction is preserved. Hypoattenuation of periventricular and subcortical white matter suggestive of chronic small vessel ischemic disease. Tiny old lacunar infarct is noted in the centrum semiovale abutting the body of the left lateral ventricle. Minimal diffuse parenchymal volume loss is seen the basal cisterns are patent. Visualized paranasal sinuses and mastoid air cells are clear. The calvarium is intact.       No acute intracranial finding. MRI may be obtained if clinically indicated.   Chronic nonemergent findings as above   Signed by: Syed Carlin 6/24/2024 4:16 PM Dictation workstation:   [id number]      Assessment and Plan    Sol Gomez is a 84 y.o. female admitted on 7/3/2024 with a pmh of multiple CVAs, Diabetes Insipidus, carpal tunnel syndrome, and HTN who presented to the hospital for multiple falls over the past 3 days and a burning sensation in her left upper extremity.          ACUTE MEDICAL ISSUES:  #Reoccurring PVCs with tachycardia  #Syncopal Episode  #Falls  - Patient experienced has been experiencing frequent falls with a syncopal episode 3 days prior to admission  - Patient heart rate has decreased to 105, she continues to have reoccurring PVCs on telemetry  -  Echo 7/3/24 showed EF 50-55%  Plan:  - Cardiology consulted and recommended 25 mg metoprolol tartrate BID and Zio patch and EP study after discharge  - Metoprolol Tartrate 25 mg BID     # C/f Autoimmune Encephalitis   - Pt presenting with L UE irregular movements   - CT head wo con showed chronic L basal ganglia lacunar stroke   - Neuro consulted, appreciate recs   - Pt refused Aspirin 81mg d/t epistaxis   - Atorvastatin 80 mg daily   - MRI brain wo contrast limited d/t movement   - MRA head/neck showed focal severe stenosis in right calcarine & left pericallosal ant cerebral arteries, focal high-grade cervical foraminal stenosis   Plan:  - Anti NMDAR, GABAa, GABAb, AMPA and glycine receptors, AQP4, voltage-gated potassium channels ordered --> If (+) may need to start IVIG   - Continue Gabapentin 300mg TID   - PT evaluated the patient and recommended Moderate intensity level of continued care upon discharge     # Symptomatic Hyponatremia  # Hx Diabetes Insipidus  # Likely SIADH  - Na 125 on admission (baseline wnl)    - Could be d/t desmopressin use for DI  - Urine electrolytes consistent with SIADH  - Na increased to 145, Urinary output>2L overnight  Plan:  - Nephrology consulted, urine electrolytes consisted with SIADH, recommended discontinuing fluid restriction and I.V fluids and decreasing dose of Desmopressin to 0.05 mg BID, appreciate reccs  - Hold home Desmopressin 0.1mg BID  - Initiate Desmopressin 0.05 mg BID  - Trend RFP every 6 hrs   - Recommend outpatient follow-up with Nephrology     ADDRESSED/ RESOLVED MEDICAL ISSUES:  # Acute Metabolic Encephalopathy   2/2 UTI vs Hyponatremia   - Pt no longer requiring soft wrist restraints, discontinued  - Patient completed a previous course of Cephalexin and 2 days of IV Rocephin, will discontinue Rocephin  -Patient has returned to baseline         CHRONIC MEDICAL ISSUES:  #Carpal Tunnel Syndrome-Pain Medication PRN  #HTN-Held Amlodipine for CVA workup. Hydralazine  and Labatelol PRN for SBP>220      Fluids: None  Electrolytes: Na wnl, monitor for hypernatremia  Nutrition: Adult regular diet  Antimicrobials: None  DVT ppx: Lovenox  GI ppx: None  Lines: PIV  Supplemental Oxygen: RA  Emergency Contact: Extended Emergency Contact Information  Primary Emergency Contact: Mellissa eVe Phone: [phone]  Relation: Child   Code: Full Code     Disposition: PT evaluated the patient and recommended Moderate intensity level of continued care upon discharge. Pending decision regarding SNF vs home with home health care.      Bismark Guerrero,   Internal Medicine, PGY- 1  07/06/24 at 10:30 AM

## 2024-07-06 NOTE — CARE PLAN
The patient's goals for the shift include      The clinical goals for the shift include progressing      Problem: General Stroke  Goal: Establish a mutual long term goal with patient by discharge  Outcome: Progressing  Goal: Demonstrate improvement in neurological exam throughout the shift  Outcome: Progressing  Goal: Maintain BP within ordered limits throughout shift  Outcome: Progressing  Goal: Participate in treatment (ie., meds, therapy) throughout shift  Outcome: Progressing  Goal: No symptoms of aspiration throughout shift  Outcome: Progressing  Goal: No symptoms of hemorrhage throughout shift  Outcome: Progressing  Goal: Tolerate enteral feeding throughout shift  Outcome: Progressing  Goal: Decreased nausea/vomiting throughout shift  Outcome: Progressing  Goal: Controlled blood glucose throughout shift  Outcome: Progressing  Goal: Out of bed three times today  Outcome: Progressing     Problem: Fall/Injury  Goal: Not fall by end of shift  Outcome: Progressing  Goal: Be free from injury by end of the shift  Outcome: Progressing  Goal: Verbalize understanding of personal risk factors for fall in the hospital  Outcome: Progressing  Goal: Verbalize understanding of risk factor reduction measures to prevent injury from fall in the home  Outcome: Progressing  Goal: Use assistive devices by end of the shift  Outcome: Progressing  Goal: Pace activities to prevent fatigue by end of the shift  Outcome: Progressing     Problem: Pain - Adult  Goal: Verbalizes/displays adequate comfort level or baseline comfort level  Outcome: Progressing     Problem: Safety - Adult  Goal: Free from fall injury  Outcome: Progressing     Problem: Discharge Planning  Goal: Discharge to home or other facility with appropriate resources  Outcome: Progressing     Problem: Chronic Conditions and Co-morbidities  Goal: Patient's chronic conditions and co-morbidity symptoms are monitored and maintained or improved  Outcome: Progressing     Problem:  Pain  Goal: Takes deep breaths with improved pain control throughout the shift  Outcome: Progressing  Goal: Turns in bed with improved pain control throughout the shift  Outcome: Progressing  Goal: Walks with improved pain control throughout the shift  Outcome: Progressing  Goal: Performs ADL's with improved pain control throughout shift  Outcome: Progressing  Goal: Participates in PT with improved pain control throughout the shift  Outcome: Progressing  Goal: Free from opioid side effects throughout the shift  Outcome: Progressing  Goal: Free from acute confusion related to pain meds throughout the shift  Outcome: Progressing     Problem: Skin  Goal: Decreased wound size/increased tissue granulation at next dressing change  Outcome: Progressing  Flowsheets (Taken 7/6/2024 0304)  Decreased wound size/increased tissue granulation at next dressing change: Protective dressings over bony prominences  Goal: Participates in plan/prevention/treatment measures  Outcome: Progressing  Flowsheets (Taken 7/6/2024 0304)  Participates in plan/prevention/treatment measures: Elevate heels  Goal: Prevent/manage excess moisture  Outcome: Progressing  Flowsheets (Taken 7/6/2024 0304)  Prevent/manage excess moisture: Moisturize dry skin  Goal: Prevent/minimize sheer/friction injuries  Outcome: Progressing  Flowsheets (Taken 7/6/2024 0304)  Prevent/minimize sheer/friction injuries: HOB 30 degrees or less  Goal: Promote/optimize nutrition  Outcome: Progressing  Flowsheets (Taken 7/6/2024 0304)  Promote/optimize nutrition:   Monitor/record intake including meals   Offer water/supplements/favorite foods  Goal: Promote skin healing  Outcome: Progressing  Flowsheets (Taken 7/6/2024 0304)  Promote skin healing:   Turn/reposition every 2 hours/use positioning/transfer devices   Assess skin/pad under line(s)/device(s)

## 2024-07-06 NOTE — PROGRESS NOTES
Physical Therapy    Physical Therapy Evaluation & Treatment    Patient Name: Sol Gomez  MRN: [id number]  Today's Date: 7/6/2024   Time Calculation  Start Time: 0826  Stop Time: 0858  Time Calculation (min): 32 min    Assessment/Plan Pt is an 85 yo female who presents to the hospital with acute metabolic encephalopathy. Prior to admission pt was independent with mobility with use of rollator, and independent with ADL's. Pt presents with impaired mobility and generalized weakness. Pt may benefit from PT services at this time for strengthening and mobility training to reduce the risk of falls and return towards prior level of function.  PT Assessment  PT Assessment Results: Decreased strength, Decreased endurance, Decreased mobility, Pain  Rehab Prognosis: Good  Evaluation/Treatment Tolerance: Patient tolerated treatment well  Medical Staff Made Aware: Yes  Strengths: Ability to acquire knowledge  End of Session Communication: Bedside nurse  End of Session Patient Position: Up in chair, Alarm on   IP OR SWING BED PT PLAN  Inpatient or Swing Bed: Inpatient  PT Plan  Treatment/Interventions: Bed mobility, Transfer training, Gait training, Strengthening, Therapeutic exercise, Therapeutic activity, Home exercise program  PT Plan: Ongoing PT  PT Frequency: 3 times per week  PT Discharge Recommendations: Moderate intensity level of continued care  Equipment Recommended upon Discharge: Wheeled walker  PT Recommended Transfer Status: Assist x2  PT - OK to Discharge: Yes      Subjective     General Visit Information:  General  Reason for Referral: Pt admitted with weakness and falls> acute metabolic encephalopathy  Referred By: Bismark Guerrero DO  Past Medical History Relevant to Rehab: PMH: multiple CVAs, Diabetes Insipidus, carpal tunnel syndrome, and HTN. TIA 3 weeks ago  Co-Treatment: OT  Co-Treatment Reason: To maximize pt mobility safely  Prior to Session Communication: Bedside nurse  Patient Position Received:  Bed, 3 rail up, Alarm on  General Comment: Pt [place], numbness in bilateral hands  Home Living:  Home Living  Type of Home: House  Lives With: Alone (Has a renter that helps when needed)  Home Adaptive Equipment:  (Rollator)  Home Layout: Able to live on main level with bedroom/bathroom  Home Access: Stairs to enter with rails  Entrance Stairs-Rails: Both  Entrance Stairs-Number of Steps: 5  Bathroom Shower/Tub: Walk-in shower  Bathroom Toilet: Adaptive toilet seating  Bathroom Equipment: Hand-held shower hose, Grab bars in shower  Prior Level of Function:  Prior Function Per Pt/Caregiver Report  Level of [place]: Independent with ADLs and functional transfers, Independent with homemaking with ambulation  Receives Help From:  (Daughter helps with grocery shopping)  Precautions:  Precautions  Medical Precautions: Fall precautions         Objective   Pain:  Pain Assessment  Pain Assessment: 0-10  0-10 (Numeric) Pain Score: 4  Pain Type: Chronic pain  Pain Location: Hand  Pain Orientation: Left  Cognition:  Cognition  Orientation Level: Oriented X4    General Assessments:  General Observation  General Observation: Purewick catheter, telemetry             Functional Assessments:  Bed Mobility  Bed Mobility: Yes  Bed Mobility 1  Bed Mobility 1: Supine to sitting  Level of Assistance 1: Minimum assistance    Transfers  Transfer: Yes  Transfer 1  Transfer From 1: Sit to, Bed to  Transfer to 1: Stand  Technique 1: Sit to stand  Transfer Device 1: Walker  Transfer Level of Assistance 1: Moderate assistance (x2)  Transfers 2  Transfer From 2: Stand to  Transfer to 2: Sit, Bed, Chair with arms  Technique 2: Stand to sit  Transfer Device 2: Walker  Transfer Level of Assistance 2: Moderate assistance (x2)    Ambulation/Gait Training  Ambulation/Gait Training Performed: Yes  Ambulation/Gait Training 1  Surface 1: Level tile  Device 1: Rolling walker  Assistance 1: Minimum assistance, Minimal verbal cues (x2)  Quality of Gait 1:  Inconsistent stride length  Comments/Distance (ft) 1: 3'       Treatments:       Bed Mobility  Bed Mobility: Yes  Bed Mobility 1  Bed Mobility 1: Supine to sitting  Level of Assistance 1: Minimum assistance    Ambulation/Gait Training  Ambulation/Gait Training Performed: Yes  Ambulation/Gait Training 1  Surface 1: Level tile  Device 1: Rolling walker  Assistance 1: Minimum assistance, Minimal verbal cues (x2)  Quality of Gait 1: Inconsistent stride length  Comments/Distance (ft) 1: 3'  Transfers  Transfer: Yes  Transfer 1  Transfer From 1: Sit to, Bed to  Transfer to 1: Stand  Technique 1: Sit to stand  Transfer Device 1: Walker  Transfer Level of Assistance 1: Moderate assistance (x2)  Transfers 2  Transfer From 2: Stand to  Transfer to 2: Sit, Bed, Chair with arms  Technique 2: Stand to sit  Transfer Device 2: Walker  Transfer Level of Assistance 2: Moderate assistance (x2)  Outcome Measures:  [facility] Basic Mobility  Turning from your back to your side while in a flat bed without using bedrails: A little  Moving from lying on your back to sitting on the side of a flat bed without using bedrails: A little  Moving to and from bed to chair (including a wheelchair): A lot  Standing up from a chair using your arms (e.g. wheelchair or bedside chair): A lot  To walk in hospital room: A little  Climbing 3-5 steps with railing: Total  Basic Mobility - Total Score: 14    Encounter Problems       Encounter Problems (Active)       Mobility       STG - Patient will ambulate at least 15' with FWW, CGA       Start:  07/06/24    Expected End:  07/20/24               PT Transfers       STG - Transfer from bed to chair with FWW, CGA       Start:  07/06/24    Expected End:  07/20/24            STG - Patient to transfer to and from sit to supine SBA       Start:  07/06/24    Expected End:  07/20/24            STG - Patient will transfer sit to and from stand with FWW, CGA       Start:  07/06/24    Expected End:  07/20/24                Pain - Adult              Education Documentation  Body Mechanics, taught by Savannah Francis PT at 7/6/2024 10:43 AM.  Learner: Patient  Readiness: Acceptance  Method: Explanation  Response: Verbalizes Understanding, Demonstrated Understanding    Mobility Training, taught by Savannah Francis PT at 7/6/2024 10:43 AM.  Learner: Patient  Readiness: Acceptance  Method: Explanation  Response: Verbalizes Understanding, Demonstrated Understanding    Education Comments  No comments found.

## 2024-07-07 VITALS
RESPIRATION RATE: 16 BRPM | WEIGHT: 154.1 LBS | HEART RATE: 73 BPM | HEIGHT: 65 IN | DIASTOLIC BLOOD PRESSURE: 76 MMHG | TEMPERATURE: 97.2 F | OXYGEN SATURATION: 94 % | BODY MASS INDEX: 25.67 KG/M2 | SYSTOLIC BLOOD PRESSURE: 129 MMHG

## 2024-07-07 LAB
ALBUMIN SERPL BCP-MCNC: 3.3 G/DL (ref 3.4–5)
ANION GAP SERPL CALC-SCNC: 12 MMOL/L (ref 10–20)
BUN SERPL-MCNC: 14 MG/DL (ref 6–23)
CALCIUM SERPL-MCNC: 8.5 MG/DL (ref 8.6–10.3)
CHLORIDE SERPL-SCNC: 106 MMOL/L (ref 98–107)
CO2 SERPL-SCNC: 27 MMOL/L (ref 21–32)
CREAT SERPL-MCNC: 0.6 MG/DL (ref 0.5–1.05)
EGFRCR SERPLBLD CKD-EPI 2021: 89 ML/MIN/1.73M*2
ERYTHROCYTE [DISTWIDTH] IN BLOOD BY AUTOMATED COUNT: 13.5 % (ref 11.5–14.5)
GLUCOSE SERPL-MCNC: 96 MG/DL (ref 74–99)
HCT VFR BLD AUTO: 37.8 % (ref 36–46)
HGB BLD-MCNC: 12.1 G/DL (ref 12–16)
MAGNESIUM SERPL-MCNC: 1.88 MG/DL (ref 1.6–2.4)
MCH RBC QN AUTO: 29.1 PG (ref 26–34)
MCHC RBC AUTO-ENTMCNC: 32 G/DL (ref 32–36)
MCV RBC AUTO: 91 FL (ref 80–100)
NRBC BLD-RTO: 0 /100 WBCS (ref 0–0)
PHOSPHATE SERPL-MCNC: 3.3 MG/DL (ref 2.5–4.9)
PLATELET # BLD AUTO: 198 X10*3/UL (ref 150–450)
POTASSIUM SERPL-SCNC: 4 MMOL/L (ref 3.5–5.3)
RBC # BLD AUTO: 4.16 X10*6/UL (ref 4–5.2)
SODIUM SERPL-SCNC: 141 MMOL/L (ref 136–145)
WBC # BLD AUTO: 9.7 X10*3/UL (ref 4.4–11.3)

## 2024-07-07 PROCEDURE — 99232 SBSQ HOSP IP/OBS MODERATE 35: CPT

## 2024-07-07 PROCEDURE — 84100 ASSAY OF PHOSPHORUS: CPT | Performed by: INTERNAL MEDICINE

## 2024-07-07 PROCEDURE — 2500000004 HC RX 250 GENERAL PHARMACY W/ HCPCS (ALT 636 FOR OP/ED)

## 2024-07-07 PROCEDURE — 85027 COMPLETE CBC AUTOMATED: CPT

## 2024-07-07 PROCEDURE — 2500000001 HC RX 250 WO HCPCS SELF ADMINISTERED DRUGS (ALT 637 FOR MEDICARE OP)

## 2024-07-07 PROCEDURE — 1200000002 HC GENERAL ROOM WITH TELEMETRY DAILY

## 2024-07-07 PROCEDURE — 83735 ASSAY OF MAGNESIUM: CPT

## 2024-07-07 PROCEDURE — 36415 COLL VENOUS BLD VENIPUNCTURE: CPT

## 2024-07-07 RX ORDER — LANOLIN ALCOHOL/MO/W.PET/CERES
400 CREAM (GRAM) TOPICAL ONCE
Status: COMPLETED | OUTPATIENT
Start: 2024-07-07 | End: 2024-07-07

## 2024-07-07 ASSESSMENT — COGNITIVE AND FUNCTIONAL STATUS - GENERAL
PERSONAL GROOMING: A LITTLE
DRESSING REGULAR UPPER BODY CLOTHING: A LITTLE
DRESSING REGULAR LOWER BODY CLOTHING: A LITTLE
HELP NEEDED FOR BATHING: A LITTLE
CLIMB 3 TO 5 STEPS WITH RAILING: A LOT
STANDING UP FROM CHAIR USING ARMS: A LOT
DAILY ACTIVITIY SCORE: 18
MOBILITY SCORE: 16
WALKING IN HOSPITAL ROOM: A LOT
MOVING TO AND FROM BED TO CHAIR: A LOT
TOILETING: A LOT

## 2024-07-07 ASSESSMENT — PAIN SCALES - GENERAL
PAINLEVEL_OUTOF10: 2
PAINLEVEL_OUTOF10: 3
PAINLEVEL_OUTOF10: 4

## 2024-07-07 ASSESSMENT — PAIN - FUNCTIONAL ASSESSMENT
PAIN_FUNCTIONAL_ASSESSMENT: 0-10

## 2024-07-07 ASSESSMENT — PAIN DESCRIPTION - LOCATION: LOCATION: HAND

## 2024-07-07 ASSESSMENT — PAIN DESCRIPTION - ORIENTATION: ORIENTATION: LEFT

## 2024-07-07 NOTE — PROGRESS NOTES
Internal Medicine - Daily Progress Note   Hospital Day: 5       Name:Sol Gomez, AGE: 84 y.o., GENDER: female, MRN: [id number], ROOM: 118/118-A   CODE STATUS: Full Code  Attending Physician: David Mcmahan DO  Resident: Bismark Guerrero DO        Chief Complaint     Chief Complaint   Patient presents with   • Weakness, Gen   • Fall     Pt has had multiple falls X 24 hours. Pt denies any injuries and reports recurrent left sided neck/shoulder pain. Pt denies blood thinners, LOC. Pt reports normally ambulating with a walker. Pt had a TIA X 3 weeks ago.        Subjective    Sol Gomez is a 84 y.o. year old female patient on Hospital Day: 5 with pmh of multiple CVAs, Diabetes Insipidus, carpal tunnel syndrome, and HTN who presented to the hospital for multiple falls over the past 3 days and a burning sensation in her left upper extremity. Patient's heart rate has stabilized. She is resting comfortably and reporting no complaints    Overnight events:  NAOE    Meds    Scheduled medications  atorvastatin, 80 mg, oral, Nightly  desmopressin, 0.05 mg, oral, BID  enoxaparin, 40 mg, subcutaneous, q24h  gabapentin, 300 mg, oral, TID  metoprolol tartrate, 25 mg, oral, BID  sennosides, 1 tablet, oral, BID      Continuous medications     PRN medications  PRN medications: acetaminophen, [] hydrALAZINE **FOLLOWED BY** hydrALAZINE, oxygen, polyethylene glycol     Objective    Physical Exam  Constitutional:       General: She is not in acute distress.     Appearance: Normal appearance. She is normal weight. She is not ill-appearing.   Eyes:      General:         Right eye: No discharge.         Left eye: No discharge.      Extraocular Movements: Extraocular movements intact.      Conjunctiva/sclera: Conjunctivae normal.   Cardiovascular:      Rate and Rhythm: Normal rate and regular rhythm.      Pulses: Normal pulses.      Heart sounds: Normal heart sounds.   Pulmonary:      Effort: Pulmonary effort is normal.  "No respiratory distress.      Breath sounds: Normal breath sounds. No wheezing or rhonchi.   Abdominal:      General: Abdomen is flat.      Palpations: Abdomen is soft.   Musculoskeletal:         General: Normal range of motion.      Cervical back: Normal range of motion. No rigidity or tenderness.   Skin:     General: Skin is warm and dry.   Neurological:      General: No focal deficit present.      Mental Status: She is alert and oriented to person, place, and time. Mental status is at baseline.   Psychiatric:         Mood and Affect: Mood normal.        Visit Vitals  BP (!) 132/95 (BP Location: Left arm, Patient Position: Sitting)   Pulse 94   Temp 36.2 °C (97.2 °F) (Temporal)   Resp 16   Ht 1.651 m (5' 5\")   Wt 69.9 kg (154 lb 1.6 oz)   SpO2 95%   BMI 25.64 kg/m²   OB Status Postmenopausal   Smoking Status Never   BSA 1.79 m²        Intake/Output Summary (Last 24 hours) at 7/7/2024 1220  Last data filed at 7/7/2024 1157  Gross per 24 hour   Intake 675 ml   Output 1200 ml   Net -525 ml       Labs:   Results from last 72 hours   Lab Units 07/07/24  0555 07/06/24  0613 07/05/24  0640   SODIUM mmol/L 141 144 145   POTASSIUM mmol/L 4.0 4.3 3.9   CHLORIDE mmol/L 106 109* 108*   CO2 mmol/L 27 24 30   BUN mg/dL 14 13 13   CREATININE mg/dL 0.60 0.70 0.79   GLUCOSE mg/dL 96 93 109*   CALCIUM mg/dL 8.5* 8.9 9.6   ANION GAP mmol/L 12 15 11   EGFR mL/min/1.73m*2 89 85 74   PHOSPHORUS mg/dL 3.3 3.6 3.2      Results from last 72 hours   Lab Units 07/07/24  0555 07/06/24  0613 07/05/24  0639   WBC AUTO x10*3/uL 9.7 12.0* 9.2   HEMOGLOBIN g/dL 12.1 12.7 13.6   HEMATOCRIT % 37.8 41.2 42.2   PLATELETS AUTO x10*3/uL 198 212 235      Lab Results   Component Value Date    CALCIUM 8.5 (L) 07/07/2024    PHOS 3.3 07/07/2024      No results found for: \"CRP\"      Micro/ID:   Susceptibility data from last 90 days.  Collected Specimen Info Organism Ampicillin Cefazolin Cefazolin (uncomplicated UTIs only) Ciprofloxacin Gentamicin " "Nitrofurantoin Piperacillin/Tazobactam Tobramycin Trimethoprim/Sulfamethoxazole   06/24/24 Urine from Clean Catch/Voided Escherichia coli  S  S  S  S  R  S  S  R  S                    No lab exists for component: \"AGALPCRNB\"     Lab Results   Component Value Date    URINECULTURE (A) 07/03/2024     Multiple organisms present, probable contamination. Repeat culture if clinically indicated.       Images    CT angio head and neck w and wo IV contrast    Result Date: 7/3/2024  Interpreted By:  Rupesh Del Rosario, STUDY: CT ANGIO HEAD AND NECK W AND WO IV CONTRAST;  7/3/2024 6:46 pm   INDICATION: Signs/Symptoms:Stroke rule out   COMPARISON: 07/03/2024 CT head without contrast, 07/03/2024 MRI brain   ACCESSION NUMBER(S): [id number]   ORDERING CLINICIAN: MARÍA DREW   TECHNIQUE: Multiple contiguous axial noncontrast images of the head were obtained. Following IV contrast administration of iodinated contrast, a CT angiography of the head and neck was performed. MIPS and 3D reconstructions of the [place] and neck were created on an independent workstation and reviewed.   FINDINGS: CTA NECK:       LEFT VERTEBRAL ARTERY:  No hemodynamically significant stenosis, occlusion, or dissection.   LEFT COMMON/INTERNAL CAROTID ARTERY: Minimal calcific atherosclerosis at the bulb. No hemodynamically significant stenosis, occlusion, or dissection.   RIGHT VERTEBRAL ARTERY: Non dominant. No hemodynamically significant stenosis, occlusion, or dissection.   RIGHT COMMON/INTERNAL CAROTID ARTERY: Mild calcified atherosclerosis of the distal CCA and carotid bulb. No hemodynamically significant stenosis, occlusion, or dissection.     The neck soft tissues show no evidence of mass, fluid collection, or enlarged lymph nodes. Multinodular goiter. There is no acute osseous abnormality. Multilevel degenerative changes of the cervical spine with moderate canal stenosis at C5-C6 and mild canal stenosis at C6-C7 due to disc spur complexes. " There is also multilevel foraminal stenosis that is notably severe bilaterally at C3-C4, moderate-severe bilaterally at C4-C5, severe bilaterally at C5-C6, and moderate on the left at C6-C7.       CTA HEAD:   ANTERIOR CIRCULATION: No aneurysm.   - Internal Carotid Arteries: Mild calcific atherosclerosis bilaterally. No hemodynamically significant stenosis or occlusion.   - Middle Cerebral Arteries:  No hemodynamically significant stenosis or occlusion.   - Anterior Cerebral Arteries: There are few areas of severe focal stenosis of the left pericallosal anterior cerebral artery. There is a single area of mild stenosis of the right pericallosal anterior cerebral artery.     POSTERIOR CIRCULATION: No aneurysm.   - Intracranial Vertebral Arteries: Minimal calcific atherosclerosis bilaterally. No hemodynamically significant stenosis or occlusion. The PICA are patent bilaterally.   - Basilar Artery:  No hemodynamically significant stenosis or occlusion.   - Posterior Cerebral Arteries: There is a focal severe stenosis of the right calcarine artery. Otherwise, no hemodynamically significant stenosis or occlusion.   No arteriovenous malformation is visualized. No pathologic intracranial enhancement or discrete mass. The dural venous sinuses are patent.   MIPS and 3D reconstructions confirm the above findings.       1. No large vessel intracranial or extracranial occlusion. 2. No hemodynamically significant stenosis in the neck. 3. 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 Several areas of severe focal stenosis in the left pericallosal anterior cerebral artery. 4. Multilevel cervical spondylosis resulting in high-grade foraminal stenosis at most levels as detailed above, and moderate canal stenosis at C5-C6.       MACRO: None.   Signed by: Rupesh Del Rosario 7/3/2024 7:18 PM Dictation workstation:   [id number]    MR brain wo IV contrast    Result Date: 7/3/2024  Interpreted By:  Arnol Gonzales, STUDY: MR BRAIN  WO IV CONTRAST; 7/3/2024 5:15 pm   INDICATION: Signs/Symptoms:Stroke like symptoms;   COMPARISON: CT brain 07/30/2024   ACCESSION NUMBER(S): [id number]   ORDERING CLINICIAN: MARÍA DREW   TECHNIQUE: Routine brain MRI protocol without  contrast including diffusion and gradient echo images. Incomplete acquired sequences due to patient's inability to remain motion less.   FINDINGS: RESULT:   Examination is severely degraded by motion. Incomplete acquisition of sequences due to patient's inability to remain immobile. DWI images within the limits of evaluation, demonstrates no acute abnormality.   For aspirin echo T2 with a sequence, demonstrate no acute intracranial abnormality. Patchy and confluent foci of hyperintense signal on T2 weighted and FLAIR images are noted involving the supratentorial white matter are nonspecific but likely represent moderate microvascular ischemia. Sequela of multiple prior supratentorial infarcts. Moderate generalized volume loss with concordant ventriculomegaly.       Limited examination due to incomplete sequences. Within the limits of evaluation as described above, no acute intracranial abnormality.   Signed by: Arnol Gonzales 7/3/2024 5:33 PM Dictation workstation:   [id number]    Transthoracic Echo (TTE) Complete    Result Date: 7/3/2024   [facility], [address] [place] [address]               Tel [phone] and Fax [phone] TRANSTHORACIC ECHOCARDIOGRAM REPORT  Patient Name:      RYAN MCCABE   Reading Physician:    [id number] Nancy Quezada MD Study Date:        7/3/2024            Ordering Provider:    [id number] MARÍA DREW MRN/PID:           [id number]            Fellow: Accession#:        [id number]        Nurse:                Elissa Kidd RN Date of  Birth/Age: 1940 / 84      Sonographer:          Luma Yordangabriel leblanc                                     CS Gender:            F                   Additional Staff: Height:            165.10 cm           Admit Date:           7/3/2024 Weight:            69.85 kg            Admission Status:     Observation -                                                              Routine BSA / BMI:         1.77 m2 / 25.63     Encounter#:           [id number]                    kg/m2 Blood Pressure:    134/82 mmHg         Department Location:  [facility] Non                                                              Invasive Study Type:    TRANSTHORACIC ECHO (TTE) COMPLETE Diagnosis/ICD: Syncope and collapse-R55 Indication:    Syncope, Falls CPT Code:      Echo Complete w Full Doppler-[id number] Patient History: Pertinent History: TIA and Falls. Study Detail: The following Echo studies were performed: 2D, M-Mode, Doppler and               color flow. Technically challenging study due to Pt movement               throughout study. Agitated saline used as a contrast agent for               intraseptal flow evaluation. The patient was awake.  PHYSICIAN INTERPRETATION: Left Ventricle: The left ventricular systolic function is low normal, with a visually estimated ejection fraction of 50-55%. The left ventricular cavity size is normal. Spectral Doppler shows an impaired relaxation pattern of left ventricular diastolic filling. Left Atrium: The left atrium is normal in size. A bubble study using agitated saline was performed. Bubble study is negative. Right Ventricle: The right ventricle is normal in size. There is normal right ventricular global systolic function. Right Atrium: The right atrium is normal in size. Aortic Valve: The aortic valve was not well visualized. There is no evidence of aortic valve stenosis. The aortic valve dimensionless index is 0.51. There is no evidence of aortic valve  regurgitation. The peak instantaneous gradient of the aortic valve is 9.8 mmHg. The mean gradient of the aortic valve is 5.4 mmHg. Mitral Valve: The mitral valve is mild to moderately thickened. There is mild mitral valve regurgitation. Tricuspid Valve: The tricuspid valve was not well visualized. There is trace to mild tricuspid regurgitation. The right ventricular systolic pressure is unable to be estimated. Pulmonic Valve: The pulmonic valve is not well visualized. There is physiologic pulmonic valve regurgitation. Pericardium: There is no pericardial effusion noted. There is a pericardial fat pad present. Aorta: The aortic root was not well visualized. Systemic Veins: The inferior vena cava size appears small. There is IVC inspiratory collapse greater than 50%. In comparison to the previous echocardiogram(s): There are no prior studies on this patient for comparison purposes.  CONCLUSIONS:  1. The left ventricular systolic function is low normal, with a visually estimated ejection fraction of 50-55%.  2. Spectral Doppler shows an impaired relaxation pattern of left ventricular diastolic filling.  3. There is normal right ventricular global systolic function.  4. Aortic valve stenosis is not present. QUANTITATIVE DATA SUMMARY: 2D MEASUREMENTS:                          Normal Ranges: IVSd:          1.13 cm   (0.6-1.1cm) LVPWd:         0.56 cm   (0.6-1.1cm) LVIDd:         5.00 cm   (3.9-5.9cm) LVIDs:         3.58 cm LV Mass Index: 82.2 g/m2 LV % FS        28.4 % LA VOLUME:                               Normal Ranges: LA Vol A4C:        23.3 ml    (22+/-6mL/m2) LA Vol A2C:        43.0 ml LA Vol BP:         31.6 ml LA Vol Index A4C:  13.1 ml/m2 LA Vol Index A2C:  24.3 ml/m2 LA Vol Index BP:   17.9 ml/m2 LA Area A4C:       11.7 cm2 LA Area A2C:       15.9 cm2 LA Major Axis A4C: 5.0 cm LA Major Axis A2C: 5.0 cm LA Volume Index:   17.7 ml/m2 LA Vol A4C:        20.4 ml LA Vol A2C:        40.5 ml RA VOLUME BY A/L METHOD:                        Normal Ranges: RA Area A4C: 10.6 cm2 LV SYSTOLIC FUNCTION BY 2D PLANIMETRY (MOD):                      Normal Ranges: EF-A4C View:    47 % (>=55%) EF-A2C View:    39 % EF-Biplane:     42 % EF-Visual:      53 % LV EF Reported: 53 % LV DIASTOLIC FUNCTION:                          Normal Ranges: MV Peak E:    0.67 m/s   (0.7-1.2 m/s) MV Peak A:    0.98 m/s   (0.42-0.7 m/s) E/A Ratio:    0.68       (1.0-2.2) MV e'         0.065 m/s  (>8.0) MV lateral e' 0.07 m/s MV medial e'  0.06 m/s MV A Dur:     77.07 msec E/e' Ratio:   10.27      (<8.0) MITRAL VALVE:                 Normal Ranges: MV DT: 148 msec (150-240msec) AORTIC VALVE:                                   Normal Ranges: AoV Vmax:                1.57 m/s (<=1.7m/s) AoV Peak P.8 mmHg (<20mmHg) AoV Mean P.4 mmHg (1.7-11.5mmHg) LVOT Max Tom:            0.93 m/s (<=1.1m/s) AoV VTI:                 31.68 cm (18-25cm) LVOT VTI:                16.08 cm LVOT Diameter:           1.90 cm  (1.8-2.4cm) AoV Area, VTI:           1.44 cm2 (2.5-5.5cm2) AoV Area,Vmax:           1.69 cm2 (2.5-4.5cm2) AoV Dimensionless Index: 0.51  RIGHT VENTRICLE: RV Basal 2.50 cm RV Mid   1.50 cm RV Major 7.5 cm RV s'    0.13 m/s TRICUSPID VALVE/RVSP:                            Normal Ranges: Peak TR Velocity: 0.99 m/s RV Syst Pressure: 6.9 mmHg (< 30mmHg) IVC Diam:         0.90 cm PULMONIC VALVE:                      Normal Ranges: PV Max Tom: 0.7 m/s  (0.6-0.9m/s) PV Max P.9 mmHg AORTA: Asc Ao Diam 4.00 cm  [id number] Nancy Quezada MD Electronically signed on 7/3/2024 at 3:50:57 PM  ** Final **     ECG 12 lead    Result Date: 7/3/2024  Sinus rhythm with occasional Premature ventricular complexes Possible Left atrial enlargement Anterior infarct (cited on or before 2024) Abnormal ECG When compared with ECG of 2024 15:25, Premature ventricular complexes are now Present QRS axis Shifted left    XR humerus left    Result Date:  7/3/2024  Interpreted By:  Katie Peralta, STUDY: XR HUMERUS LEFT; ;  7/3/2024 12:17 pm   INDICATION: Signs/Symptoms:L arm injury.   COMPARISON: None.   ACCESSION NUMBER(S): [id number]   ORDERING CLINICIAN: ERON BURGESS   FINDINGS: No acute fracture or dislocation. Calcific tendinosis of the rotator cuff.       No acute osseous abnormality.   Signed by: Katie Peralta 7/3/2024 12:32 PM Dictation workstation:   [id number]    CT head wo IV contrast    Result Date: 7/3/2024  Interpreted By:  Samantha Merritt, STUDY: CT HEAD WO IV CONTRAST;  7/3/2024 11:21 am   INDICATION: Signs/Symptoms:fall head injury.   COMPARISON: 06/24/2024   ACCESSION NUMBER(S): [id number]   ORDERING CLINICIAN: ERON BURGESS   TECHNIQUE: Examination was performed in the axial plane using soft tissue and bone algorithm.   FINDINGS: INTRACRANIAL: There is prominence of the ventricular system and cerebral sulci consistent with cerebral atrophy. There are periventricular hypodensities consistent with  marked small vessel disease. No mass or mass effect is identified. There is no hemorrhage or subdural fluid collection. There is no acute infarct. There are small remote bilateral thalamic infarcts, seen previously. There is no fracture of the calvarium.   EXTRACRANIAL: Visualized paranasal sinuses and mastoids are clear.       No acute intracranial pathology.   MACRO: None   Signed by: Samantha Merritt 7/3/2024 11:45 AM Dictation workstation:   [id number]    ECG 12 lead    Result Date: 6/28/2024  Normal sinus rhythm Possible Anterior infarct , age undetermined Abnormal ECG When compared with ECG of 17-JAN-2012 08:10, Borderline criteria for Anterior infarct are now Present Nonspecific T wave abnormality now evident in Anterior leads See ED provider note for full interpretation and clinical correlation Confirmed by Mariya Carter ([id number]) on 6/28/2024 2:08:38 PM    CT head wo IV contrast    Result Date: 6/24/2024  Interpreted By:  Tesfaye  Syed, STUDY: CT HEAD WO IV CONTRAST;  6/24/2024 3:50 pm   INDICATION: Signs/Symptoms:TIA earlier in the week.   COMPARISON: None.   ACCESSION NUMBER(S): [id number]   ORDERING CLINICIAN: AUBREY CARIAS   TECHNIQUE: Noncontrast axial CT scan of head was performed. Angled reformats in brain and bone windows were generated. The images were reviewed in bone, brain, blood and soft tissue windows.   FINDINGS: There is no intra/extra-axial fluid collection, mass effect, or midline shift. The gray/white matter junction is preserved. Hypoattenuation of periventricular and subcortical white matter suggestive of chronic small vessel ischemic disease. Tiny old lacunar infarct is noted in the centrum semiovale abutting the body of the left lateral ventricle. Minimal diffuse parenchymal volume loss is seen the basal cisterns are patent. Visualized paranasal sinuses and mastoid air cells are clear. The calvarium is intact.       No acute intracranial finding. MRI may be obtained if clinically indicated.   Chronic nonemergent findings as above   Signed by: Syed Carlin 6/24/2024 4:16 PM Dictation workstation:   [id number]      Assessment and Plan    Sol Gomez is a 84 y.o. female admitted on 7/3/2024 with a pmh of multiple CVAs, Diabetes Insipidus, carpal tunnel syndrome, and HTN who presented to the hospital for multiple falls over the past 3 days and a burning sensation in her left upper extremity.       ACUTE MEDICAL ISSUES:  #Reoccurring PVCs with tachycardia  #Syncopal Episode  #Falls  - Patient experienced has been experiencing frequent falls with a syncopal episode 3 days prior to admission  - Patient heart rate has stabilized  - Echo 7/3/24 showed EF 50-55%  Plan:  - Cardiology consulted and recommended 25 mg metoprolol tartrate BID and Zio patch and EP study after discharge  - Metoprolol Tartrate 25 mg BID     # C/f Autoimmune Encephalitis   - Pt presenting with L UE irregular movements   - CT head wo con showed  chronic L basal ganglia lacunar stroke   - Neuro consulted, appreciate recs   - Pt refused Aspirin 81mg d/t epistaxis   - Atorvastatin 80 mg daily   - MRI brain wo contrast limited d/t movement   - MRA head/neck showed focal severe stenosis in right calcarine & left pericallosal ant cerebral arteries, focal high-grade cervical foraminal stenosis   Plan:  - Anti NMDAR, GABAa, GABAb, AMPA and glycine receptors, AQP4, voltage-gated potassium channels ordered --> If (+) may need to start IVIG   - May follow-up for neurology labs outpatient  - Continue Gabapentin 300mg TID   - PT evaluated the patient and recommended Moderate intensity level of continued care upon discharge     # Symptomatic Hyponatremia  # Hx Diabetes Insipidus  # Likely SIADH  - Na 125 on admission (baseline wnl)    - Could be d/t desmopressin use for DI  - Urine electrolytes consistent with SIADH  - Current Na 141, Urinary output>2L overnight  Plan:  - Nephrology consulted, urine electrolytes consisted with SIADH, recommended discontinuing fluid restriction and I.V fluids and decreasing dose of Desmopressin to 0.05 mg BID, appreciate reccs  - Hold home Desmopressin 0.1mg BID  - Initiate Desmopressin 0.05 mg BID  - Trend RFP every 6 hrs   - Recommend outpatient follow-up with Nephrology      ADDRESSED/ RESOLVED MEDICAL ISSUES:  # Acute Metabolic Encephalopathy   2/2 UTI vs Hyponatremia   - Pt no longer requiring soft wrist restraints, discontinued  - Patient completed a previous course of Cephalexin and 2 days of IV Rocephin, will discontinue Rocephin  -Patient has returned to baseline      CHRONIC MEDICAL ISSUES:  #Carpal Tunnel Syndrome-Pain Medication PRN  #HTN-Held Amlodipine for CVA workup. Hydralazine and Labatelol PRN for SBP>220      Fluids: None  Electrolytes: Na wnl, monitor for hypernatremia  Nutrition: Adult regular diet  Antimicrobials: None  DVT ppx: Lovenox  GI ppx: None  Lines: PIV  Supplemental Oxygen: RA  Emergency Contact: Extended  Emergency Contact Information  Primary Emergency Contact: Mellissa Vee  Home Phone: [phone]  Relation: Child   Code: Full Code     Disposition: Pending transfer to [facility]      Bismark Guerrero DO  Internal Medicine, PGY- 1  07/07/24 at 12:20 PM

## 2024-07-07 NOTE — PROGRESS NOTES
"Physical Therapy                 Therapy Communication Note    Patient Name: Sol Gomez  MRN: [id number]  Today's Date: 7/7/2024     Discipline: Physical Therapy    Missed Visit Reason: Missed Visit Reason: Patient refused (patient declined stating \"I just did all that, actually I got up to the potty then slid over to the chair, thats about what I do \" no tx, nursing aware)    Missed Time: [id number]    Comment:  "

## 2024-07-07 NOTE — PROGRESS NOTES
07/07/24 1518   Discharge Planning   Patient expects to be discharged to: PT/ OT recommending mod frequency skilled services. Discused with pateint and daughter who are agreeable to SNF, choices provided and selected 1) [facility]) [facility] 3) [facility] 4) [facility]. Referral sent in [facility], await faciltiy response, will require precert

## 2024-07-08 VITALS
BODY MASS INDEX: 25.67 KG/M2 | DIASTOLIC BLOOD PRESSURE: 62 MMHG | OXYGEN SATURATION: 96 % | SYSTOLIC BLOOD PRESSURE: 106 MMHG | TEMPERATURE: 97.7 F | WEIGHT: 154.1 LBS | HEIGHT: 65 IN | HEART RATE: 89 BPM | RESPIRATION RATE: 18 BRPM

## 2024-07-08 PROBLEM — E87.1 HYPONATREMIA: Status: RESOLVED | Noted: 2024-07-04 | Resolved: 2024-07-08

## 2024-07-08 PROBLEM — G93.41 ACUTE METABOLIC ENCEPHALOPATHY: Status: RESOLVED | Noted: 2024-07-03 | Resolved: 2024-07-08

## 2024-07-08 LAB
(HCYS)2 SERPL QL: <5 UMOL/L
A-AMINOBUTYR SERPL QL: 34.6 UMOL/L
AAA SERPL-SCNC: 5 UMOL/L
ALANINE SERPL-SCNC: 542.2 UMOL/L (ref 160–530)
ALBUMIN SERPL BCP-MCNC: 3.3 G/DL (ref 3.4–5)
ALLOISOLEUCINE SERPL QL: <5 UMOL/L
ANION GAP SERPL CALC-SCNC: 11 MMOL/L (ref 10–20)
ANSERINE SERPL-SCNC: <20 UMOL/L
ARGININE SERPL-SCNC: 115.5 UMOL/L (ref 35–125)
ARGININOSUCCINATE SERPL-SCNC: <20 UMOL/L
ASPARAGINE/CREAT UR-RTO: 73.8 UMOL/L (ref 20–80)
ASPARTATE SERPL-SCNC: <5 UMOL/L
ATRIAL RATE: 103 BPM
ATRIAL RATE: 97 BPM
B-AIB SERPL-SCNC: 5.4 UMOL/L
B-ALANINE SERPL-SCNC: 8.6 UMOL/L
BUN SERPL-MCNC: 16 MG/DL (ref 6–23)
CALCIUM SERPL-MCNC: 8.6 MG/DL (ref 8.6–10.3)
CHLORIDE SERPL-SCNC: 106 MMOL/L (ref 98–107)
CITRULLINE SERPL-SCNC: 33.3 UMOL/L (ref 10–45)
CO2 SERPL-SCNC: 29 MMOL/L (ref 21–32)
CREAT SERPL-MCNC: 0.6 MG/DL (ref 0.5–1.05)
CYSTATHIONIN SERPL-SCNC: <5 UMOL/L
CYSTINE SERPL-SCNC: 83.6 UMOL/L (ref 10–65)
EGFRCR SERPLBLD CKD-EPI 2021: 89 ML/MIN/1.73M*2
ERYTHROCYTE [DISTWIDTH] IN BLOOD BY AUTOMATED COUNT: 13.4 % (ref 11.5–14.5)
ETHANOLAMINE SERPL-SCNC: 8.4 UMOL/L
GABA SERPL-SCNC: <5 UMOL/L
GLUCOSE SERPL-MCNC: 89 MG/DL (ref 74–99)
GLUTAMATE SERPL-SCNC: 22.7 UMOL/L (ref 15–130)
GLUTAMINE SERPL-SCNC: 820.7 UMOL/L (ref 380–680)
GLYCINE SERPL-SCNC: 316 UMOL/L (ref 140–420)
HCT VFR BLD AUTO: 37.1 % (ref 36–46)
HGB BLD-MCNC: 11.7 G/DL (ref 12–16)
HISTIDINE SERPL-SCNC: 63.8 UMOL/L (ref 50–130)
HOMOCITRULLINE SERPL-SCNC: <5 UMOL/L
ISOLEUCINE SERPL-SCNC: 66.7 UMOL/L (ref 30–120)
LEUCINE SERPL QL: 138.8 UMOL/L (ref 60–180)
LYSINE SERPL-ACNC: 242 UMOL/L (ref 85–230)
MAGNESIUM SERPL-MCNC: 2.03 MG/DL (ref 1.6–2.4)
MCH RBC QN AUTO: 29.1 PG (ref 26–34)
MCHC RBC AUTO-ENTMCNC: 31.5 G/DL (ref 32–36)
MCV RBC AUTO: 92 FL (ref 80–100)
METHIONINE SERPL-SCNC: 30 UMOL/L (ref 15–40)
NRBC BLD-RTO: 0 /100 WBCS (ref 0–0)
OH-LYSINE SERPL-SCNC: <5 UMOL/L
OH-PROLINE SERPL-SCNC: 34.2 UMOL/L (ref 5–40)
ORNITHINE SERPL-SCNC: 92.6 UMOL/L (ref 25–110)
P AXIS: 55 DEGREES
P AXIS: 59 DEGREES
P OFFSET: 214 MS
P OFFSET: 216 MS
P ONSET: 163 MS
P ONSET: 164 MS
PATH REV BLD -IMP: ABNORMAL
PHE SERPL-SCNC: 82.2 UMOL/L (ref 30–82)
PHE/TYR RATIO: 1
PHOSPHATE SERPL-MCNC: 3.8 MG/DL (ref 2.5–4.9)
PLATELET # BLD AUTO: 206 X10*3/UL (ref 150–450)
POTASSIUM SERPL-SCNC: 4.3 MMOL/L (ref 3.5–5.3)
PR INTERVAL: 128 MS
PR INTERVAL: 130 MS
PROLINE SERPL-SCNC: 275.3 UMOL/L (ref 90–350)
Q ONSET: 228 MS
Q ONSET: 228 MS
QRS COUNT: 15 BEATS
QRS COUNT: 17 BEATS
QRS DURATION: 66 MS
QRS DURATION: 68 MS
QT INTERVAL: 348 MS
QT INTERVAL: 372 MS
QTC CALCULATION(BAZETT): 455 MS
QTC CALCULATION(BAZETT): 472 MS
QTC FREDERICIA: 417 MS
QTC FREDERICIA: 436 MS
R AXIS: -18 DEGREES
R AXIS: 17 DEGREES
RBC # BLD AUTO: 4.02 X10*6/UL (ref 4–5.2)
SARCOSINE SERPL-SCNC: <5 UMOL/L
SERINE/CREAT UR-RTO: 106.2 UMOL/L (ref 60–170)
SODIUM SERPL-SCNC: 142 MMOL/L (ref 136–145)
T AXIS: 33 DEGREES
T AXIS: 40 DEGREES
T OFFSET: 402 MS
T OFFSET: 414 MS
TAURINE SERPL-SCNC: 29.6 UMOL/L (ref 30–130)
THREONINE SERPL-SCNC: 197.6 UMOL/L (ref 60–190)
TRYPTOPHAN SERPL QL: 58.5 UMOL/L (ref 25–80)
TYROSINE SERPL-SCNC: 78.9 UMOL/L (ref 35–110)
VALINE SERPL-SCNC: 240.7 UMOL/L (ref 120–320)
VENTRICULAR RATE: 103 BPM
VENTRICULAR RATE: 97 BPM
WBC # BLD AUTO: 8.9 X10*3/UL (ref 4.4–11.3)

## 2024-07-08 PROCEDURE — 2500000001 HC RX 250 WO HCPCS SELF ADMINISTERED DRUGS (ALT 637 FOR MEDICARE OP)

## 2024-07-08 PROCEDURE — 83735 ASSAY OF MAGNESIUM: CPT

## 2024-07-08 PROCEDURE — 99238 HOSP IP/OBS DSCHRG MGMT 30/<: CPT

## 2024-07-08 PROCEDURE — 36415 COLL VENOUS BLD VENIPUNCTURE: CPT

## 2024-07-08 PROCEDURE — 2500000004 HC RX 250 GENERAL PHARMACY W/ HCPCS (ALT 636 FOR OP/ED)

## 2024-07-08 PROCEDURE — 85027 COMPLETE CBC AUTOMATED: CPT

## 2024-07-08 PROCEDURE — 80069 RENAL FUNCTION PANEL: CPT | Performed by: INTERNAL MEDICINE

## 2024-07-08 RX ORDER — ATORVASTATIN CALCIUM 80 MG/1
80 TABLET, FILM COATED ORAL NIGHTLY
Start: 2024-07-08

## 2024-07-08 RX ORDER — GABAPENTIN 300 MG/1
300 CAPSULE ORAL 3 TIMES DAILY
Start: 2024-07-08

## 2024-07-08 RX ORDER — DESMOPRESSIN ACETATE 0.1 MG/1
0.05 TABLET ORAL 2 TIMES DAILY
Start: 2024-07-08

## 2024-07-08 RX ORDER — METOPROLOL TARTRATE 25 MG/1
25 TABLET, FILM COATED ORAL 2 TIMES DAILY
Start: 2024-07-08

## 2024-07-08 ASSESSMENT — PAIN SCALES - GENERAL: PAINLEVEL_OUTOF10: 0 - NO PAIN

## 2024-07-08 ASSESSMENT — COGNITIVE AND FUNCTIONAL STATUS - GENERAL
DRESSING REGULAR UPPER BODY CLOTHING: A LITTLE
MOBILITY SCORE: 14
MOVING FROM LYING ON BACK TO SITTING ON SIDE OF FLAT BED WITH BEDRAILS: A LITTLE
CLIMB 3 TO 5 STEPS WITH RAILING: A LOT
DRESSING REGULAR LOWER BODY CLOTHING: A LITTLE
DAILY ACTIVITIY SCORE: 18
WALKING IN HOSPITAL ROOM: A LOT
TURNING FROM BACK TO SIDE WHILE IN FLAT BAD: A LITTLE
TOILETING: A LOT
PERSONAL GROOMING: A LITTLE
MOVING TO AND FROM BED TO CHAIR: A LOT
STANDING UP FROM CHAIR USING ARMS: A LOT
HELP NEEDED FOR BATHING: A LITTLE

## 2024-07-08 NOTE — PROGRESS NOTES
07/08/24 1210   Discharge Planning   Living Arrangements Alone   Support Systems Children   Assistance Needed normally A&Ox3, independent with ADLs, does not drive (daughter or neighbor transport to appointments), rollator, cane, transfer chair, grab bars, shower chair, raised toilet with rails   Type of Residence Private residence   Number of Stairs to Enter Residence 4   Number of Stairs Within Residence 0   Do you have animals or pets at home? No   Home or Post Acute Services Post acute facilities (Rehab/SNF/etc)   Type of Post Acute Facility Services Skilled nursing   Patient expects to be discharged to: new 1. [facility], [facility] able to accept, [facility] asked [facility] to start precert, precert started 7/8 @ 5219. Patient's daughter, Mellissa, updated via phone   Does the patient need discharge transport arranged? Yes   RoundTrip coordination needed? Yes   Has discharge transport been arranged? No        07/08/24 1542   Discharge Planning   Patient expects to be discharged to: dc 7/8 to [facility], precert obtained 7/8. [facility] made aware to send final orders, transport confirmed for 1730. TCC placed call to patient's daughter, Mellissa, to update and she is agreeable.

## 2024-07-08 NOTE — PROGRESS NOTES
"HypoNa d/t adverse effect of desmopressin resolved    - No changes to POC  - We will s/o  - This patient is at low risk of renal decompensation     SUBJECTIVE  Pt interviewed. Chart reviewed  Interval events - Here for encephalopathy d/t low Na while on DDAVP. Labs look good of late. Not polyuric  Complaints - None    OBJECTIVE  /60   Pulse (!) 112   Temp 36.2 °C (97.2 °F) (Temporal)   Resp 16   Ht 1.651 m (5' 5\")   Wt 69.9 kg (154 lb 1.6 oz)   SpO2 96%   BMI 25.64 kg/m²    Awake and alert  DT noted      Sodium   Date/Time Value Ref Range Status   07/08/2024 06:07  136 - 145 mmol/L Final   07/07/2024 05:55  136 - 145 mmol/L Final   07/06/2024 06:13  136 - 145 mmol/L Final     Chloride   Date/Time Value Ref Range Status   07/08/2024 06:07  98 - 107 mmol/L Final   07/07/2024 05:55  98 - 107 mmol/L Final   07/06/2024 06:13  (H) 98 - 107 mmol/L Final     Urea Nitrogen   Date/Time Value Ref Range Status   07/08/2024 06:07 AM 16 6 - 23 mg/dL Final   07/07/2024 05:55 AM 14 6 - 23 mg/dL Final   07/06/2024 06:13 AM 13 6 - 23 mg/dL Final     Creatinine   Date/Time Value Ref Range Status   07/08/2024 06:07 AM 0.60 0.50 - 1.05 mg/dL Final   07/07/2024 05:55 AM 0.60 0.50 - 1.05 mg/dL Final   07/06/2024 06:13 AM 0.70 0.50 - 1.05 mg/dL Final     Potassium   Date/Time Value Ref Range Status   07/08/2024 06:07 AM 4.3 3.5 - 5.3 mmol/L Final   07/07/2024 05:55 AM 4.0 3.5 - 5.3 mmol/L Final   07/06/2024 06:13 AM 4.3 3.5 - 5.3 mmol/L Final     Bicarbonate   Date/Time Value Ref Range Status   07/08/2024 06:07 AM 29 21 - 32 mmol/L Final   07/07/2024 05:55 AM 27 21 - 32 mmol/L Final   07/06/2024 06:13 AM 24 21 - 32 mmol/L Final        Scheduled medications  atorvastatin, 80 mg, oral, Nightly  desmopressin, 0.05 mg, oral, BID  enoxaparin, 40 mg, subcutaneous, q24h  gabapentin, 300 mg, oral, TID  metoprolol tartrate, 25 mg, oral, BID  sennosides, 1 tablet, oral, BID      Continuous " medications     PRN medications  PRN medications: acetaminophen, [] hydrALAZINE **FOLLOWED BY** hydrALAZINE, oxygen, polyethylene glycol

## 2024-07-08 NOTE — DISCHARGE INSTRUCTIONS
Please, take your home medications as instructed after being discharged from the hospital.     NEW MEDICATIONS:  -Continue Atorvastatin 1 tablet, at bedtime  -Continue Gabapentin 1 capsule, three times a day  -Continue Metoprolol Tartrate 1 tablet, two times a day  -Reduce Desmopressin dose to 0.05 mg two times a day (You can also break your home 0.1 mg pill in half)   -Take Tylenol 1 tablet every 4 hours if needed for pain    UPCOMING APPOINTMENTS:   -Follow-up with out-patient nephrologist  -Receive a Zio patch from [facility] on 07/24/2024 at 11 am  Please, follow-up with your PCP within 7 to 14 days after leaving the hospital and your nephrologist within 30 days after leaving the hospital. /appointment services has been requested to make an appointment for you, however if you do not hear back from them within 1 to 2 days, please call your primary physician's office to schedule an appointment. Bring your photo ID and insurance card to your appointment. [facility]  services can be reached at [phone].     If you experience any worsening symptoms or have any concerns, please contact your primary care provider to schedule an appointment. If you cannot get in touch with your primary care physician, please return to the nearest emergency room or urgent care clinic for an evaluation and treatment.     Thank you for choosing [facility] and allowing us to partake in your medical care!     - Your [facility] inpatient primary care team.

## 2024-07-08 NOTE — PROGRESS NOTES
Internal Medicine - Daily Progress Note   Hospital Day: 6       Name:Sol Gomez, AGE: 84 y.o., GENDER: female, MRN: [id number], ROOM: 118/118-A   CODE STATUS: Full Code  Attending Physician: David Mcmahan DO  Resident: Bismark Guerrero DO        Chief Complaint     Chief Complaint   Patient presents with   • Weakness, Gen   • Fall     Pt has had multiple falls X 24 hours. Pt denies any injuries and reports recurrent left sided neck/shoulder pain. Pt denies blood thinners, LOC. Pt reports normally ambulating with a walker. Pt had a TIA X 3 weeks ago.        Subjective    Sol Gomez is a 84 y.o. year old female patient on Hospital Day: 6 with pmh of multiple CVAs, Diabetes Insipidus, carpal tunnel syndrome, and HTN who presented to the hospital for multiple falls over the past 3 days and a burning sensation in her left upper extremity. Patient reported no complaints. Her heart rate increased to 112, with /60. She was given 1L Lactated Ringers bolus and HR decreased to 89.    Overnight events:  NAOE    Meds    Scheduled medications  atorvastatin, 80 mg, oral, Nightly  desmopressin, 0.05 mg, oral, BID  enoxaparin, 40 mg, subcutaneous, q24h  gabapentin, 300 mg, oral, TID  lactated Ringer's, 1,000 mL, intravenous, Once  metoprolol tartrate, 25 mg, oral, BID  sennosides, 1 tablet, oral, BID      Continuous medications     PRN medications  PRN medications: acetaminophen, [] hydrALAZINE **FOLLOWED BY** hydrALAZINE, oxygen, polyethylene glycol     Objective    Physical Exam  Constitutional:       General: She is not in acute distress.     Appearance: Normal appearance. She is not ill-appearing.   HENT:      Mouth/Throat:      Mouth: Mucous membranes are dry.   Eyes:      Extraocular Movements: Extraocular movements intact.      Conjunctiva/sclera: Conjunctivae normal.   Cardiovascular:      Rate and Rhythm: Regular rhythm. Tachycardia present.      Pulses: Normal pulses.      Heart sounds:  "Normal heart sounds.   Pulmonary:      Effort: Pulmonary effort is normal. No respiratory distress.      Breath sounds: Normal breath sounds. No stridor. No rhonchi.   Abdominal:      General: Abdomen is flat. There is no distension.      Palpations: Abdomen is soft.      Tenderness: There is no abdominal tenderness.   Musculoskeletal:         General: Normal range of motion.      Right lower leg: No edema.      Left lower leg: No edema.   Neurological:      General: No focal deficit present.      Mental Status: She is alert and oriented to person, place, and time. Mental status is at baseline.      Cranial Nerves: No cranial nerve deficit.      Sensory: No sensory deficit.   Psychiatric:         Mood and Affect: Mood normal.        Visit Vitals  /62 (BP Location: Left arm, Patient Position: Lying)   Pulse 89   Temp 36.5 °C (97.7 °F) (Temporal)   Resp 18   Ht 1.651 m (5' 5\")   Wt 69.9 kg (154 lb 1.6 oz)   SpO2 96%   BMI 25.64 kg/m²   OB Status Postmenopausal   Smoking Status Never   BSA 1.79 m²        Intake/Output Summary (Last 24 hours) at 7/8/2024 1343  Last data filed at 7/8/2024 1244  Gross per 24 hour   Intake 1110 ml   Output 2000 ml   Net -890 ml       Labs:   Results from last 72 hours   Lab Units 07/08/24  0607 07/07/24  0555 07/06/24  0613   SODIUM mmol/L 142 141 144   POTASSIUM mmol/L 4.3 4.0 4.3   CHLORIDE mmol/L 106 106 109*   CO2 mmol/L 29 27 24   BUN mg/dL 16 14 13   CREATININE mg/dL 0.60 0.60 0.70   GLUCOSE mg/dL 89 96 93   CALCIUM mg/dL 8.6 8.5* 8.9   ANION GAP mmol/L 11 12 15   EGFR mL/min/1.73m*2 89 89 85   PHOSPHORUS mg/dL 3.8 3.3 3.6      Results from last 72 hours   Lab Units 07/08/24  0607 07/07/24  0555 07/06/24  0613   WBC AUTO x10*3/uL 8.9 9.7 12.0*   HEMOGLOBIN g/dL 11.7* 12.1 12.7   HEMATOCRIT % 37.1 37.8 41.2   PLATELETS AUTO x10*3/uL 206 198 212      Lab Results   Component Value Date    CALCIUM 8.6 07/08/2024    PHOS 3.8 07/08/2024      No results found for: \"CRP\"    " "[unfilled]     Micro/ID:   Susceptibility data from last 90 days.  Collected Specimen Info Organism Ampicillin Cefazolin Cefazolin (uncomplicated UTIs only) Ciprofloxacin Gentamicin Nitrofurantoin Piperacillin/Tazobactam Tobramycin Trimethoprim/Sulfamethoxazole   06/24/24 Urine from Clean Catch/Voided Escherichia coli  S  S  S  S  R  S  S  R  S                    No lab exists for component: \"AGALPCRNB\"     Lab Results   Component Value Date    URINECULTURE (A) 07/03/2024     Multiple organisms present, probable contamination. Repeat culture if clinically indicated.       Images    CT angio head and neck w and wo IV contrast    Result Date: 7/3/2024  Interpreted By:  Rupesh Del Rosario, STUDY: CT ANGIO HEAD AND NECK W AND WO IV CONTRAST;  7/3/2024 6:46 pm   INDICATION: Signs/Symptoms:Stroke rule out   COMPARISON: 07/03/2024 CT head without contrast, 07/03/2024 MRI brain   ACCESSION NUMBER(S): [id number]   ORDERING CLINICIAN: MARÍA DREW   TECHNIQUE: Multiple contiguous axial noncontrast images of the head were obtained. Following IV contrast administration of iodinated contrast, a CT angiography of the head and neck was performed. MIPS and 3D reconstructions of the [place] of [place] and neck were created on an independent workstation and reviewed.   FINDINGS: CTA NECK:       LEFT VERTEBRAL ARTERY:  No hemodynamically significant stenosis, occlusion, or dissection.   LEFT COMMON/INTERNAL CAROTID ARTERY: Minimal calcific atherosclerosis at the bulb. No hemodynamically significant stenosis, occlusion, or dissection.   RIGHT VERTEBRAL ARTERY: Non dominant. No hemodynamically significant stenosis, occlusion, or dissection.   RIGHT COMMON/INTERNAL CAROTID ARTERY: Mild calcified atherosclerosis of the distal CCA and carotid bulb. No hemodynamically significant stenosis, occlusion, or dissection.     The neck soft tissues show no evidence of mass, fluid collection, or enlarged lymph nodes. Multinodular goiter. There " is no acute osseous abnormality. Multilevel degenerative changes of the cervical spine with moderate canal stenosis at C5-C6 and mild canal stenosis at C6-C7 due to disc spur complexes. There is also multilevel foraminal stenosis that is notably severe bilaterally at C3-C4, moderate-severe bilaterally at C4-C5, severe bilaterally at C5-C6, and moderate on the left at C6-C7.       CTA HEAD:   ANTERIOR CIRCULATION: No aneurysm.   - Internal Carotid Arteries: Mild calcific atherosclerosis bilaterally. No hemodynamically significant stenosis or occlusion.   - Middle Cerebral Arteries:  No hemodynamically significant stenosis or occlusion.   - Anterior Cerebral Arteries: There are few areas of severe focal stenosis of the left pericallosal anterior cerebral artery. There is a single area of mild stenosis of the right pericallosal anterior cerebral artery.     POSTERIOR CIRCULATION: No aneurysm.   - Intracranial Vertebral Arteries: Minimal calcific atherosclerosis bilaterally. No hemodynamically significant stenosis or occlusion. The PICA are patent bilaterally.   - Basilar Artery:  No hemodynamically significant stenosis or occlusion.   - Posterior Cerebral Arteries: There is a focal severe stenosis of the right calcarine artery. Otherwise, no hemodynamically significant stenosis or occlusion.   No arteriovenous malformation is visualized. No pathologic intracranial enhancement or discrete mass. The dural venous sinuses are patent.   MIPS and 3D reconstructions confirm the above findings.       1. No large vessel intracranial or extracranial occlusion. 2. No hemodynamically significant stenosis in the neck. 3. 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 Several areas of severe focal stenosis in the left pericallosal anterior cerebral artery. 4. Multilevel cervical spondylosis resulting in high-grade foraminal stenosis at most levels as detailed above, and moderate canal stenosis at C5-C6.       MACRO: None.    Signed by: Rupesh Del Rosario 7/3/2024 7:18 PM Dictation workstation:   [id number]    MR brain wo IV contrast    Result Date: 7/3/2024  Interpreted By:  Arnol Gonzales, STUDY: MR BRAIN WO IV CONTRAST; 7/3/2024 5:15 pm   INDICATION: Signs/Symptoms:Stroke like symptoms;   COMPARISON: CT brain 07/30/2024   ACCESSION NUMBER(S): [id number]   ORDERING CLINICIAN: MARÍA DREW   TECHNIQUE: Routine brain MRI protocol without  contrast including diffusion and gradient echo images. Incomplete acquired sequences due to patient's inability to remain motion less.   FINDINGS: RESULT:   Examination is severely degraded by motion. Incomplete acquisition of sequences due to patient's inability to remain immobile. DWI images within the limits of evaluation, demonstrates no acute abnormality.   For aspirin echo T2 with a sequence, demonstrate no acute intracranial abnormality. Patchy and confluent foci of hyperintense signal on T2 weighted and FLAIR images are noted involving the supratentorial white matter are nonspecific but likely represent moderate microvascular ischemia. Sequela of multiple prior supratentorial infarcts. Moderate generalized volume loss with concordant ventriculomegaly.       Limited examination due to incomplete sequences. Within the limits of evaluation as described above, no acute intracranial abnormality.   Signed by: Arnol Gonzales 7/3/2024 5:33 PM Dictation workstation:   [id number]    Transthoracic Echo (TTE) Complete    Result Date: 7/3/2024   [facility], [address] [place] [address]               Tel [phone] and Fax [phone] TRANSTHORACIC ECHOCARDIOGRAM REPORT  Patient Name:      RYAN MCCABE   Reading Physician:    [id number] Nancy Quezada MD Study Date:        7/3/2024            Ordering Provider:    [id number] MARÍA DREW MRN/PID:            [id number]            Fellow: Accession#:        [id number]        Nurse:                Elissa Kidd RN Date of Birth/Age: 1940 / 84      Sonographer:          Luma leblanc                                     SHELBY Gender:            F                   Additional Staff: Height:            165.10 cm           Admit Date:           7/3/2024 Weight:            69.85 kg            Admission Status:     Observation -                                                              Routine BSA / BMI:         1.77 m2 / 25.63     Encounter#:           [id number]                    kg/m2 Blood Pressure:    134/82 mmHg         Department Location:  [facility] Non                                                              Invasive Study Type:    TRANSTHORACIC ECHO (TTE) COMPLETE Diagnosis/ICD: Syncope and collapse-R55 Indication:    Syncope, Falls CPT Code:      Echo Complete w Full Doppler-[id number] Patient History: Pertinent History: TIA and Falls. Study Detail: The following Echo studies were performed: 2D, M-Mode, Doppler and               color flow. Technically challenging study due to Pt movement               throughout study. Agitated saline used as a contrast agent for               intraseptal flow evaluation. The patient was awake.  PHYSICIAN INTERPRETATION: Left Ventricle: The left ventricular systolic function is low normal, with a visually estimated ejection fraction of 50-55%. The left ventricular cavity size is normal. Spectral Doppler shows an impaired relaxation pattern of left ventricular diastolic filling. Left Atrium: The left atrium is normal in size. A bubble study using agitated saline was performed. Bubble study is negative. Right Ventricle: The right ventricle is normal in size. There is normal right ventricular global systolic function. Right Atrium: The right atrium is normal in size. Aortic Valve:  The aortic valve was not well visualized. There is no evidence of aortic valve stenosis. The aortic valve dimensionless index is 0.51. There is no evidence of aortic valve regurgitation. The peak instantaneous gradient of the aortic valve is 9.8 mmHg. The mean gradient of the aortic valve is 5.4 mmHg. Mitral Valve: The mitral valve is mild to moderately thickened. There is mild mitral valve regurgitation. Tricuspid Valve: The tricuspid valve was not well visualized. There is trace to mild tricuspid regurgitation. The right ventricular systolic pressure is unable to be estimated. Pulmonic Valve: The pulmonic valve is not well visualized. There is physiologic pulmonic valve regurgitation. Pericardium: There is no pericardial effusion noted. There is a pericardial fat pad present. Aorta: The aortic root was not well visualized. Systemic Veins: The inferior vena cava size appears small. There is IVC inspiratory collapse greater than 50%. In comparison to the previous echocardiogram(s): There are no prior studies on this patient for comparison purposes.  CONCLUSIONS:  1. The left ventricular systolic function is low normal, with a visually estimated ejection fraction of 50-55%.  2. Spectral Doppler shows an impaired relaxation pattern of left ventricular diastolic filling.  3. There is normal right ventricular global systolic function.  4. Aortic valve stenosis is not present. QUANTITATIVE DATA SUMMARY: 2D MEASUREMENTS:                          Normal Ranges: IVSd:          1.13 cm   (0.6-1.1cm) LVPWd:         0.56 cm   (0.6-1.1cm) LVIDd:         5.00 cm   (3.9-5.9cm) LVIDs:         3.58 cm LV Mass Index: 82.2 g/m2 LV % FS        28.4 % LA VOLUME:                               Normal Ranges: LA Vol A4C:        23.3 ml    (22+/-6mL/m2) LA Vol A2C:        43.0 ml LA Vol BP:         31.6 ml LA Vol Index A4C:  13.1 ml/m2 LA Vol Index A2C:  24.3 ml/m2 LA Vol Index BP:   17.9 ml/m2 LA Area A4C:       11.7 cm2 LA Area A2C:        15.9 cm2 LA Major Axis A4C: 5.0 cm LA Major Axis A2C: 5.0 cm LA Volume Index:   17.7 ml/m2 LA Vol A4C:        20.4 ml LA Vol A2C:        40.5 ml RA VOLUME BY A/L METHOD:                       Normal Ranges: RA Area A4C: 10.6 cm2 LV SYSTOLIC FUNCTION BY 2D PLANIMETRY (MOD):                      Normal Ranges: EF-A4C View:    47 % (>=55%) EF-A2C View:    39 % EF-Biplane:     42 % EF-Visual:      53 % LV EF Reported: 53 % LV DIASTOLIC FUNCTION:                          Normal Ranges: MV Peak E:    0.67 m/s   (0.7-1.2 m/s) MV Peak A:    0.98 m/s   (0.42-0.7 m/s) E/A Ratio:    0.68       (1.0-2.2) MV e'         0.065 m/s  (>8.0) MV lateral e' 0.07 m/s MV medial e'  0.06 m/s MV A Dur:     77.07 msec E/e' Ratio:   10.27      (<8.0) MITRAL VALVE:                 Normal Ranges: MV DT: 148 msec (150-240msec) AORTIC VALVE:                                   Normal Ranges: AoV Vmax:                1.57 m/s (<=1.7m/s) AoV Peak P.8 mmHg (<20mmHg) AoV Mean P.4 mmHg (1.7-11.5mmHg) LVOT Max Tom:            0.93 m/s (<=1.1m/s) AoV VTI:                 31.68 cm (18-25cm) LVOT VTI:                16.08 cm LVOT Diameter:           1.90 cm  (1.8-2.4cm) AoV Area, VTI:           1.44 cm2 (2.5-5.5cm2) AoV Area,Vmax:           1.69 cm2 (2.5-4.5cm2) AoV Dimensionless Index: 0.51  RIGHT VENTRICLE: RV Basal 2.50 cm RV Mid   1.50 cm RV Major 7.5 cm RV s'    0.13 m/s TRICUSPID VALVE/RVSP:                            Normal Ranges: Peak TR Velocity: 0.99 m/s RV Syst Pressure: 6.9 mmHg (< 30mmHg) IVC Diam:         0.90 cm PULMONIC VALVE:                      Normal Ranges: PV Max Tom: 0.7 m/s  (0.6-0.9m/s) PV Max P.9 mmHg AORTA: Asc Ao Diam 4.00 cm  [id number] Nancy Quezada MD Electronically signed on 7/3/2024 at 3:50:57 PM  ** Final **     ECG 12 lead    Result Date: 7/3/2024  Sinus rhythm with occasional Premature ventricular complexes Possible Left atrial enlargement Anterior infarct (cited on or before  24-JUN-2024) Abnormal ECG When compared with ECG of 24-JUN-2024 15:25, Premature ventricular complexes are now Present QRS axis Shifted left    XR humerus left    Result Date: 7/3/2024  Interpreted By:  Katie Peralta, STUDY: XR HUMERUS LEFT; ;  7/3/2024 12:17 pm   INDICATION: Signs/Symptoms:L arm injury.   COMPARISON: None.   ACCESSION NUMBER(S): [id number]   ORDERING CLINICIAN: ERON BURGESS   FINDINGS: No acute fracture or dislocation. Calcific tendinosis of the rotator cuff.       No acute osseous abnormality.   Signed by: Katie Peralta 7/3/2024 12:32 PM Dictation workstation:   [id number]    CT head wo IV contrast    Result Date: 7/3/2024  Interpreted By:  Samantha Merritt, STUDY: CT HEAD WO IV CONTRAST;  7/3/2024 11:21 am   INDICATION: Signs/Symptoms:fall head injury.   COMPARISON: 06/24/2024   ACCESSION NUMBER(S): [id number]   ORDERING CLINICIAN: ERON BURGESS   TECHNIQUE: Examination was performed in the axial plane using soft tissue and bone algorithm.   FINDINGS: INTRACRANIAL: There is prominence of the ventricular system and cerebral sulci consistent with cerebral atrophy. There are periventricular hypodensities consistent with  marked small vessel disease. No mass or mass effect is identified. There is no hemorrhage or subdural fluid collection. There is no acute infarct. There are small remote bilateral thalamic infarcts, seen previously. There is no fracture of the calvarium.   EXTRACRANIAL: Visualized paranasal sinuses and mastoids are clear.       No acute intracranial pathology.   MACRO: None   Signed by: Samantha Merritt 7/3/2024 11:45 AM Dictation workstation:   [id number]    ECG 12 lead    Result Date: 6/28/2024  Normal sinus rhythm Possible Anterior infarct , age undetermined Abnormal ECG When compared with ECG of 17-JAN-2012 08:10, Borderline criteria for Anterior infarct are now Present Nonspecific T wave abnormality now evident in Anterior leads See ED provider note for full  interpretation and clinical correlation Confirmed by Mariya Carter ([id number]) on 6/28/2024 2:08:38 PM    CT head wo IV contrast    Result Date: 6/24/2024  Interpreted By:  Syed Carlin, STUDY: CT HEAD WO IV CONTRAST;  6/24/2024 3:50 pm   INDICATION: Signs/Symptoms:TIA earlier in the week.   COMPARISON: None.   ACCESSION NUMBER(S): [id number]   ORDERING CLINICIAN: AUBREY CARIAS   TECHNIQUE: Noncontrast axial CT scan of head was performed. Angled reformats in brain and bone windows were generated. The images were reviewed in bone, brain, blood and soft tissue windows.   FINDINGS: There is no intra/extra-axial fluid collection, mass effect, or midline shift. The gray/white matter junction is preserved. Hypoattenuation of periventricular and subcortical white matter suggestive of chronic small vessel ischemic disease. Tiny old lacunar infarct is noted in the centrum semiovale abutting the body of the left lateral ventricle. Minimal diffuse parenchymal volume loss is seen the basal cisterns are patent. Visualized paranasal sinuses and mastoid air cells are clear. The calvarium is intact.       No acute intracranial finding. MRI may be obtained if clinically indicated.   Chronic nonemergent findings as above   Signed by: Syed Carlin 6/24/2024 4:16 PM Dictation workstation:   [id number]      Assessment and Plan    Sol Gomez is a 84 y.o. female admitted on 7/3/2024       ACUTE MEDICAL ISSUES:  ACUTE MEDICAL ISSUES:  #Reoccurring PVCs with tachycardia  #Syncopal Episode  #Falls  - Patient experienced has been experiencing frequent falls with a syncopal episode 3 days prior to admission  - Patient heart rate has stabilized  - Echo 7/3/24 showed EF 50-55%  Plan:  - Cardiology consulted and recommended 25 mg metoprolol tartrate BID and Zio patch and EP study after discharge  - Metoprolol Tartrate 25 mg BID     # C/f Autoimmune Encephalitis   - Pt presenting with L UE irregular movements   - CT head wo con showed  chronic L basal ganglia lacunar stroke   - Neuro consulted, appreciate recs   - Pt refused Aspirin 81mg d/t epistaxis   - Atorvastatin 80 mg daily   - MRI brain wo contrast limited d/t movement   - MRA head/neck showed focal severe stenosis in right calcarine & left pericallosal ant cerebral arteries, focal high-grade cervical foraminal stenosis   Plan:  - Anti NMDAR, GABAa, GABAb, AMPA and glycine receptors, AQP4, voltage-gated potassium channels ordered --> If (+) may need to start IVIG   - May follow-up for neurology labs outpatient  - Continue Gabapentin 300mg TID   - PT evaluated the patient and recommended Moderate intensity level of continued care upon discharge     # Symptomatic Hyponatremia  # Hx Diabetes Insipidus  # Likely SIADH  - Na 125 on admission (baseline wnl)    - Could be d/t desmopressin use for DI  - Urine electrolytes consistent with SIADH  - Current Na 141, Urinary output>2L overnight  Plan:  - Nephrology consulted, urine electrolytes consisted with SIADH, recommended discontinuing fluid restriction and I.V fluids and decreasing dose of Desmopressin to 0.05 mg BID, appreciate reccs  - Hold home Desmopressin 0.1mg BID  - Initiate Desmopressin 0.05 mg BID  - Trend RFP every 6 hrs   - Recommend outpatient follow-up with Nephrology      ADDRESSED/ RESOLVED MEDICAL ISSUES:  # Acute Metabolic Encephalopathy   2/2 UTI vs Hyponatremia   - Pt no longer requiring soft wrist restraints, discontinued  - Patient completed a previous course of Cephalexin and 2 days of IV Rocephin, will discontinue Rocephin  -Patient has returned to baseline      CHRONIC MEDICAL ISSUES:  #Carpal Tunnel Syndrome-Pain Medication PRN  #HTN-Held Amlodipine for CVA workup. Hydralazine and Labatelol PRN for SBP>220         Fluids: Received 1 L LR bolus  Electrolytes: Replete PRN  Nutrition: Adult Regular Diet  Antimicrobials: None  DVT ppx: Lovenox  GI ppx: None  Lines: PIV  Supplemental Oxygen: RA  Emergency Contact: Extended  Emergency Contact Information  Primary Emergency Contact: Mellissa Vee  Home Phone: [phone]  Relation: Child   Code: Full Code     Disposition: Pending transfer to [facility]      Bismark Guerrero DO  Internal Medicine, PGY- 1  07/08/24 at 1:43 PM

## 2024-07-08 NOTE — DISCHARGE SUMMARY
Discharge Diagnosis  Acute metabolic encephalopathy    Issues Requiring Follow-Up  Chronic Diabetes Insipidus  Hyponatremia  Frequent Falls  Discharge Meds     Your medication list        START taking these medications        Instructions Last Dose Given Next Dose Due   atorvastatin 80 mg tablet  Commonly known as: Lipitor      Take 1 tablet (80 mg) by mouth once daily at bedtime.       gabapentin 300 mg capsule  Commonly known as: Neurontin      Take 1 capsule (300 mg) by mouth 3 times a day.       metoprolol tartrate 25 mg tablet  Commonly known as: Lopressor      Take 1 tablet (25 mg) by mouth 2 times a day.              CHANGE how you take these medications        Instructions Last Dose Given Next Dose Due   desmopressin 0.1 mg tablet  Commonly known as: DDAVP  What changed: how much to take      Take 0.5 tablets (0.05 mg) by mouth 2 times a day.              CONTINUE taking these medications        Instructions Last Dose Given Next Dose Due   TylenoL 325 mg tablet  Generic drug: acetaminophen                  STOP taking these medications      cephalexin 500 mg capsule  Commonly known as: Keflex                  Where to Get Your Medications        Information about where to get these medications is not yet available    Ask your nurse or doctor about these medications  atorvastatin 80 mg tablet  desmopressin 0.1 mg tablet  gabapentin 300 mg capsule  metoprolol tartrate 25 mg tablet         Test Results Pending At Discharge  Pending Labs       Order Current Status    Amino Acids, Plasma by LC-MS/MS In process    GABAa, GABAb, AMPA; [facility]; [id number], [id number] - Miscellaneous Test In process    NMDA Receptor Autoantibody Test In process    NMDA Receptor Autoantibody Test In process    NMO/AQP4-IgG, Serum In process    Voltage-Gated Potassium Channel (VGKC) Antibody In process        Brief HPI  Sol RamosQuinn is a 84 y.o. year old female patient on Hospital Day: 6 with pmh of multiple CVAs, Diabetes  Insipidus, carpal tunnel syndrome, and HTN who presented to the hospital for multiple falls over the past 3 days and a burning sensation in her left upper extremity.     Hospital Course  The patient presented to the ED for multiple falls over the past 3 days, a syncopal episode on Sunday, increased slurring of speech, and left arm numbness with a burning sensation. The patient's daughter is her power of  and was present at bedside. Upon initial assessment in the ED, the patient's vitals were stable. She had an NIH of 0. A CT head wo contrast indicated no acute process. An xray of the left humerus indicated no acute fracture or dislocation and calcific tendinosis of the rotator cuff. The labs in the ED were pertinent for a sodium level of 125. Troponins and WBC were unremarkable. The ECG indicated sinus rhythm with occasional PVCs. The patient's urinalysis indicated a UTI with positive leukocyte esterace. Urine cultures were taken. The patient was started on Rocephin and transferred to the general floor.    Upon arriving to the general floor, patient received an Echo, which indicated an EF of 55-60% with impaired left ventricular diastolic filling. An MRI brain was attempted, but examination was limited due to patient mobility. Within the limits of evaluation, their were no signs of acute intracranial abnormality. The patient needed to be placed in soft wrist restraints because of an inability to administer medication and for patient safety. Doctor saw and evaluated patient at bedside for restraint placement. The patient was started on a normal saline infusion at 75 ml/hr. Overnight, her sodium increased to 138. Fluid was then discontinued and the patient was placed on 1500ml fluid restriction. Her sodium continued to increase to 145. Nephrology was consulted and recommended continuing her home desmopressin at 0.05 mg BID and removing the fluid restriction.  Neurology was consulted and recommended  initializing Gabapentin and obtaining autoimmune antibody labs, which could be followed-up outpatient.  The patient continued to experience PVCs with tachycardia. Cardiology was consulted and the patient was initiated on Metoprolol Tartrate, and vitals became stable. PT evaluated the patient and recommended moderate intensity level of continued care. She received one liter of lactated ringers for dehydration on the day of discharge. Patient was stable and ready for discharge on 07/08/2024.    Discharge  Patient will be discharged with the following prescriptions to SNF:  -Continue Atorvastatin 80 mg daily at bedtime  -Continue Gabapentin 300 mg three times a day  -Continue Metoprolol Tartrate 25 mg two times a day  -Reduce Desmopressin dose to 0.05 mg two times a day   -Continue Tylenol 325 mg every 4 hours if needed for pain  -Follow-up with out-patient nephrologist  -Receive a Z[facility] patch from [facility] on 07/24/2024 at 11 am    Pertinent Physical Exam At Time of Discharge  Physical Exam  Constitutional:       General: She is not in acute distress.     Appearance: Normal appearance. She is normal weight. She is not ill-appearing.   HENT:      Head: Normocephalic and atraumatic.   Eyes:      Extraocular Movements: Extraocular movements intact.      Conjunctiva/sclera: Conjunctivae normal.   Cardiovascular:      Rate and Rhythm: Normal rate and regular rhythm.   Pulmonary:      Effort: Pulmonary effort is normal. No respiratory distress.      Breath sounds: Normal breath sounds. No stridor. No rhonchi.   Abdominal:      General: Abdomen is flat. There is no distension.      Palpations: Abdomen is soft.   Musculoskeletal:         General: Normal range of motion.   Skin:     General: Skin is warm and dry.   Neurological:      General: No focal deficit present.      Mental Status: She is alert and oriented to person, place, and time.      Cranial Nerves: No cranial nerve deficit.      Sensory: No sensory deficit.          Outpatient Follow-Up  Future Appointments   Date Time Provider Department Center   7/12/2024  4:00 PM [facility]   7/12/2024  4:15 PM [facility]   7/12/2024  4:30 PM [facility]   7/15/2024  2:30 PM Ajit Lee MD [address]   7/24/2024 11:00 AM [facility]   8/19/2024 11:00 AM Jean Carlos Wayne MD [facility]         Bismark Guerrero DO

## 2024-07-09 ENCOUNTER — APPOINTMENT (OUTPATIENT)
Dept: ORTHOPEDIC SURGERY | Facility: CLINIC | Age: 84
End: 2024-07-09
Payer: MEDICARE

## 2024-07-09 DIAGNOSIS — M25.532 LEFT WRIST PAIN: ICD-10-CM

## 2024-07-10 ENCOUNTER — LAB REQUISITION (OUTPATIENT)
Dept: LAB | Facility: HOSPITAL | Age: 84
End: 2024-07-10

## 2024-07-10 ENCOUNTER — NURSING HOME VISIT (OUTPATIENT)
Dept: POST ACUTE CARE | Facility: EXTERNAL LOCATION | Age: 84
End: 2024-07-10
Payer: MEDICARE

## 2024-07-10 DIAGNOSIS — E61.2 MAGNESIUM DEFICIENCY: ICD-10-CM

## 2024-07-10 DIAGNOSIS — R55 SYNCOPE AND COLLAPSE: ICD-10-CM

## 2024-07-10 DIAGNOSIS — Z86.73 HISTORY OF CVA (CEREBROVASCULAR ACCIDENT): ICD-10-CM

## 2024-07-10 DIAGNOSIS — R25.1 TREMOR: ICD-10-CM

## 2024-07-10 DIAGNOSIS — E23.2 DIABETES INSIPIDUS (MULTI): ICD-10-CM

## 2024-07-10 DIAGNOSIS — I48.91 ATRIAL FIBRILLATION, UNSPECIFIED TYPE (MULTI): ICD-10-CM

## 2024-07-10 DIAGNOSIS — I10 ESSENTIAL HYPERTENSION: ICD-10-CM

## 2024-07-10 DIAGNOSIS — M62.81 MUSCLE WEAKNESS (GENERALIZED): Primary | ICD-10-CM

## 2024-07-10 DIAGNOSIS — G56.00 CARPAL TUNNEL SYNDROME, UNSPECIFIED LATERALITY: ICD-10-CM

## 2024-07-10 DIAGNOSIS — E87.1 HYPO-OSMOLALITY AND HYPONATREMIA: ICD-10-CM

## 2024-07-10 LAB
ANION GAP SERPL CALC-SCNC: 13 MMOL/L (ref 10–20)
BUN SERPL-MCNC: 19 MG/DL (ref 6–23)
CALCIUM SERPL-MCNC: 8.6 MG/DL (ref 8.6–10.3)
CHLORIDE SERPL-SCNC: 104 MMOL/L (ref 98–107)
CO2 SERPL-SCNC: 29 MMOL/L (ref 21–32)
CREAT SERPL-MCNC: 0.6 MG/DL (ref 0.5–1.05)
EGFRCR SERPLBLD CKD-EPI 2021: 89 ML/MIN/1.73M*2
ERYTHROCYTE [DISTWIDTH] IN BLOOD BY AUTOMATED COUNT: 13.3 % (ref 11.5–14.5)
GLUCOSE SERPL-MCNC: 82 MG/DL (ref 74–99)
HCT VFR BLD AUTO: 36.9 % (ref 36–46)
HGB BLD-MCNC: 11.6 G/DL (ref 12–16)
MCH RBC QN AUTO: 29.3 PG (ref 26–34)
MCHC RBC AUTO-ENTMCNC: 31.4 G/DL (ref 32–36)
MCV RBC AUTO: 93 FL (ref 80–100)
NRBC BLD-RTO: 0 /100 WBCS (ref 0–0)
PLATELET # BLD AUTO: 234 X10*3/UL (ref 150–450)
POTASSIUM SERPL-SCNC: 4.5 MMOL/L (ref 3.5–5.3)
RBC # BLD AUTO: 3.96 X10*6/UL (ref 4–5.2)
SODIUM SERPL-SCNC: 141 MMOL/L (ref 136–145)
WBC # BLD AUTO: 8.9 X10*3/UL (ref 4.4–11.3)

## 2024-07-10 PROCEDURE — 85027 COMPLETE CBC AUTOMATED: CPT | Mod: OUT | Performed by: FAMILY MEDICINE

## 2024-07-10 PROCEDURE — 80048 BASIC METABOLIC PNL TOTAL CA: CPT | Mod: OUT | Performed by: FAMILY MEDICINE

## 2024-07-10 PROCEDURE — 99310 SBSQ NF CARE HIGH MDM 45: CPT | Performed by: NURSE PRACTITIONER

## 2024-07-11 ENCOUNTER — NURSING HOME VISIT (OUTPATIENT)
Dept: POST ACUTE CARE | Facility: EXTERNAL LOCATION | Age: 84
End: 2024-07-11
Payer: MEDICARE

## 2024-07-11 DIAGNOSIS — E23.2 DIABETES INSIPIDUS (MULTI): ICD-10-CM

## 2024-07-11 DIAGNOSIS — I10 ESSENTIAL HYPERTENSION: ICD-10-CM

## 2024-07-11 LAB
ATRIAL RATE: 78 BPM
P AXIS: 52 DEGREES
P OFFSET: 211 MS
P ONSET: 156 MS
PR INTERVAL: 140 MS
Q ONSET: 226 MS
QRS COUNT: 13 BEATS
QRS DURATION: 70 MS
QT INTERVAL: 414 MS
QTC CALCULATION(BAZETT): 471 MS
QTC FREDERICIA: 451 MS
R AXIS: -25 DEGREES
T AXIS: 31 DEGREES
T OFFSET: 433 MS
VENTRICULAR RATE: 78 BPM

## 2024-07-11 PROCEDURE — 99305 1ST NF CARE MODERATE MDM 35: CPT | Performed by: FAMILY MEDICINE

## 2024-07-11 NOTE — LETTER
Patient: Sol Gomez  : 1940    Encounter Date: 2024    Sol Gomez is a 84 y.o. female with Chief Complaint of SNF ([facility]) H&P    Resident seen 24 -- PAM    CC: SNF ([facility]) H&P    : 1940  SNF H&P done 24  DC SUmmary dated 24 reviewed 7/15/24  Allergy: ASA, Propoxyphene, Salicylates, Trees  FULL CODE    S: 85 yo woman with hx of multiple CVAs, DI, and HTN admitted to SNF rehab after hospitalization for acute syncope and hyponatremia. No CP/SOB. MEd list & problem list reviewed .    O: VSS AFEB 159# awake, alert, NAD. CHest cta. Heart rrr. Ext no c/c/e.    LAB (7/10/24) Na 141, K 4.5, Cr 0.6, alb 3.3.    A/P:  # Weakness/Syncope: SNF PT/OT  # HTN: Lopressor 25 bid  # FEN: MgO 400 mg bid, Na stable on desmopressin.  # Neuropathy: gabapentin 300 mg tid  # HLD: statin  # DI: desmopressin acetate 0.1 mg 1/2 tablet bid.      Past Surgical History:   Procedure Laterality Date   • MR HEAD ANGIO WO IV CONTRAST  7/15/2012    MR HEAD ANGIO WO IV CONTRAST [facility]   • MR HEAD ANGIO WO IV CONTRAST  2013    MR HEAD ANGIO WO IV CONTRAST [facility]      Social History     Socioeconomic History   • Marital status:      Spouse name: Not on file   • Number of children: Not on file   • Years of education: Not on file   • Highest education level: Not on file   Occupational History   • Not on file   Tobacco Use   • Smoking status: Never   • Smokeless tobacco: Never   Vaping Use   • Vaping status: Never Used   Substance and Sexual Activity   • Alcohol use: Not Currently     Alcohol/week: 1.0 standard drink of alcohol     Types: 1 Glasses of wine per week   • Drug use: Never   • Sexual activity: Not Currently   Other Topics Concern   • Not on file   Social History Narrative   • Not on file     Social Determinants of Health     Financial Resource Strain: Low Risk  (2024)    Overall Financial Resource Strain (CARDIA)    • Difficulty of Paying Living  Expenses: Not hard at all   Food Insecurity: No Food Insecurity (9/13/2022)    Received from [facility]    Hunger Vital Sign    • Worried About Running Out of Food in the Last Year: Never true    • Ran Out of Food in the Last Year: Never true   Transportation Needs: No Transportation Needs (7/4/2024)    PRAPARE - Transportation    • Lack of Transportation (Medical): No    • Lack of Transportation (Non-Medical): No   Physical Activity: Insufficiently Active (9/13/2022)    Received from [facility]    Exercise Vital Sign    • Days of Exercise per Week: 2 days    • Minutes of Exercise per Session: 30 min   Stress: No Stress Concern Present (9/13/2022)    Received from [facility]    [employer] - Occupational Stress Questionnaire    • Feeling of Stress : Not at all   Social Connections: Moderately Isolated (9/13/2022)    Received from [facility]    Social Connection and Isolation Panel [NHANES]    • Frequency of Communication with Friends and Family: More than three times a week    • Frequency of Social Gatherings with Friends and Family: Twice a week    • Attends Rastafarian Services: More than 4 times per year    • Active Member of Clubs or Organizations: No    • Attends Club or Organization Meetings: Never    • Marital Status:    Intimate Partner Violence: Not on file   Housing Stability: Low Risk  (7/4/2024)    Housing Stability Vital Sign    • Unable to Pay for Housing in the Last Year: No    • Number of Places Lived in the Last Year: 1    • Unstable Housing in the Last Year: No     Past Medical History:   Diagnosis Date   • Atrial fibrillation (Multi) 06/28/2024   • CVA (cerebral vascular accident) (Multi) 12/03/2010   • Essential hypertension 06/28/2024   • Syncope and collapse 06/28/2024      No family history on file.     Review of Systems   Constitutional:  Negative for chills, fatigue and fever.   HENT:  Negative for rhinorrhea and sore throat.    Eyes:   Negative for pain and redness.   Respiratory:  Negative for cough and shortness of breath.    Cardiovascular:  Negative for chest pain and palpitations.   Gastrointestinal:  Negative for abdominal pain and blood in stool.   Endocrine: Negative for polydipsia and polyuria.   Genitourinary:  Negative for dysuria and hematuria.   Musculoskeletal:  Negative for back pain and neck stiffness.   Skin:  Negative for rash and wound.   Allergic/Immunologic: Negative for environmental allergies and food allergies.   Neurological:  Positive for weakness. Negative for headaches.   Hematological:  Negative for adenopathy. Does not bruise/bleed easily.   Psychiatric/Behavioral:  Negative for hallucinations and suicidal ideas.       There were no vitals taken for this visit.  Physical Exam  Vitals reviewed.   Constitutional:       General: She is not in acute distress.     Appearance: She is not ill-appearing.   HENT:      Head: Normocephalic and atraumatic.      Right Ear: Tympanic membrane normal.      Left Ear: Tympanic membrane normal.      Nose: No congestion or rhinorrhea.      Mouth/Throat:      Pharynx: No oropharyngeal exudate or posterior oropharyngeal erythema.   Eyes:      Extraocular Movements: Extraocular movements intact.      Conjunctiva/sclera: Conjunctivae normal.      Pupils: Pupils are equal, round, and reactive to light.   Cardiovascular:      Rate and Rhythm: Normal rate and regular rhythm.      Heart sounds: No murmur heard.     No friction rub. No gallop.   Pulmonary:      Effort: Pulmonary effort is normal.      Breath sounds: Normal breath sounds. No wheezing, rhonchi or rales.   Abdominal:      General: There is no distension.      Palpations: Abdomen is soft.      Tenderness: There is no abdominal tenderness. There is no guarding or rebound.   Musculoskeletal:         General: No swelling or deformity.      Cervical back: Normal range of motion and neck supple.      Right lower leg: No edema.      Left  "lower leg: No edema.   Skin:     Capillary Refill: Capillary refill takes less than 2 seconds.      Coloration: Skin is not jaundiced.      Findings: No rash.   Neurological:      General: No focal deficit present.      Mental Status: She is alert.      Motor: No weakness.   Psychiatric:         Mood and Affect: Mood normal.         Behavior: Behavior normal.       Lab Results   Component Value Date    WBC 8.9 07/10/2024    HGB 11.6 (L) 07/10/2024    HCT 36.9 07/10/2024    MCV 93 07/10/2024     07/10/2024     Lab Results   Component Value Date    CHOL 201 (H) 06/27/2024    CHOL 224 (H) 10/02/2023     Lab Results   Component Value Date    HDL 62.7 06/27/2024    HDL 71.7 10/02/2023     Lab Results   Component Value Date    LDLCALC 115 (H) 06/27/2024    LDLCALC 133 (L) 10/02/2023     Lab Results   Component Value Date    TRIG 118 06/27/2024    TRIG 97 10/02/2023     No components found for: \"CHOLHDL\"  Lab Results   Component Value Date    HGBA1C 5.3 06/27/2024       Assessment/Plan  Problem List Items Addressed This Visit       Essential hypertension    Diabetes insipidus (Multi)       Electronically Signed By: Matt Nelson MD   7/23/24  8:24 AM  "

## 2024-07-12 ENCOUNTER — APPOINTMENT (OUTPATIENT)
Dept: RADIOLOGY | Facility: HOSPITAL | Age: 84
End: 2024-07-12
Payer: MEDICARE

## 2024-07-12 LAB
AQP4 H2O CHANNEL IGG SERPL QL: NEGATIVE
SCAN RESULT: NORMAL
VGKC AB SER-SCNC: 0 PMOL/L (ref 0–31)

## 2024-07-14 NOTE — PROGRESS NOTES
*Provider Impression*    Patient is a 84 year old female who is seen today for management of multiple medical problems       #Weakness / Syncope / CTS / Tremor - PT/OT, gabapentin 300mg TID, acetaminophen 650mg q4h PRN, schedule f/u w/ hand surgeon, schedule f/u w/ neurology Dr. Hernandez  #Diabetes insipidus - desmopressin 0.05mg BID, schedule f/u w/ nephrology, check renal panel weekly x2wk  #HTN / CVA / A-fib - atorvastatin 80mg QHS, metoprolol 25mg BID, Zio patch on 7/24, f/u w/ Dr. Molina 8/19  #Magnesium deficiency - add magnesium oxide 400mg BID  #ACP - Full Code  Follow up as needed      *Chief Complaint*     syncope    *History of Present Illness*    Patient is an 85 y/o female w/ PMH as below who presented to the hospital for multiple falls over the past 3 days, a syncopal episode on Sunday, increased slurring of speech, and left arm numbness with a burning sensation. Upon initial assessment in the ED, the patient's vitals were stable. She had an NIH of 0. A CT head wo contrast indicated no acute process. An xray of the left humerus indicated no acute fracture or dislocation and calcific tendinosis of the rotator cuff. The labs in the ED were pertinent for a sodium level of 125. Troponins and WBC were unremarkable. The ECG indicated sinus rhythm with occasional PVCs. The patient's urinalysis indicated a UTI with positive leukocyte esterace. Urine cultures were taken. The patient was started on Rocephin and transferred to the general floor.  Upon arriving to the general floor, patient received an Echo, which indicated an EF of 55-60% with impaired left ventricular diastolic filling. An MRI brain was attempted, but examination was limited due to patient mobility. Within the limits of evaluation, there were no signs of acute intracranial abnormality. The patient needed to be placed in soft wrist restraints because of an inability to administer medication and for patient safety. She was started on a normal  saline infusion at 75 ml/hr. Overnight, her sodium increased to 138. Fluid was then discontinued and the patient was placed on 1500ml fluid restriction. Her sodium continued to increase to 145. Nephrology was consulted and recommended continuing her home desmopressin at 0.05 mg BID and removing the fluid restriction.  Neurology was consulted and recommended initializing Gabapentin and obtaining autoimmune antibody labs, which could be followed-up outpatient.  The patient continued to experience PVCs with tachycardia. Cardiology was consulted and the patient was initiated on Metoprolol Tartrate, and vitals became stable. PT evaluated the patient and recommended moderate intensity level of continued care. She received one liter of lactated ringers for dehydration. She was determined to be stable and was discharged to [facility] on 7/8.    Her labs seen today. Per nursing staff she would like to address carpel tunnel to left wrist. Family wants her back on full dose of Desmopression as sodium is back to normal. They want to know why her tremors increased. They want her on magnesium - family supplied.    She is seen sitting up in her room - with regard to her CTS, she has brace that helps, worse for the past few weeks, for 2 years hands have been numb, no CP, SOB, cough, HA, dizziness, n/v, constipation, diarrhea, or any other new c/o presently.     Allergies - Aspirin, Propoxyphene, Salicylates, Tree pollen   PMH - A-fib, HTN, CVA, diabetes insipidus, carpal tunnel syndrome  PSH - knee injection  FH - none reported  SocHx - Never smoker, No EtOH    *Review of Systems*  All other systems reviewed are negative except as noted in the HPI     *Vital Signs*   Date: 7/10/24  - T: 97.4  P: 69  R: 16  BP: 116/75  SpO2: 97% on RA     *Results / Data*  CBC - Date: 7/10/24  WBC: 8.9  HGB: 11.6  HCT: 36.9  PLT: 234  ;   BMP - Date: 7/10/24  Na: 141  K: 4.5  Cl: 104  Bicarb: 29  BUN: 19  Cr: 0.60  Glu: 82  Ca: 8.6  ;   LFT -  Date:   AST:   ALT:   ALP:   Tbili:   ;   Coags - Date:   INR:   PT:  Other - Date: 24  Ma.03    *Physical Exam*  Gen: (+) NAD, (+) well-appearing  HEENT: (+) normocephalic, (+) MMM  Neck: (+) supple  Lungs: (+) CTAB, (-) wheezes, (-) rales, (-) rhonchi  Heart: (+) irreg irreg, (-) murmurs  Pulses: (+) palpable  Abd: (+) soft, (+) NT, (+) ND, (+) BS+  Ext: (-) edema, (-) deformity  MSK: (-) joint swelling  Skin: (+) warm, (+) dry, (-) rash  Neuro: (+) follows commands, (+) tremor, (+) alert

## 2024-07-15 ENCOUNTER — APPOINTMENT (OUTPATIENT)
Dept: CARDIOLOGY | Facility: HOSPITAL | Age: 84
End: 2024-07-15
Payer: MEDICARE

## 2024-07-15 ENCOUNTER — APPOINTMENT (OUTPATIENT)
Dept: OTOLARYNGOLOGY | Facility: CLINIC | Age: 84
End: 2024-07-15
Payer: MEDICARE

## 2024-07-15 ENCOUNTER — NURSING HOME VISIT (OUTPATIENT)
Dept: POST ACUTE CARE | Facility: EXTERNAL LOCATION | Age: 84
End: 2024-07-15

## 2024-07-15 DIAGNOSIS — E23.2 DIABETES INSIPIDUS (MULTI): ICD-10-CM

## 2024-07-15 DIAGNOSIS — I10 ESSENTIAL HYPERTENSION: ICD-10-CM

## 2024-07-15 LAB
(HCYS)2 SERPL QL: <5 UMOL/L
A-AMINOBUTYR SERPL QL: 34.6 UMOL/L
AAA SERPL-SCNC: 5 UMOL/L
ALANINE SERPL-SCNC: 542.2 UMOL/L (ref 160–530)
ALLOISOLEUCINE SERPL QL: <5 UMOL/L
ANSERINE SERPL-SCNC: <20 UMOL/L
ARGININE SERPL-SCNC: 115.5 UMOL/L (ref 35–125)
ARGININOSUCCINATE SERPL-SCNC: <20 UMOL/L
ASPARAGINE/CREAT UR-RTO: 73.8 UMOL/L (ref 20–80)
ASPARTATE SERPL-SCNC: <5 UMOL/L
B-AIB SERPL-SCNC: 5.4 UMOL/L
B-ALANINE SERPL-SCNC: 8.6 UMOL/L
CITRULLINE SERPL-SCNC: 33.3 UMOL/L (ref 10–45)
CYSTATHIONIN SERPL-SCNC: <5 UMOL/L
CYSTINE SERPL-SCNC: 83.6 UMOL/L (ref 10–65)
ETHANOLAMINE SERPL-SCNC: 8.4 UMOL/L
GABA SERPL-SCNC: <5 UMOL/L
GLUTAMATE SERPL-SCNC: 22.7 UMOL/L (ref 15–130)
GLUTAMINE SERPL-SCNC: 820.7 UMOL/L (ref 380–680)
GLYCINE SERPL-SCNC: 316 UMOL/L (ref 140–420)
HISTIDINE SERPL-SCNC: 63.8 UMOL/L (ref 50–130)
HOMOCITRULLINE SERPL-SCNC: <5 UMOL/L
ISOLEUCINE SERPL-SCNC: 66.7 UMOL/L (ref 30–120)
LEUCINE SERPL QL: 138.8 UMOL/L (ref 60–180)
LYSINE SERPL-ACNC: 242 UMOL/L (ref 85–230)
METHIONINE SERPL-SCNC: 30 UMOL/L (ref 15–40)
OH-LYSINE SERPL-SCNC: <5 UMOL/L
OH-PROLINE SERPL-SCNC: 34.2 UMOL/L (ref 5–40)
ORNITHINE SERPL-SCNC: 92.6 UMOL/L (ref 25–110)
PATH REV BLD -IMP: ABNORMAL
PHE SERPL-SCNC: 82.2 UMOL/L (ref 30–82)
PHE/TYR RATIO: 1
PROLINE SERPL-SCNC: 275.3 UMOL/L (ref 90–350)
SARCOSINE SERPL-SCNC: <5 UMOL/L
SCAN RESULT: NORMAL
SERINE/CREAT UR-RTO: 106.2 UMOL/L (ref 60–170)
TAURINE SERPL-SCNC: 29.6 UMOL/L (ref 30–130)
THREONINE SERPL-SCNC: 197.6 UMOL/L (ref 60–190)
TRYPTOPHAN SERPL QL: 58.5 UMOL/L (ref 25–80)
TYROSINE SERPL-SCNC: 78.9 UMOL/L (ref 35–110)
VALINE SERPL-SCNC: 240.7 UMOL/L (ref 120–320)

## 2024-07-15 PROCEDURE — 99308 SBSQ NF CARE LOW MDM 20: CPT | Performed by: FAMILY MEDICINE

## 2024-07-15 NOTE — LETTER
Patient: Sol Gomez  : 1940    Encounter Date: 07/15/2024    Resident seen 7/15/24 -- MP    CC: SNF ([facility]) REcheck    : 1940  SNF H&P done 24  DC SUmmary dated 24 reviewed 7/15/24  Allergy: ASA, Propoxyphene, Salicylates, Trees  FULL CODE    S: 85 yo woman with hx of multiple CVAs, DI, and HTN. No CP/SOB. MEd list & problem list reviewed.    O: VSS AFEB 159# awake, alert, NAD. CHest cta. Heart rrr. Ext no c/c/e.    LAB (7/10/24) Na 141, K 4.5, Cr 0.6, alb 3.3.    A/P:  # Weakness/Syncope: SNF PT/OT  # HTN: Lopressor 25 bid  # FEN: MgO 400 mg bid, Na stable on desmopressin.  # Neuropathy: gabapentin 300 mg tid  # HLD: statin  # DI: desmopressin acetate 0.1 mg 1/2 tablet bid.      Electronically Signed By: Matt Nelson MD   24  8:24 AM

## 2024-07-17 ENCOUNTER — NURSING HOME VISIT (OUTPATIENT)
Dept: POST ACUTE CARE | Facility: EXTERNAL LOCATION | Age: 84
End: 2024-07-17
Payer: MEDICARE

## 2024-07-17 ENCOUNTER — LAB REQUISITION (OUTPATIENT)
Dept: LAB | Facility: HOSPITAL | Age: 84
End: 2024-07-17

## 2024-07-17 DIAGNOSIS — I48.91 ATRIAL FIBRILLATION, UNSPECIFIED TYPE (MULTI): ICD-10-CM

## 2024-07-17 DIAGNOSIS — E87.1 HYPO-OSMOLALITY AND HYPONATREMIA: ICD-10-CM

## 2024-07-17 DIAGNOSIS — R25.1 TREMOR: ICD-10-CM

## 2024-07-17 DIAGNOSIS — G56.00 CARPAL TUNNEL SYNDROME, UNSPECIFIED LATERALITY: ICD-10-CM

## 2024-07-17 DIAGNOSIS — M62.81 MUSCLE WEAKNESS (GENERALIZED): Primary | ICD-10-CM

## 2024-07-17 DIAGNOSIS — I10 ESSENTIAL HYPERTENSION: ICD-10-CM

## 2024-07-17 DIAGNOSIS — Z86.73 HISTORY OF CVA (CEREBROVASCULAR ACCIDENT): ICD-10-CM

## 2024-07-17 DIAGNOSIS — E61.2 MAGNESIUM DEFICIENCY: ICD-10-CM

## 2024-07-17 DIAGNOSIS — R55 SYNCOPE AND COLLAPSE: ICD-10-CM

## 2024-07-17 DIAGNOSIS — E23.2 DIABETES INSIPIDUS (MULTI): ICD-10-CM

## 2024-07-17 LAB
ALBUMIN SERPL BCP-MCNC: 3.7 G/DL (ref 3.4–5)
ANION GAP SERPL CALC-SCNC: 13 MMOL/L (ref 10–20)
BUN SERPL-MCNC: 21 MG/DL (ref 6–23)
CALCIUM SERPL-MCNC: 8.9 MG/DL (ref 8.6–10.3)
CHLORIDE SERPL-SCNC: 105 MMOL/L (ref 98–107)
CO2 SERPL-SCNC: 27 MMOL/L (ref 21–32)
CREAT SERPL-MCNC: 0.54 MG/DL (ref 0.5–1.05)
EGFRCR SERPLBLD CKD-EPI 2021: >90 ML/MIN/1.73M*2
GLUCOSE SERPL-MCNC: 89 MG/DL (ref 74–99)
PHOSPHATE SERPL-MCNC: 3.6 MG/DL (ref 2.5–4.9)
POTASSIUM SERPL-SCNC: 4 MMOL/L (ref 3.5–5.3)
SODIUM SERPL-SCNC: 141 MMOL/L (ref 136–145)

## 2024-07-17 PROCEDURE — 99309 SBSQ NF CARE MODERATE MDM 30: CPT | Performed by: NURSE PRACTITIONER

## 2024-07-17 PROCEDURE — 80069 RENAL FUNCTION PANEL: CPT | Mod: OUT | Performed by: FAMILY MEDICINE

## 2024-07-18 PROBLEM — E23.2 DIABETES INSIPIDUS (MULTI): Status: ACTIVE | Noted: 2024-07-18

## 2024-07-18 LAB — SCAN RESULT: NORMAL

## 2024-07-18 NOTE — PROGRESS NOTES
Resident seen 7/15/24 -- MP    CC: [facility] ([facility]) REcheck    : 1940  SNF H&P done 24  DC SUmmary dated 24 reviewed 7/15/24  Allergy: ASA, Propoxyphene, Salicylates, Trees  FULL CODE    S: 83 yo woman with hx of multiple CVAs, DI, and HTN. No CP/SOB. MEd list & problem list reviewed.    O: VSS AFEB 159# awake, alert, NAD. CHest cta. Heart rrr. Ext no c/c/e.    LAB (7/10/24) Na 141, K 4.5, Cr 0.6, alb 3.3.    A/P:  # Weakness/Syncope: [facility] PT/OT  # HTN: Lopressor 25 bid  # FEN: MgO 400 mg bid, Na stable on desmopressin.  # Neuropathy: gabapentin 300 mg tid  # HLD: statin  # DI: desmopressin acetate 0.1 mg 1/2 tablet bid.

## 2024-07-18 NOTE — PROGRESS NOTES
Sol Gomez is a 84 y.o. female with Chief Complaint of SNF ([place]) H&P    Resident seen 24 -- PAM    CC: SNF ([place]) H&P    : 1940  SNF H&P done 24  DC SUmmary dated 24 reviewed 7/15/24  Allergy: ASA, Propoxyphene, Salicylates, Trees  FULL CODE    S: 85 yo woman with hx of multiple CVAs, DI, and HTN admitted to SNF rehab after hospitalization for acute syncope and hyponatremia. No CP/SOB. MEd list & problem list reviewed .    O: VSS AFEB 159# awake, alert, NAD. CHest cta. Heart rrr. Ext no c/c/e.    LAB (7/10/24) Na 141, K 4.5, Cr 0.6, alb 3.3.    A/P:  # Weakness/Syncope: SNF PT/OT  # HTN: Lopressor 25 bid  # FEN: MgO 400 mg bid, Na stable on desmopressin.  # Neuropathy: gabapentin 300 mg tid  # HLD: statin  # DI: desmopressin acetate 0.1 mg 1/2 tablet bid.      Past Surgical History:   Procedure Laterality Date    MR HEAD ANGIO WO IV CONTRAST  7/15/2012    MR HEAD ANGIO WO IV CONTRAST [facility]    MR HEAD ANGIO WO IV CONTRAST  2013    MR HEAD ANGIO WO IV CONTRAST [facility]      Social History     Socioeconomic History    Marital status:      Spouse name: Not on file    Number of children: Not on file    Years of education: Not on file    Highest education level: Not on file   Occupational History    Not on file   Tobacco Use    Smoking status: Never    Smokeless tobacco: Never   Vaping Use    Vaping status: Never Used   Substance and Sexual Activity    Alcohol use: Not Currently     Alcohol/week: 1.0 standard drink of alcohol     Types: 1 Glasses of wine per week    Drug use: Never    Sexual activity: Not Currently   Other Topics Concern    Not on file   Social History Narrative    Not on file     Social Determinants of Health     Financial Resource Strain: Low Risk  (2024)    Overall Financial Resource Strain (CARDIA)     Difficulty of Paying Living Expenses: Not hard at all   Food Insecurity: No Food Insecurity (2022)    Received from  [facility]    Hunger Vital Sign     Worried About Running Out of Food in the Last Year: Never true     Ran Out of Food in the Last Year: Never true   Transportation Needs: No Transportation Needs (7/4/2024)    PRAPARE - Transportation     Lack of Transportation (Medical): No     Lack of Transportation (Non-Medical): No   Physical Activity: Insufficiently Active (9/13/2022)    Received from [facility]    Exercise Vital Sign     Days of Exercise per Week: 2 days     Minutes of Exercise per Session: 30 min   Stress: No Stress Concern Present (9/13/2022)    Received from [facility]    [employer] - Occupational Stress Questionnaire     Feeling of Stress : Not at all   Social Connections: Moderately Isolated (9/13/2022)    Received from [facility]    Social Connection and Isolation Panel [NHANES]     Frequency of Communication with Friends and Family: More than three times a week     Frequency of Social Gatherings with Friends and Family: Twice a week     Attends Taoist Services: More than 4 times per year     Active Member of Clubs or Organizations: No     Attends Club or Organization Meetings: Never     Marital Status:    Intimate Partner Violence: Not on file   Housing Stability: Low Risk  (7/4/2024)    Housing Stability Vital Sign     Unable to Pay for Housing in the Last Year: No     Number of Places Lived in the Last Year: 1     Unstable Housing in the Last Year: No     Past Medical History:   Diagnosis Date    Atrial fibrillation (Multi) 06/28/2024    CVA (cerebral vascular accident) (Multi) 12/03/2010    Essential hypertension 06/28/2024    Syncope and collapse 06/28/2024      No family history on file.     Review of Systems   Constitutional:  Negative for chills, fatigue and fever.   HENT:  Negative for rhinorrhea and sore throat.    Eyes:  Negative for pain and redness.   Respiratory:  Negative for cough and shortness of breath.    Cardiovascular:   Negative for chest pain and palpitations.   Gastrointestinal:  Negative for abdominal pain and blood in stool.   Endocrine: Negative for polydipsia and polyuria.   Genitourinary:  Negative for dysuria and hematuria.   Musculoskeletal:  Negative for back pain and neck stiffness.   Skin:  Negative for rash and wound.   Allergic/Immunologic: Negative for environmental allergies and food allergies.   Neurological:  Positive for weakness. Negative for headaches.   Hematological:  Negative for adenopathy. Does not bruise/bleed easily.   Psychiatric/Behavioral:  Negative for hallucinations and suicidal ideas.       There were no vitals taken for this visit.  Physical Exam  Vitals reviewed.   Constitutional:       General: She is not in acute distress.     Appearance: She is not ill-appearing.   HENT:      Head: Normocephalic and atraumatic.      Right Ear: Tympanic membrane normal.      Left Ear: Tympanic membrane normal.      Nose: No congestion or rhinorrhea.      Mouth/Throat:      Pharynx: No oropharyngeal exudate or posterior oropharyngeal erythema.   Eyes:      Extraocular Movements: Extraocular movements intact.      Conjunctiva/sclera: Conjunctivae normal.      Pupils: Pupils are equal, round, and reactive to light.   Cardiovascular:      Rate and Rhythm: Normal rate and regular rhythm.      Heart sounds: No murmur heard.     No friction rub. No gallop.   Pulmonary:      Effort: Pulmonary effort is normal.      Breath sounds: Normal breath sounds. No wheezing, rhonchi or rales.   Abdominal:      General: There is no distension.      Palpations: Abdomen is soft.      Tenderness: There is no abdominal tenderness. There is no guarding or rebound.   Musculoskeletal:         General: No swelling or deformity.      Cervical back: Normal range of motion and neck supple.      Right lower leg: No edema.      Left lower leg: No edema.   Skin:     Capillary Refill: Capillary refill takes less than 2 seconds.      Coloration:  "Skin is not jaundiced.      Findings: No rash.   Neurological:      General: No focal deficit present.      Mental Status: She is alert.      Motor: No weakness.   Psychiatric:         Mood and Affect: Mood normal.         Behavior: Behavior normal.       Lab Results   Component Value Date    WBC 8.9 07/10/2024    HGB 11.6 (L) 07/10/2024    HCT 36.9 07/10/2024    MCV 93 07/10/2024     07/10/2024     Lab Results   Component Value Date    CHOL 201 (H) 06/27/2024    CHOL 224 (H) 10/02/2023     Lab Results   Component Value Date    HDL 62.7 06/27/2024    HDL 71.7 10/02/2023     Lab Results   Component Value Date    LDLCALC 115 (H) 06/27/2024    LDLCALC 133 (L) 10/02/2023     Lab Results   Component Value Date    TRIG 118 06/27/2024    TRIG 97 10/02/2023     No components found for: \"CHOLHDL\"  Lab Results   Component Value Date    HGBA1C 5.3 06/27/2024       Assessment/Plan   Problem List Items Addressed This Visit       Essential hypertension    Diabetes insipidus (Multi)       "

## 2024-07-20 LAB
ATRIAL RATE: 103 BPM
ATRIAL RATE: 97 BPM
P AXIS: 55 DEGREES
P AXIS: 59 DEGREES
P OFFSET: 214 MS
P OFFSET: 216 MS
P ONSET: 163 MS
P ONSET: 164 MS
PR INTERVAL: 128 MS
PR INTERVAL: 130 MS
Q ONSET: 228 MS
Q ONSET: 228 MS
QRS COUNT: 15 BEATS
QRS COUNT: 17 BEATS
QRS DURATION: 66 MS
QRS DURATION: 68 MS
QT INTERVAL: 348 MS
QT INTERVAL: 372 MS
QTC CALCULATION(BAZETT): 455 MS
QTC CALCULATION(BAZETT): 472 MS
QTC FREDERICIA: 417 MS
QTC FREDERICIA: 436 MS
R AXIS: -18 DEGREES
R AXIS: 17 DEGREES
T AXIS: 33 DEGREES
T AXIS: 40 DEGREES
T OFFSET: 402 MS
T OFFSET: 414 MS
VENTRICULAR RATE: 103 BPM
VENTRICULAR RATE: 97 BPM

## 2024-07-21 NOTE — PROGRESS NOTES
*Provider Impression*    Patient is a 84 year old female who is seen today for management of multiple medical problems       #Weakness / Syncope / CTS / Tremor - PT/OT, gabapentin 300mg TID, acetaminophen 650mg q4h PRN, biofreeze 4x/day PRN, schedule f/u w/ hand surgeon, schedule f/u w/ neurology Dr. Hernandez  #Diabetes insipidus - desmopressin 0.1mg BID, schedule f/u w/ nephrology, check renal panel weekly  #HTN / CVA / A-fib - atorvastatin 80mg QHS, metoprolol 25mg BID, Zio patch on 7/24, f/u w/ Dr. Molina 8/19  #Magnesium deficiency - add magnesium oxide 400mg BID  #ACP - Full Code  Follow up as needed      *Chief Complaint*     syncope    *History of Present Illness*    Patient is an 85 y/o female w/ PMH as below who presented to the hospital for multiple falls over the past 3 days, a syncopal episode on Sunday, increased slurring of speech, and left arm numbness with a burning sensation. Upon initial assessment in the ED, the patient's vitals were stable. She had an NIH of 0. A CT head wo contrast indicated no acute process. An xray of the left humerus indicated no acute fracture or dislocation and calcific tendinosis of the rotator cuff. The labs in the ED were pertinent for a sodium level of 125. Troponins and WBC were unremarkable. The ECG indicated sinus rhythm with occasional PVCs. The patient's urinalysis indicated a UTI with positive leukocyte esterace. Urine cultures were taken. The patient was started on Rocephin and transferred to the general floor.  Upon arriving to the general floor, patient received an Echo, which indicated an EF of 55-60% with impaired left ventricular diastolic filling. An MRI brain was attempted, but examination was limited due to patient mobility. Within the limits of evaluation, there were no signs of acute intracranial abnormality. The patient needed to be placed in soft wrist restraints because of an inability to administer medication and for patient safety. She was  started on a normal saline infusion at 75 ml/hr. Overnight, her sodium increased to 138. Fluid was then discontinued and the patient was placed on 1500ml fluid restriction. Her sodium continued to increase to 145. Nephrology was consulted and recommended continuing her home desmopressin at 0.05 mg BID and removing the fluid restriction.  Neurology was consulted and recommended initializing Gabapentin and obtaining autoimmune antibody labs, which could be followed-up outpatient.  The patient continued to experience PVCs with tachycardia. Cardiology was consulted and the patient was initiated on Metoprolol Tartrate, and vitals became stable. PT evaluated the patient and recommended moderate intensity level of continued care. She received one liter of lactated ringers for dehydration. She was determined to be stable and was discharged to [facility] on .    Biofreeze was added. Labs appreciated as below. Her desmopressin was increased by Dr. Krishnamurthy.     She is seen sitting up in her room today and reports therapy going ok, no f/c, sweats, CP, SOB, cough, n/v, L hand hurts, still same, no edema, or any other new c/o presently.     Allergies - Aspirin, Propoxyphene, Salicylates, Tree pollen   PMH - A-fib, HTN, CVA, diabetes insipidus, carpal tunnel syndrome  PSH - knee injection  FH - none reported  SocHx - Never smoker, No EtOH    *Review of Systems*  All other systems reviewed are negative except as noted in the HPI     *Vital Signs*   Date: 24  - T: 98.2  P: 67  R: 18  BP: 118/69  SpO2: 97% on RA     *Results / Data*  CBC - Date: 7/10/24  WBC: 8.9  HGB: 11.6  HCT: 36.9  PLT: 234  ;   BMP - Date: 24  Na: 141  K: 4.0  Cl: 105  Bicarb: 27  BUN: 21  Cr: 0.54  Glu: 89  Ca: 8.9  Phos: 3.6  Alb: 3.7  ;   LFT - Date:   AST:   ALT:   ALP:   Tbili:   ;   Coags - Date:   INR:   PT:  Other - Date: 24  Ma.03    *Physical Exam*  Gen: (+) NAD, (+) well-appearing  HEENT: (+) normocephalic, (+) MMM  Neck: (+)  supple  Lungs: (+) CTAB, (-) wheezes, (-) rales, (-) rhonchi  Heart: (+) irreg irreg, (-) murmurs  Pulses: (+) palpable  Abd: (+) soft, (+) NT, (+) ND, (+) BS+  Ext: (-) edema, (-) deformity  MSK: (-) joint swelling  Skin: (+) warm, (+) dry, (-) rash  Neuro: (+) follows commands, (+) tremor, (+) alert

## 2024-07-22 ENCOUNTER — HOME VISIT (OUTPATIENT)
Dept: POST ACUTE CARE | Facility: EXTERNAL LOCATION | Age: 84
End: 2024-07-22
Payer: MEDICARE

## 2024-07-22 ENCOUNTER — LAB REQUISITION (OUTPATIENT)
Dept: LAB | Facility: HOSPITAL | Age: 84
End: 2024-07-22

## 2024-07-22 DIAGNOSIS — I10 ESSENTIAL HYPERTENSION: Primary | ICD-10-CM

## 2024-07-22 DIAGNOSIS — E78.9 ABNORMAL SERUM CHOLESTEROL: ICD-10-CM

## 2024-07-22 DIAGNOSIS — E23.2 DIABETES INSIPIDUS (MULTI): ICD-10-CM

## 2024-07-22 DIAGNOSIS — E87.1 HYPO-OSMOLALITY AND HYPONATREMIA: ICD-10-CM

## 2024-07-22 LAB
ANION GAP SERPL CALC-SCNC: 11 MMOL/L (ref 10–20)
BUN SERPL-MCNC: 23 MG/DL (ref 6–23)
CALCIUM SERPL-MCNC: 8.7 MG/DL (ref 8.6–10.3)
CHLORIDE SERPL-SCNC: 104 MMOL/L (ref 98–107)
CO2 SERPL-SCNC: 32 MMOL/L (ref 21–32)
CREAT SERPL-MCNC: 0.64 MG/DL (ref 0.5–1.05)
EGFRCR SERPLBLD CKD-EPI 2021: 87 ML/MIN/1.73M*2
GLUCOSE SERPL-MCNC: 77 MG/DL (ref 74–99)
POTASSIUM SERPL-SCNC: 4.4 MMOL/L (ref 3.5–5.3)
SODIUM SERPL-SCNC: 143 MMOL/L (ref 136–145)

## 2024-07-22 PROCEDURE — 80048 BASIC METABOLIC PNL TOTAL CA: CPT | Mod: OUT | Performed by: FAMILY MEDICINE

## 2024-07-22 PROCEDURE — 99349 HOME/RES VST EST MOD MDM 40: CPT | Performed by: FAMILY MEDICINE

## 2024-07-23 ENCOUNTER — LAB REQUISITION (OUTPATIENT)
Dept: LAB | Facility: HOSPITAL | Age: 84
End: 2024-07-23

## 2024-07-23 DIAGNOSIS — G93.41 METABOLIC ENCEPHALOPATHY: ICD-10-CM

## 2024-07-23 DIAGNOSIS — E87.1 HYPO-OSMOLALITY AND HYPONATREMIA: ICD-10-CM

## 2024-07-23 LAB
ANION GAP SERPL CALC-SCNC: 14 MMOL/L (ref 10–20)
BUN SERPL-MCNC: 24 MG/DL (ref 6–23)
CALCIUM SERPL-MCNC: 9 MG/DL (ref 8.6–10.3)
CHLORIDE SERPL-SCNC: 104 MMOL/L (ref 98–107)
CO2 SERPL-SCNC: 28 MMOL/L (ref 21–32)
CREAT SERPL-MCNC: 0.68 MG/DL (ref 0.5–1.05)
EGFRCR SERPLBLD CKD-EPI 2021: 86 ML/MIN/1.73M*2
GLUCOSE SERPL-MCNC: 89 MG/DL (ref 74–99)
POTASSIUM SERPL-SCNC: 4.5 MMOL/L (ref 3.5–5.3)
SODIUM SERPL-SCNC: 141 MMOL/L (ref 136–145)

## 2024-07-23 PROCEDURE — 80048 BASIC METABOLIC PNL TOTAL CA: CPT | Mod: OUT | Performed by: FAMILY MEDICINE

## 2024-07-23 ASSESSMENT — ENCOUNTER SYMPTOMS
HEMATURIA: 0
POLYDIPSIA: 0
BACK PAIN: 0
BLOOD IN STOOL: 0
PALPITATIONS: 0
COUGH: 0
WOUND: 0
SHORTNESS OF BREATH: 0
SORE THROAT: 0
RHINORRHEA: 0
EYE REDNESS: 0
HEADACHES: 0
BRUISES/BLEEDS EASILY: 0
CHILLS: 0
HALLUCINATIONS: 0
EYE PAIN: 0
NECK STIFFNESS: 0
ADENOPATHY: 0
ABDOMINAL PAIN: 0
WEAKNESS: 1
DYSURIA: 0
FATIGUE: 0
FEVER: 0

## 2024-07-24 ENCOUNTER — APPOINTMENT (OUTPATIENT)
Dept: CARDIOLOGY | Facility: HOSPITAL | Age: 84
End: 2024-07-24
Payer: MEDICARE

## 2024-07-24 ENCOUNTER — LAB REQUISITION (OUTPATIENT)
Dept: LAB | Facility: HOSPITAL | Age: 84
End: 2024-07-24

## 2024-07-24 DIAGNOSIS — E87.1 HYPO-OSMOLALITY AND HYPONATREMIA: ICD-10-CM

## 2024-07-24 LAB
ALBUMIN SERPL BCP-MCNC: 3.4 G/DL (ref 3.4–5)
ANION GAP SERPL CALC-SCNC: 10 MMOL/L (ref 10–20)
BUN SERPL-MCNC: 26 MG/DL (ref 6–23)
CALCIUM SERPL-MCNC: 8.7 MG/DL (ref 8.6–10.3)
CHLORIDE SERPL-SCNC: 103 MMOL/L (ref 98–107)
CO2 SERPL-SCNC: 31 MMOL/L (ref 21–32)
CREAT SERPL-MCNC: 0.69 MG/DL (ref 0.5–1.05)
EGFRCR SERPLBLD CKD-EPI 2021: 86 ML/MIN/1.73M*2
GLUCOSE SERPL-MCNC: 91 MG/DL (ref 74–99)
PHOSPHATE SERPL-MCNC: 4.3 MG/DL (ref 2.5–4.9)
POTASSIUM SERPL-SCNC: 4.6 MMOL/L (ref 3.5–5.3)
SODIUM SERPL-SCNC: 139 MMOL/L (ref 136–145)

## 2024-07-24 PROCEDURE — 84100 ASSAY OF PHOSPHORUS: CPT | Mod: OUT | Performed by: FAMILY MEDICINE

## 2024-07-26 ENCOUNTER — APPOINTMENT (OUTPATIENT)
Dept: CARDIOLOGY | Facility: HOSPITAL | Age: 84
End: 2024-07-26
Payer: MEDICARE

## 2024-07-26 ENCOUNTER — TELEPHONE (OUTPATIENT)
Dept: PRIMARY CARE | Facility: CLINIC | Age: 84
End: 2024-07-26
Payer: MEDICARE

## 2024-07-26 DIAGNOSIS — E23.2 DIABETES INSIPIDUS (MULTI): Primary | ICD-10-CM

## 2024-07-26 NOTE — PROGRESS NOTES
Resident seen in AL apartement 24 accompanied by facility RN -- PAM    CC: AL ([place]) Admit H&P    : 1940  SNF H&P done 24  DC SUmmary dated 24 reviewed 7/15/24  Allergy: ASA, Propoxyphene, Salicylates, Trees  FULL CODE    S: 85 yo woman with hx of multiple CVAs, DI, and HTN. Moved into [facility] for a few days of continued part B PT/OT before returning home after completing covered SNF PT/OT. No CP/SOB. Med list & problem list reviewed.    O: VSS AFEB 159# awake, alert, NAD. CHest cta. Heart rrr. Ext no c/c/e.    LAB (7/10/24) Na 141, K 4.5, Cr 0.6, alb 3.3.    A/P:  # Weakness/Syncope: completed SNF PT/OT. Continue Part B PT/OT to be able to return home with skilled home PT/OT ordered to help with transition to home with homebound status due to recent DX of metabolic encephalopathy, UTI, Syncope & Collapse, and generalized weakness. DC meds ERx'd to [facility] [place].  # HTN: Lopressor 25 bid  # FEN: MgO 400 mg bid, Na stable on desmopressin.  # Neuropathy: gabapentin 300 mg tid  # HLD: statin  # DI: increase desmopressin acetate to 0.1 mg 1/2 tablet tid

## 2024-07-26 NOTE — TELEPHONE ENCOUNTER
Patient was discharged from nursing facility and has an appt here with us for follow up. She needs referral for Endocrinology before they will schedule her.  Call Mellissa the daughter

## 2024-08-02 ENCOUNTER — HOSPITAL ENCOUNTER (OUTPATIENT)
Dept: CARDIOLOGY | Facility: HOSPITAL | Age: 84
Discharge: HOME | End: 2024-08-02
Payer: MEDICARE

## 2024-08-02 DIAGNOSIS — I49.3 FREQUENT PVCS: ICD-10-CM

## 2024-08-02 DIAGNOSIS — E87.1 HYPONATREMIA: ICD-10-CM

## 2024-08-02 PROCEDURE — 93242 EXT ECG>48HR<7D RECORDING: CPT

## 2024-08-07 ENCOUNTER — APPOINTMENT (OUTPATIENT)
Dept: PRIMARY CARE | Facility: CLINIC | Age: 84
End: 2024-08-07
Payer: MEDICARE

## 2024-08-09 ENCOUNTER — LAB (OUTPATIENT)
Dept: LAB | Facility: LAB | Age: 84
End: 2024-08-09
Payer: MEDICARE

## 2024-08-09 ENCOUNTER — APPOINTMENT (OUTPATIENT)
Dept: PRIMARY CARE | Facility: CLINIC | Age: 84
End: 2024-08-09
Payer: MEDICARE

## 2024-08-09 DIAGNOSIS — R25.1 TREMOR OF BOTH HANDS: ICD-10-CM

## 2024-08-09 DIAGNOSIS — I49.8 OTHER CARDIAC ARRHYTHMIA: ICD-10-CM

## 2024-08-09 DIAGNOSIS — E23.2 DIABETES INSIPIDUS (MULTI): ICD-10-CM

## 2024-08-09 DIAGNOSIS — R39.9 UTI SYMPTOMS: ICD-10-CM

## 2024-08-09 DIAGNOSIS — R79.89 ABNORMAL CBC: ICD-10-CM

## 2024-08-09 DIAGNOSIS — E23.2 DIABETES INSIPIDUS (MULTI): Primary | ICD-10-CM

## 2024-08-09 PROBLEM — I49.9 ARRHYTHMIA: Status: ACTIVE | Noted: 2024-08-09

## 2024-08-09 LAB
ALBUMIN SERPL BCP-MCNC: 4.3 G/DL (ref 3.4–5)
ALP SERPL-CCNC: 81 U/L (ref 33–136)
ALT SERPL W P-5'-P-CCNC: 13 U/L (ref 7–45)
ANION GAP SERPL CALC-SCNC: 11 MMOL/L (ref 10–20)
APPEARANCE UR: CLEAR
AST SERPL W P-5'-P-CCNC: 16 U/L (ref 9–39)
BASOPHILS # BLD AUTO: 0.03 X10*3/UL (ref 0–0.1)
BASOPHILS NFR BLD AUTO: 0.4 %
BILIRUB SERPL-MCNC: 0.5 MG/DL (ref 0–1.2)
BILIRUB UR STRIP.AUTO-MCNC: NEGATIVE MG/DL
BUN SERPL-MCNC: 22 MG/DL (ref 6–23)
CALCIUM SERPL-MCNC: 9 MG/DL (ref 8.6–10.3)
CHLORIDE SERPL-SCNC: 103 MMOL/L (ref 98–107)
CO2 SERPL-SCNC: 30 MMOL/L (ref 21–32)
COLOR UR: NORMAL
CREAT SERPL-MCNC: 0.67 MG/DL (ref 0.5–1.05)
EGFRCR SERPLBLD CKD-EPI 2021: 86 ML/MIN/1.73M*2
EOSINOPHIL # BLD AUTO: 0.11 X10*3/UL (ref 0–0.4)
EOSINOPHIL NFR BLD AUTO: 1.4 %
ERYTHROCYTE [DISTWIDTH] IN BLOOD BY AUTOMATED COUNT: 13.8 % (ref 11.5–14.5)
GLUCOSE SERPL-MCNC: 94 MG/DL (ref 74–99)
GLUCOSE UR STRIP.AUTO-MCNC: NORMAL MG/DL
HCT VFR BLD AUTO: 37.6 % (ref 36–46)
HGB BLD-MCNC: 11.9 G/DL (ref 12–16)
IMM GRANULOCYTES # BLD AUTO: 0.01 X10*3/UL (ref 0–0.5)
IMM GRANULOCYTES NFR BLD AUTO: 0.1 % (ref 0–0.9)
KETONES UR STRIP.AUTO-MCNC: NEGATIVE MG/DL
LEUKOCYTE ESTERASE UR QL STRIP.AUTO: NEGATIVE
LYMPHOCYTES # BLD AUTO: 2.06 X10*3/UL (ref 0.8–3)
LYMPHOCYTES NFR BLD AUTO: 26.5 %
MCH RBC QN AUTO: 29.4 PG (ref 26–34)
MCHC RBC AUTO-ENTMCNC: 31.6 G/DL (ref 32–36)
MCV RBC AUTO: 93 FL (ref 80–100)
MONOCYTES # BLD AUTO: 0.61 X10*3/UL (ref 0.05–0.8)
MONOCYTES NFR BLD AUTO: 7.9 %
NEUTROPHILS # BLD AUTO: 4.94 X10*3/UL (ref 1.6–5.5)
NEUTROPHILS NFR BLD AUTO: 63.7 %
NITRITE UR QL STRIP.AUTO: NEGATIVE
NRBC BLD-RTO: 0 /100 WBCS (ref 0–0)
PH UR STRIP.AUTO: 6.5 [PH]
PLATELET # BLD AUTO: 145 X10*3/UL (ref 150–450)
POTASSIUM SERPL-SCNC: 4.4 MMOL/L (ref 3.5–5.3)
PROT SERPL-MCNC: 6.4 G/DL (ref 6.4–8.2)
PROT UR STRIP.AUTO-MCNC: NEGATIVE MG/DL
RBC # BLD AUTO: 4.05 X10*6/UL (ref 4–5.2)
RBC # UR STRIP.AUTO: NEGATIVE /UL
SODIUM SERPL-SCNC: 140 MMOL/L (ref 136–145)
SP GR UR STRIP.AUTO: 1.02
UROBILINOGEN UR STRIP.AUTO-MCNC: NORMAL MG/DL
WBC # BLD AUTO: 7.8 X10*3/UL (ref 4.4–11.3)

## 2024-08-09 PROCEDURE — 99213 OFFICE O/P EST LOW 20 MIN: CPT | Performed by: FAMILY MEDICINE

## 2024-08-09 PROCEDURE — 36415 COLL VENOUS BLD VENIPUNCTURE: CPT

## 2024-08-09 PROCEDURE — 81003 URINALYSIS AUTO W/O SCOPE: CPT

## 2024-08-09 PROCEDURE — 99442 PR PHYS/QHP TELEPHONE EVALUATION 11-20 MIN: CPT | Performed by: FAMILY MEDICINE

## 2024-08-09 PROCEDURE — 80053 COMPREHEN METABOLIC PANEL: CPT

## 2024-08-09 PROCEDURE — 85025 COMPLETE CBC W/AUTO DIFF WBC: CPT

## 2024-08-09 NOTE — ASSESSMENT & PLAN NOTE
Has previously seen Dr. Hernandez - Neurology. Advised follow up with him.   
Patient recently had holter monitor and will follow up with cardiology. Continue Metoprolol 25mg.   
We will get a UA and follow up with results.   
Will follow up with Endocrinology in Oct.   
Will follow up with results  
EKG/INR/CBC/CMP/PT/PTT/Type and Screen/Urinalysis

## 2024-08-09 NOTE — PROGRESS NOTES
Subjective   Patient ID: Sol Gomez is a 84 y.o. female who presents for No chief complaint on file..              HPI  1.  SNF discharge follow up  - Discharged from SNF 1.5 weeks ago  - She was previously admitted for acute metabolic encephalopathy and was found to be severely hyponatremic. Normalized before discharge from hospital.   - Daughter is reporting patient has continued tremors in hands b/l. Had an MRI of the brain but it was an inadequate exam due to patient movement. Saw Dr. Hernandez prior to hospitalization and was working her up.  - Had a UTI, complicated by bladder prolapse and difficulty with hygiene, requesting UA  - Recently had holter monitor placed and will be following up with cardiology  - Adjustments were made to her desmopressin during hospitalization, she is following up with Endocrinology in a couple months.  - would like to discontinue statin as it was started during admission    Review of Systems  All pertinent positive symptoms are included in the history of present illness.    All other systems have been reviewed and are negative and noncontributory to this patient's current ailments.    Past Medical History:   Diagnosis Date    Atrial fibrillation (Multi) 06/28/2024    CVA (cerebral vascular accident) (Multi) 12/03/2010    Essential hypertension 06/28/2024    Syncope and collapse 06/28/2024     Past Surgical History:   Procedure Laterality Date    MR HEAD ANGIO WO IV CONTRAST  7/15/2012    MR HEAD ANGIO WO IV CONTRAST [facility]    MR HEAD ANGIO WO IV CONTRAST  1/11/2013    MR HEAD ANGIO WO IV CONTRAST [facility]     Social History     Tobacco Use    Smoking status: Never    Smokeless tobacco: Never   Vaping Use    Vaping status: Never Used   Substance Use Topics    Alcohol use: Not Currently     Alcohol/week: 1.0 standard drink of alcohol     Types: 1 Glasses of wine per week    Drug use: Never     No family history on file.  Immunization History    Administered Date(s) Administered    Hep A / Hep B 09/19/2013    Td vaccine, age 7 years and older (TDVAX) 11/18/2009    Tdap vaccine, age 7 year and older (BOOSTRIX, ADACEL) 07/18/2019     Current Outpatient Medications   Medication Instructions    acetaminophen (TYLENOL) 325 mg, oral, Every 4 hours PRN    desmopressin (DDAVP) 0.05 mg, oral, 2 times daily    gabapentin (NEURONTIN) 300 mg, oral, 3 times daily    metoprolol tartrate (LOPRESSOR) 25 mg, oral, 2 times daily     Allergies   Allergen Reactions    Aspirin Unknown    Propoxyphene Other and Unknown     Vomiting    Salicylates Unknown     epistaxis    Allergenic Ext-Tree Pollen Other       Objective   There were no vitals filed for this visit.  There is no height or weight on file to calculate BMI.    BP Readings from Last 3 Encounters:   07/08/24 106/62   06/24/24 (!) 144/100   06/19/24 (!) 153/94      Wt Readings from Last 3 Encounters:   07/03/24 69.9 kg (154 lb 1.6 oz)   06/24/24 69.9 kg (154 lb)   06/19/24 69.9 kg (154 lb)        Lab Requisition on 07/24/2024   Component Date Value    Glucose 07/24/2024 91     Sodium 07/24/2024 139     Potassium 07/24/2024 4.6     Chloride 07/24/2024 103     Bicarbonate 07/24/2024 31     Anion Gap 07/24/2024 10     Urea Nitrogen 07/24/2024 26 (H)     Creatinine 07/24/2024 0.69     eGFR 07/24/2024 86     Calcium 07/24/2024 8.7     Phosphorus 07/24/2024 4.3     Albumin 07/24/2024 3.4    Lab Requisition on 07/23/2024   Component Date Value    Glucose 07/23/2024 89     Sodium 07/23/2024 141     Potassium 07/23/2024 4.5     Chloride 07/23/2024 104     Bicarbonate 07/23/2024 28     Anion Gap 07/23/2024 14     Urea Nitrogen 07/23/2024 24 (H)     Creatinine 07/23/2024 0.68     eGFR 07/23/2024 86     Calcium 07/23/2024 9.0    Lab Requisition on 07/22/2024   Component Date Value    Glucose 07/22/2024 77     Sodium 07/22/2024 143     Potassium 07/22/2024 4.4     Chloride 07/22/2024 104     Bicarbonate 07/22/2024 32     Anion Gap  07/22/2024 11     Urea Nitrogen 07/22/2024 23     Creatinine 07/22/2024 0.64     eGFR 07/22/2024 87     Calcium 07/22/2024 8.7    Lab Requisition on 07/17/2024   Component Date Value    Glucose 07/17/2024 89     Sodium 07/17/2024 141     Potassium 07/17/2024 4.0     Chloride 07/17/2024 105     Bicarbonate 07/17/2024 27     Anion Gap 07/17/2024 13     Urea Nitrogen 07/17/2024 21     Creatinine 07/17/2024 0.54     eGFR 07/17/2024 >90     Calcium 07/17/2024 8.9     Phosphorus 07/17/2024 3.6     Albumin 07/17/2024 3.7    Lab Requisition on 07/10/2024   Component Date Value    Glucose 07/10/2024 82     Sodium 07/10/2024 141     Potassium 07/10/2024 4.5     Chloride 07/10/2024 104     Bicarbonate 07/10/2024 29     Anion Gap 07/10/2024 13     Urea Nitrogen 07/10/2024 19     Creatinine 07/10/2024 0.60     eGFR 07/10/2024 89     Calcium 07/10/2024 8.6     WBC 07/10/2024 8.9     nRBC 07/10/2024 0.0     RBC 07/10/2024 3.96 (L)     Hemoglobin 07/10/2024 11.6 (L)     Hematocrit 07/10/2024 36.9     MCV 07/10/2024 93     MCH 07/10/2024 29.3     MCHC 07/10/2024 31.4 (L)     RDW 07/10/2024 13.3     Platelets 07/10/2024 234      Physical Exam  Unable to assess patient due to nature of telephone visit. Also we were speaking with her daughter, Mellissa, for most of the visit.     Assessment/Plan   No red flags. Patient okay to discontinue statin as benefits do not outweigh risks at this stage. Will follow up on sodium to ensure no continued abnormalities. Follow up with specialists as advised.     Problem List Items Addressed This Visit       Diabetes insipidus (Multi) - Primary     Will follow up with Endocrinology in Oct.          Relevant Orders    Comprehensive metabolic panel    UTI symptoms     We will get a UA and follow up with results.          Relevant Orders    Urinalysis with Reflex Microscopic    Abnormal CBC     Will follow up with results         Relevant Orders    CBC and Auto Differential    Tremor of both hands     Has  previously seen Dr. Hernandez - Neurology. Advised follow up with him.          Arrhythmia     Patient recently had holter monitor and will follow up with cardiology. Continue Metoprolol 25mg.

## 2024-08-10 NOTE — PROGRESS NOTES
I reviewed and examined the patient. I was present for the key exam elements, and I fully participated in the patient's care. I discussed the management of the care with the resident. I have personally reviewed the pertinent labs and imaging, as well as recent notes, with the patient. I have reviewed the note above and agree with the resident's medical decision making as documented in the resident's note, in addition to the following comments / findings:     Agree with the rest of the plan outlined below by resident physician. No red flags.      The patient understands and agrees to the assessment and plan of care. Patient has also agreed to follow up and comply with the treatment and evaluation as recommended today. Patient was instructed to call the office at [phone] should questions arise regarding their treatment or care.     Tae Pichardo DO, FAOASM  Family Medicine   [address], [address]  [place] [address]     Tae Pichardo DO

## 2024-08-19 ENCOUNTER — OFFICE VISIT (OUTPATIENT)
Dept: CARDIOLOGY | Facility: HOSPITAL | Age: 84
End: 2024-08-19
Payer: MEDICARE

## 2024-08-19 ENCOUNTER — APPOINTMENT (OUTPATIENT)
Dept: CARDIOLOGY | Facility: HOSPITAL | Age: 84
End: 2024-08-19
Payer: MEDICARE

## 2024-08-19 VITALS — DIASTOLIC BLOOD PRESSURE: 80 MMHG | SYSTOLIC BLOOD PRESSURE: 147 MMHG | OXYGEN SATURATION: 96 % | HEART RATE: 70 BPM

## 2024-08-19 DIAGNOSIS — I49.3 PVC (PREMATURE VENTRICULAR CONTRACTION): Primary | ICD-10-CM

## 2024-08-19 DIAGNOSIS — I47.10 NONSUSTAINED PAROXYSMAL SUPRAVENTRICULAR TACHYCARDIA (CMS-HCC): ICD-10-CM

## 2024-08-19 PROCEDURE — 1036F TOBACCO NON-USER: CPT | Performed by: STUDENT IN AN ORGANIZED HEALTH CARE EDUCATION/TRAINING PROGRAM

## 2024-08-19 PROCEDURE — 99214 OFFICE O/P EST MOD 30 MIN: CPT | Mod: 25 | Performed by: STUDENT IN AN ORGANIZED HEALTH CARE EDUCATION/TRAINING PROGRAM

## 2024-08-19 PROCEDURE — 93010 ELECTROCARDIOGRAM REPORT: CPT | Performed by: STUDENT IN AN ORGANIZED HEALTH CARE EDUCATION/TRAINING PROGRAM

## 2024-08-19 PROCEDURE — 3079F DIAST BP 80-89 MM HG: CPT | Performed by: STUDENT IN AN ORGANIZED HEALTH CARE EDUCATION/TRAINING PROGRAM

## 2024-08-19 PROCEDURE — 3077F SYST BP >= 140 MM HG: CPT | Performed by: STUDENT IN AN ORGANIZED HEALTH CARE EDUCATION/TRAINING PROGRAM

## 2024-08-19 PROCEDURE — 1159F MED LIST DOCD IN RCRD: CPT | Performed by: STUDENT IN AN ORGANIZED HEALTH CARE EDUCATION/TRAINING PROGRAM

## 2024-08-19 PROCEDURE — 93005 ELECTROCARDIOGRAM TRACING: CPT | Performed by: STUDENT IN AN ORGANIZED HEALTH CARE EDUCATION/TRAINING PROGRAM

## 2024-08-19 PROCEDURE — 99204 OFFICE O/P NEW MOD 45 MIN: CPT | Performed by: STUDENT IN AN ORGANIZED HEALTH CARE EDUCATION/TRAINING PROGRAM

## 2024-08-19 ASSESSMENT — ENCOUNTER SYMPTOMS
SHORTNESS OF BREATH: 0
CONFUSION: 0
PALPITATIONS: 0
FEVER: 0
DIZZINESS: 0

## 2024-08-19 NOTE — PROGRESS NOTES
Referred by Dr. Wayne for No chief complaint on file.     History Of Present Illness:    Sol Gomez is a 84 y.o. female with PMH of multiple CVAs, Diabetes Insipidus, carpal tunnel syndrome, and HTN. Patient was referred to EP after recent admission due to urinary tract infection and confusion.  During her admission she was having frequent PVCs and was started on metoprolol.  She had an event monitor that showed sinus rhythm with rates 49- bpm.  She had 4 episodes of nonsustained VT with maximum of 4 beats and episodes of nonsustained SVT.  She had PVCs with a 4% burden.  Patient has no complaints of palpitations, chest pain, near-syncope or shortness of breath.        Past Medical History:  She has a past medical history of Atrial fibrillation (Multi) (06/28/2024), CVA (cerebral vascular accident) (Multi) (12/03/2010), Essential hypertension (06/28/2024), and Syncope and collapse (06/28/2024).    Past Surgical History:  She has a past surgical history that includes MR angio head wo IV contrast (7/15/2012) and MR angio head wo IV contrast (1/11/2013).      Social History:  She reports that she has never smoked. She has never used smokeless tobacco. She reports that she does not currently use alcohol after a past usage of about 1.0 standard drink of alcohol per week. She reports that she does not use drugs.    Family History:  No family history on file.     Allergies:  Aspirin, Propoxyphene, Salicylates, and Allergenic ext-tree pollen    Outpatient Medications:  Current Outpatient Medications   Medication Instructions    desmopressin (DDAVP) 0.05 mg, oral, 2 times daily    gabapentin (NEURONTIN) 300 mg, oral, 3 times daily    metoprolol tartrate (LOPRESSOR) 25 mg, oral, 2 times daily        Last Recorded Vitals:  Vitals:    08/19/24 1106   BP: 147/80   Pulse: 70   SpO2: 96%       Review of Systems   Constitutional:  Negative for fever.   Respiratory:  Negative for shortness of breath.     Cardiovascular:  Negative for chest pain, palpitations and leg swelling.        As per history.   Neurological:  Negative for dizziness and syncope.   Psychiatric/Behavioral:  Negative for confusion.       Physical Exam  Constitutional:       Appearance: Normal appearance.   Cardiovascular:      Rate and Rhythm: Normal rate and regular rhythm.      Heart sounds: No murmur heard.     No friction rub. No gallop.   Pulmonary:      Effort: Pulmonary effort is normal.      Breath sounds: Normal breath sounds.   Abdominal:      Palpations: Abdomen is soft.   Musculoskeletal:      Cervical back: Neck supple.   Neurological:      Mental Status: She is alert.   Psychiatric:         Mood and Affect: Mood normal.         Behavior: Behavior normal.             Last Labs:  CBC -  Lab Results   Component Value Date    WBC 7.8 08/09/2024    HGB 11.9 (L) 08/09/2024    HCT 37.6 08/09/2024    MCV 93 08/09/2024     (L) 08/09/2024       CMP -  Lab Results   Component Value Date    CALCIUM 9.0 08/09/2024    PHOS 4.3 07/24/2024    PROT 6.4 08/09/2024    ALBUMIN 4.3 08/09/2024    AST 16 08/09/2024    ALT 13 08/09/2024    ALKPHOS 81 08/09/2024    BILITOT 0.5 08/09/2024       LIPID PANEL -   Lab Results   Component Value Date    CHOL 201 (H) 06/27/2024    TRIG 118 06/27/2024    HDL 62.7 06/27/2024    CHHDL 3.2 06/27/2024    VLDL 24 06/27/2024    NHDL 138 06/27/2024       RENAL FUNCTION PANEL -   Lab Results   Component Value Date    GLUCOSE 94 08/09/2024     08/09/2024    K 4.4 08/09/2024     08/09/2024    CO2 30 08/09/2024    ANIONGAP 11 08/09/2024    BUN 22 08/09/2024    CREATININE 0.67 08/09/2024    CALCIUM 9.0 08/09/2024    PHOS 4.3 07/24/2024    ALBUMIN 4.3 08/09/2024        Lab Results   Component Value Date     (H) 06/24/2024    HGBA1C 5.3 06/27/2024       Last Cardiology Tests:  ECG:  Electrocardiogram, 12-lead PRN ACS symptoms 07/06/2024    Sinus rhythm, possible left atrial enlargement, heart rate 62  bpm.    Echo:  Transthoracic Echo (TTE) Complete 07/03/2024    PHYSICIAN INTERPRETATION:  Left Ventricle: The left ventricular systolic function is low normal, with a visually estimated ejection fraction of 50-55%. The left ventricular cavity size is normal. Spectral Doppler shows an impaired relaxation pattern of left ventricular diastolic filling.  Left Atrium: The left atrium is normal in size. A bubble study using agitated saline was performed. Bubble study is negative.  Right Ventricle: The right ventricle is normal in size. There is normal right ventricular global systolic function.  Right Atrium: The right atrium is normal in size.  Aortic Valve: The aortic valve was not well visualized. There is no evidence of aortic valve stenosis. The aortic valve dimensionless index is 0.51. There is no evidence of aortic valve regurgitation. The peak instantaneous gradient of the aortic valve is 9.8 mmHg. The mean gradient of the aortic valve is 5.4 mmHg.  Mitral Valve: The mitral valve is mild to moderately thickened. There is mild mitral valve regurgitation.  Tricuspid Valve: The tricuspid valve was not well visualized. There is trace to mild tricuspid regurgitation. The right ventricular systolic pressure is unable to be estimated.  Pulmonic Valve: The pulmonic valve is not well visualized. There is physiologic pulmonic valve regurgitation.  Pericardium: There is no pericardial effusion noted. There is a pericardial fat pad present.  Aorta: The aortic root was not well visualized.  Systemic Veins: The inferior vena cava size appears small. There is IVC inspiratory collapse greater than 50%.  In comparison to the previous echocardiogram(s): There are no prior studies on this patient for comparison purposes.        CONCLUSIONS:   1. The left ventricular systolic function is low normal, with a visually estimated ejection fraction of 50-55%.   2. Spectral Doppler shows an impaired relaxation pattern of left ventricular  diastolic filling.   3. There is normal right ventricular global systolic function.   4. Aortic valve stenosis is not present.    Diagnostic review: I have personally reviewed the result(s)     Assessment/Plan   Diagnoses and all orders for this visit:  PVC (premature ventricular contraction)  Nonsustained paroxysmal supraventricular tachycardia (CMS-HCC)  -     ECG 12 lead (Clinic Performed)    Patient was referred to EP after recent admission due to urinary tract infection and confusion.  During her admission she was having frequent PVCs and was started on metoprolol.  She had an event monitor that showed sinus rhythm with rates 49- bpm.  She had 4 episodes of nonsustained VT with maximum of 4 beats and episodes of nonsustained SVT.  She had PVCs with a 4% burden.  Patient has no complaints of palpitations, chest pain, near-syncope or shortness of breath.  Will keep her metoprolol at the same dose.  Will follow-up as needed.      Jean Carlos Nobles MD

## 2024-08-21 DIAGNOSIS — E87.1 HYPONATREMIA: ICD-10-CM

## 2024-08-21 RX ORDER — METOPROLOL TARTRATE 25 MG/1
25 TABLET, FILM COATED ORAL 2 TIMES DAILY
Qty: 180 TABLET | Refills: 2 | Status: SHIPPED | OUTPATIENT
Start: 2024-08-21 | End: 2025-05-18

## 2024-08-21 RX ORDER — GABAPENTIN 300 MG/1
300 CAPSULE ORAL 3 TIMES DAILY
Qty: 270 CAPSULE | Refills: 0 | Status: SHIPPED | OUTPATIENT
Start: 2024-08-21 | End: 2024-11-20

## 2024-08-25 LAB
ATRIAL RATE: 62 BPM
P AXIS: 59 DEGREES
P OFFSET: 208 MS
P ONSET: 152 MS
PR INTERVAL: 146 MS
Q ONSET: 225 MS
QRS COUNT: 11 BEATS
QRS DURATION: 70 MS
QT INTERVAL: 444 MS
QTC CALCULATION(BAZETT): 450 MS
QTC FREDERICIA: 449 MS
R AXIS: 9 DEGREES
T AXIS: 14 DEGREES
T OFFSET: 447 MS
VENTRICULAR RATE: 62 BPM

## 2024-08-26 ENCOUNTER — TELEPHONE (OUTPATIENT)
Dept: PRIMARY CARE | Facility: CLINIC | Age: 84
End: 2024-08-26
Payer: MEDICARE

## 2024-08-27 ENCOUNTER — OFFICE VISIT (OUTPATIENT)
Dept: ORTHOPEDIC SURGERY | Facility: CLINIC | Age: 84
End: 2024-08-27
Payer: MEDICARE

## 2024-08-27 DIAGNOSIS — G56.02 LEFT CARPAL TUNNEL SYNDROME: Primary | ICD-10-CM

## 2024-08-27 PROCEDURE — 1160F RVW MEDS BY RX/DR IN RCRD: CPT | Performed by: ORTHOPAEDIC SURGERY

## 2024-08-27 PROCEDURE — 1036F TOBACCO NON-USER: CPT | Performed by: ORTHOPAEDIC SURGERY

## 2024-08-27 PROCEDURE — 99203 OFFICE O/P NEW LOW 30 MIN: CPT | Performed by: ORTHOPAEDIC SURGERY

## 2024-08-27 PROCEDURE — 1159F MED LIST DOCD IN RCRD: CPT | Performed by: ORTHOPAEDIC SURGERY

## 2024-08-27 PROCEDURE — 2500000005 HC RX 250 GENERAL PHARMACY W/O HCPCS: Performed by: ORTHOPAEDIC SURGERY

## 2024-08-27 PROCEDURE — 99213 OFFICE O/P EST LOW 20 MIN: CPT | Performed by: ORTHOPAEDIC SURGERY

## 2024-08-27 PROCEDURE — 76942 ECHO GUIDE FOR BIOPSY: CPT | Performed by: ORTHOPAEDIC SURGERY

## 2024-08-27 RX ORDER — LIDOCAINE HYDROCHLORIDE 10 MG/ML
1 INJECTION INFILTRATION; PERINEURAL
Status: COMPLETED | OUTPATIENT
Start: 2024-08-27 | End: 2024-08-27

## 2024-08-27 ASSESSMENT — ENCOUNTER SYMPTOMS
ADENOPATHY: 1
JOINT SWELLING: 1
SHORTNESS OF BREATH: 0
BRUISES/BLEEDS EASILY: 1
BACK PAIN: 1
MYALGIAS: 1
COLOR CHANGE: 1
NECK STIFFNESS: 1
CHILLS: 0
FATIGUE: 0
WOUND: 0
ARTHRALGIAS: 1
NECK PAIN: 1
FEVER: 0
WHEEZING: 0

## 2024-08-27 NOTE — PROGRESS NOTES
Reason for Appointment  No chief complaint on file.    History of Present Illness  New patient is a 84 y.o. female here today for evaluation of left hand pain and numbness greater than the right she has severe carpal tunnel clinically and she comes in with family member.  She is having pain numbness it severe is been going on for a few years she is on gabapentin really bothers her.  Wants to avoid surgery at her age which is best especially a long talk about the severe atrophy more mild symptoms in the right hand does have a brace but really is not helping we had a long discussion today and they would like to try a cortisone injection but understand there are risks and hopefully this will give her some relief and the watch her closely over the next couple days.  She uses the hand for activities of daily living she has to shake the hand burning pain is her major complaint.  Past medical history allergies medication social and family history all reviewed.     Past Medical History:   Diagnosis Date    Atrial fibrillation (Multi) 06/28/2024    CVA (cerebral vascular accident) (Multi) 12/03/2010    Essential hypertension 06/28/2024    Syncope and collapse 06/28/2024       Past Surgical History:   Procedure Laterality Date    MR HEAD ANGIO WO IV CONTRAST  7/15/2012    MR HEAD ANGIO WO IV CONTRAST [facility]    MR HEAD ANGIO WO IV CONTRAST  1/11/2013    MR HEAD ANGIO WO IV CONTRAST [facility]       Medication Documentation Review Audit       Reviewed by Trent Ma MD (Physician) on 08/27/24 at 1254      Medication Order Taking? Sig Documenting Provider Last Dose Status   desmopressin (DDAVP) 0.1 mg tablet [id number] No Take 0.5 tablets (0.05 mg) by mouth 2 times a day. Bismark Guerrero, DO Taking Active   Discontinued 08/21/24 2335   gabapentin (Neurontin) 300 mg capsule [id number]  Take 1 capsule (300 mg) by mouth 3 times a day. Tae Pichardo, DO  Active   Discontinued 08/21/24 2335   metoprolol  tartrate (Lopressor) 25 mg tablet [id number]  Take 1 tablet (25 mg) by mouth 2 times a day. Tae Pichardo DO  Active                    Allergies   Allergen Reactions    Aspirin Unknown    Propoxyphene Other and Unknown     Vomiting    Salicylates Unknown     epistaxis    Allergenic Ext-Tree Pollen Other       Review of Systems   Constitutional:  Negative for chills, fatigue and fever.   Respiratory:  Negative for shortness of breath and wheezing.    Cardiovascular:  Negative for chest pain and leg swelling.   Musculoskeletal:  Positive for arthralgias, back pain, gait problem, joint swelling, myalgias, neck pain and neck stiffness.   Skin:  Positive for color change, pallor and rash. Negative for wound.   Hematological:  Positive for adenopathy. Bruises/bleeds easily.       Exam   On examination patient is alert awake in mild distress oriented person place and time mood is good she has gait issues and stiffness globally in the neck shoulders elbows wrist and hand with significant arthritic changes there is severe thenar atrophy left greater than the right with positive Tinel's over both median nerves left greater than the right arthritic changes are seen thin skin is seen.  Good capillary refill  Assessment   Left wrist severe carpal tunnel syndrome  Plan   We sterilely injected the left carpal tunnel today patient did extremely well family member understands risks and watch for any complications as discussed in addition we talked about seeing her back in 6 to 8 weeks and we will see how much benefit she gets and discussed future injection and even future surgery but we would like to avoid if possible  Patient ID: Sol Gomez is a 84 y.o. female.    Hand / UE Inj/Asp for carpal tunnel syndrome on 8/27/2024 12:57 PM  Indications: pain  Details: 25 G needle, ultrasound-guided  Medications: 1 mL lidocaine 10 mg/mL (1 %)  Outcome: tolerated well, no immediate complications    After discussing the risks and  benefits of the procedure we proceeded with the injection. Using ultrasound guidance we identified the median nerve and the ulnar artery, images obtained. We gently aspirated and sterilely injected a mixture of 30 mg of DepoMedrol and 1 cc of 1% lidocaine into the left carpal tunnel. Pt tolerated the procedure well without any adverse effects.    Procedure, treatment alternatives, risks and benefits explained, specific risks discussed. Consent was given by the patient. Immediately prior to procedure a time out was called to verify the correct patient, procedure, equipment, support staff and site/side marked as required. Patient was prepped and draped in the usual sterile fashion.

## 2024-08-29 DIAGNOSIS — E87.1 HYPONATREMIA: ICD-10-CM

## 2024-08-29 RX ORDER — DESMOPRESSIN ACETATE 0.1 MG/1
0.05 TABLET ORAL 3 TIMES DAILY
Qty: 270 TABLET | Refills: 2 | OUTPATIENT
Start: 2024-08-29 | End: 2024-11-27

## 2024-09-09 ENCOUNTER — TELEPHONE (OUTPATIENT)
Dept: PRIMARY CARE | Facility: CLINIC | Age: 84
End: 2024-09-09
Payer: MEDICARE

## 2024-09-09 NOTE — TELEPHONE ENCOUNTER
Daughter called , patient sees anaid on 10/27 and daughter didn't know if she should have her Sodium rechecked before that appt.

## 2024-09-25 ENCOUNTER — TELEPHONE (OUTPATIENT)
Dept: PRIMARY CARE | Facility: CLINIC | Age: 84
End: 2024-09-25
Payer: MEDICARE

## 2024-09-25 NOTE — TELEPHONE ENCOUNTER
Physical therapist called to let you know patient had a recent fall.  They also wanted you to know they recertified for additional physical therapy.

## 2024-10-03 NOTE — PROGRESS NOTES
HPI   85 yo presents as new pt for hospital follow up for diabetes insipidus.  Pt also with multiple CVA's, htn, afib.  Pt's daughter, healthcare POA is here and assisting with history.      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  From pt's history it sounds that she had an in office water deprivation test that was positive.  It also sounds as if she had imaging of her pituitary and was started on DDAVP 0.1mg bid.  She has been stable on this dosage for years.  She only drinks fluids when thirsty.  She typically has to urinate once overnight.    Pt was hospitalized summer 2024 after syncopal episode/mental status change.  Found to have sodium-125 and a UTI.  Neuro/nephro were consulted on pt's case.  DDAVP was held until sodium normalized.  It was added back at 1/2 0.1mg bid ddavp dosage.  After discharge pt was having DI sx, dose increased to 1/2 0.1mg pill tid.  Repeat labs shortly after show sodium-140 aug 9, 2024.  Pt then went back to long term dose of ddavp of 0.1mg bid and has been at baseline.      Medical History        Past Medical History:   Diagnosis Date    Atrial fibrillation (Multi) 06/28/2024    CVA (cerebral vascular accident) (Multi) 12/03/2010    Essential hypertension 06/28/2024    Syncope and collapse 06/28/2024         Surgical History         Past Surgical History:   Procedure Laterality Date    MR HEAD ANGIO WO IV CONTRAST   7/15/2012     MR HEAD ANGIO WO IV CONTRAST [facility]    MR HEAD ANGIO WO IV CONTRAST   1/11/2013     MR HEAD ANGIO WO IV CONTRAST [facility]         Social History   Social History            Tobacco Use    Smoking status: Never    Smokeless tobacco: Never   Vaping Use    Vaping status: Never Used   Substance Use Topics    Alcohol use: Not Currently       Alcohol/week: 1.0 standard drink of alcohol       Types: 1 Glasses of wine per week    Drug use: Never         FH:  Mom-no chronic issues  Dad-cvd      Current  Outpatient Medications:     desmopressin (DDAVP) 0.1 mg tablet, Take 0.5 tablets (0.05 mg) by mouth 2 times a day., Disp: , Rfl:     gabapentin (Neurontin) 300 mg capsule, Take 1 capsule (300 mg) by mouth 3 times a day., Disp: 270 capsule, Rfl: 0    magnesium, amino acid chelate, 133 mg tablet, Take 1 tablet (133 mg) by mouth 2 times a day., Disp: , Rfl:     metoprolol tartrate (Lopressor) 25 mg tablet, Take 1 tablet (25 mg) by mouth 2 times a day., Disp: 180 tablet, Rfl: 2      Allergies as of 10/04/2024 - Reviewed 10/04/2024   Allergen Reaction Noted    Aspirin Unknown 06/19/2024    Propoxyphene Other and Unknown 07/15/2019    Salicylates Unknown 01/16/2007    Allergenic ext-tree pollen Other 06/28/2024         Review of Systems   Cardiology: Lightheadedness +.  Chest pain-denies.  Leg edema-denies.  Palpitations-denies.  Respiratory: Cough-denies. Shortness of breath-denies.  Wheezing-denies.  Gastroenterology: Constipation-denies.  Diarrhea-denies.  Heartburn-denies.  Endocrinology: Cold intolerance-denies.  Heat intolerance-denies.  Sweats-denies.  Neurology: Headache-denies.  Tremor-denies.  Neuropathy in extremities-denies.  Psychology: Low energy +.  Irritability-denies.  Sleep disturbances-denies.      /60 (BP Location: Left arm, Patient Position: Sitting, BP Cuff Size: Adult)   Pulse 58   Wt 67.6 kg (149 lb)   BMI 24.79 kg/m²       Labs:  Lab Results   Component Value Date    WBC 7.8 08/09/2024    NRBC 0.0 08/09/2024    RBC 4.05 08/09/2024    HGB 11.9 (L) 08/09/2024    HCT 37.6 08/09/2024     (L) 08/09/2024     Lab Results   Component Value Date    CALCIUM 9.0 08/09/2024    AST 16 08/09/2024    ALKPHOS 81 08/09/2024    BILITOT 0.5 08/09/2024    PROT 6.4 08/09/2024    ALBUMIN 4.3 08/09/2024     08/09/2024    K 4.4 08/09/2024     08/09/2024    CO2 30 08/09/2024    ANIONGAP 11 08/09/2024    BUN 22 08/09/2024    CREATININE 0.67 08/09/2024    UREACREAUR 31.7 (H) 01/10/2020    GLUCOSE  "94 08/09/2024    ALT 13 08/09/2024    EGFR 86 08/09/2024     Lab Results   Component Value Date    CHOL 201 (H) 06/27/2024    TRIG 118 06/27/2024    HDL 62.7 06/27/2024    LDLCALC 115 (H) 06/27/2024     No results found for: \"MICROALBCREA\"  Lab Results   Component Value Date    TSH 2.19 06/27/2024     Lab Results   Component Value Date    [id number] 480 06/27/2024     Lab Results   Component Value Date    HGBA1C 5.3 06/27/2024         Physical Exam   General Appearance: pleasant, cooperative, no acute distress  HEENT: no chemosis, no proptosis, no lid lag, no lid retraction  Neck: no lymphadenopathy, no thyromegaly, no dominant thyroid nodules  Heart: no murmurs, regular rate and rhythm, S1 and S2  Lungs: no wheezes, no rhonci, no rales  Extremities: no lower extremity swelling      Assessment/Plan   1. Diabetes insipidus  -use ddavp 0.1mg bid  -titrate so pt only urinates 0-1 times overnight  -only drink when thirsty  -repeat bmp in the next week  -suspect infection complicated case leading to severe hyponatremia    -if any signs of confusion have bmp and UA done stat  -if for some reason pt needs to go inpt, go to facility with endocrinology consults available and or call this office to discuss case    Follow Up:  Giuseppe 6 months, if stable then yearly    -labs/tests/notes reviewed  -reviewed and counseled patient on medication monitoring and side effects          "

## 2024-10-04 ENCOUNTER — APPOINTMENT (OUTPATIENT)
Dept: ENDOCRINOLOGY | Facility: CLINIC | Age: 84
End: 2024-10-04
Payer: MEDICARE

## 2024-10-04 VITALS
WEIGHT: 149 LBS | HEART RATE: 58 BPM | SYSTOLIC BLOOD PRESSURE: 132 MMHG | BODY MASS INDEX: 24.79 KG/M2 | DIASTOLIC BLOOD PRESSURE: 60 MMHG

## 2024-10-04 DIAGNOSIS — E87.1 HYPONATREMIA: ICD-10-CM

## 2024-10-04 DIAGNOSIS — E23.2 DIABETES INSIPIDUS: Primary | ICD-10-CM

## 2024-10-04 PROCEDURE — 1126F AMNT PAIN NOTED NONE PRSNT: CPT | Performed by: INTERNAL MEDICINE

## 2024-10-04 PROCEDURE — 1036F TOBACCO NON-USER: CPT | Performed by: INTERNAL MEDICINE

## 2024-10-04 PROCEDURE — 3075F SYST BP GE 130 - 139MM HG: CPT | Performed by: INTERNAL MEDICINE

## 2024-10-04 PROCEDURE — 99203 OFFICE O/P NEW LOW 30 MIN: CPT | Performed by: INTERNAL MEDICINE

## 2024-10-04 PROCEDURE — 1159F MED LIST DOCD IN RCRD: CPT | Performed by: INTERNAL MEDICINE

## 2024-10-04 PROCEDURE — 3078F DIAST BP <80 MM HG: CPT | Performed by: INTERNAL MEDICINE

## 2024-10-04 RX ORDER — DESMOPRESSIN ACETATE 0.1 MG/1
TABLET ORAL
Qty: 180 TABLET | Refills: 3 | Status: SHIPPED | OUTPATIENT
Start: 2024-10-04

## 2024-10-04 ASSESSMENT — PAIN SCALES - GENERAL: PAINLEVEL: 0-NO PAIN

## 2024-10-04 ASSESSMENT — ENCOUNTER SYMPTOMS
OCCASIONAL FEELINGS OF UNSTEADINESS: 1
LOSS OF SENSATION IN FEET: 1
DEPRESSION: 0

## 2024-10-08 ENCOUNTER — LAB (OUTPATIENT)
Dept: LAB | Facility: LAB | Age: 84
End: 2024-10-08
Payer: MEDICARE

## 2024-10-08 DIAGNOSIS — E23.2 DIABETES INSIPIDUS: ICD-10-CM

## 2024-10-08 LAB
ANION GAP SERPL CALC-SCNC: 10 MMOL/L (ref 10–20)
BUN SERPL-MCNC: 25 MG/DL (ref 6–23)
CALCIUM SERPL-MCNC: 9.1 MG/DL (ref 8.6–10.3)
CHLORIDE SERPL-SCNC: 102 MMOL/L (ref 98–107)
CO2 SERPL-SCNC: 31 MMOL/L (ref 21–32)
CREAT SERPL-MCNC: 0.74 MG/DL (ref 0.5–1.05)
EGFRCR SERPLBLD CKD-EPI 2021: 80 ML/MIN/1.73M*2
GLUCOSE SERPL-MCNC: 97 MG/DL (ref 74–99)
POTASSIUM SERPL-SCNC: 4.4 MMOL/L (ref 3.5–5.3)
SODIUM SERPL-SCNC: 139 MMOL/L (ref 136–145)

## 2024-10-08 PROCEDURE — 36415 COLL VENOUS BLD VENIPUNCTURE: CPT

## 2024-10-08 PROCEDURE — 80048 BASIC METABOLIC PNL TOTAL CA: CPT

## 2024-10-09 ENCOUNTER — TELEPHONE (OUTPATIENT)
Dept: PRIMARY CARE | Facility: CLINIC | Age: 84
End: 2024-10-09
Payer: MEDICARE

## 2024-10-09 DIAGNOSIS — N39.0 URINARY TRACT INFECTION WITHOUT HEMATURIA, SITE UNSPECIFIED: Primary | ICD-10-CM

## 2024-10-09 NOTE — TELEPHONE ENCOUNTER
Patient wanted to know if she could get an order for UA.   When she gets them its not normal symptoms, she just feels off.  She did talk to Endo because they had ordered other labs and they said that would be a good idea to check with her PCP.

## 2024-10-11 ENCOUNTER — OFFICE VISIT (OUTPATIENT)
Dept: ORTHOPEDIC SURGERY | Facility: HOSPITAL | Age: 84
End: 2024-10-11
Payer: MEDICARE

## 2024-10-11 ENCOUNTER — LAB (OUTPATIENT)
Dept: LAB | Facility: LAB | Age: 84
End: 2024-10-11
Payer: MEDICARE

## 2024-10-11 DIAGNOSIS — N39.0 URINARY TRACT INFECTION WITHOUT HEMATURIA, SITE UNSPECIFIED: ICD-10-CM

## 2024-10-11 DIAGNOSIS — G56.02 LEFT CARPAL TUNNEL SYNDROME: Primary | ICD-10-CM

## 2024-10-11 PROCEDURE — 87086 URINE CULTURE/COLONY COUNT: CPT

## 2024-10-11 PROCEDURE — 1125F AMNT PAIN NOTED PAIN PRSNT: CPT | Performed by: ORTHOPAEDIC SURGERY

## 2024-10-11 PROCEDURE — 99213 OFFICE O/P EST LOW 20 MIN: CPT | Performed by: ORTHOPAEDIC SURGERY

## 2024-10-11 PROCEDURE — 20526 THER INJECTION CARP TUNNEL: CPT | Performed by: ORTHOPAEDIC SURGERY

## 2024-10-11 PROCEDURE — 2500000004 HC RX 250 GENERAL PHARMACY W/ HCPCS (ALT 636 FOR OP/ED): Performed by: ORTHOPAEDIC SURGERY

## 2024-10-11 PROCEDURE — 87186 SC STD MICRODIL/AGAR DIL: CPT

## 2024-10-11 PROCEDURE — 1160F RVW MEDS BY RX/DR IN RCRD: CPT | Performed by: ORTHOPAEDIC SURGERY

## 2024-10-11 PROCEDURE — 76942 ECHO GUIDE FOR BIOPSY: CPT | Performed by: ORTHOPAEDIC SURGERY

## 2024-10-11 PROCEDURE — 87077 CULTURE AEROBIC IDENTIFY: CPT

## 2024-10-11 PROCEDURE — 1036F TOBACCO NON-USER: CPT | Performed by: ORTHOPAEDIC SURGERY

## 2024-10-11 PROCEDURE — 1159F MED LIST DOCD IN RCRD: CPT | Performed by: ORTHOPAEDIC SURGERY

## 2024-10-11 RX ORDER — METHYLPREDNISOLONE ACETATE 40 MG/ML
30 INJECTION, SUSPENSION INTRA-ARTICULAR; INTRALESIONAL; INTRAMUSCULAR; SOFT TISSUE
Status: COMPLETED | OUTPATIENT
Start: 2024-10-11 | End: 2024-10-11

## 2024-10-11 RX ORDER — LIDOCAINE HYDROCHLORIDE 10 MG/ML
1 INJECTION, SOLUTION INFILTRATION; PERINEURAL
Status: COMPLETED | OUTPATIENT
Start: 2024-10-11 | End: 2024-10-11

## 2024-10-11 ASSESSMENT — ENCOUNTER SYMPTOMS
EYE DISCHARGE: 0
WHEEZING: 0
SHORTNESS OF BREATH: 0
SINUS PAIN: 0
ARTHRALGIAS: 1
TROUBLE SWALLOWING: 0
CHILLS: 0
FEVER: 0
JOINT SWELLING: 1

## 2024-10-11 ASSESSMENT — PAIN - FUNCTIONAL ASSESSMENT: PAIN_FUNCTIONAL_ASSESSMENT: 0-10

## 2024-10-11 ASSESSMENT — PAIN SCALES - GENERAL: PAINLEVEL_OUTOF10: 6

## 2024-10-11 NOTE — PROGRESS NOTES
Reason for Appointment  Chief Complaint   Patient presents with    Left Wrist - Pain     History of Present Illness  Patient is a 84 y.o. female here today for follow-up evaluation of recurrent left hand pain.  She has severe bilateral carpal tunnel syndrome on clinical exam severe thenar atrophy.  Recent left carpal tunnel injection done about 6 weeks ago did give her some improvement in pain but she still has significant numbness in the fingertips.  We discussed with the patient and family today that she likely has permanent damage to the nerve and even surgical release would likely not help her symptoms in terms of numbness in the fingers, but may help the pain in the hand.  The patient and family would like another carpal tunnel injection today no other changes in her past medical history, allergies, medications    Past Medical History:   Diagnosis Date    Atrial fibrillation (Multi) 06/28/2024    CVA (cerebral vascular accident) (Multi) 12/03/2010    Diabetes insipidus 07/26/2024    FROM REFERRAL    Essential hypertension 06/28/2024    Syncope and collapse 06/28/2024       Past Surgical History:   Procedure Laterality Date    MR HEAD ANGIO WO IV CONTRAST  07/15/2012    MR HEAD ANGIO WO IV CONTRAST [facility]    MR HEAD ANGIO WO IV CONTRAST  01/11/2013    MR HEAD ANGIO WO IV CONTRAST [facility]    SKIN CANCER EXCISION         Medication Documentation Review Audit       Reviewed by Kristie Rosenthal PA-C (Physician Assistant) on 10/11/24 at 1052      Medication Order Taking? Sig Documenting Provider Last Dose Status   desmopressin (DDAVP) 0.1 mg tablet [id number] Yes 0.1mg po bid Manny Chin MD Taking Active   gabapentin (Neurontin) 300 mg capsule [id number] Yes Take 1 capsule (300 mg) by mouth 3 times a day. Tae Pichardo DO Taking Active   magnesium, amino acid chelate, 133 mg tablet [id number] Yes Take 1 tablet (133 mg) by mouth 2 times a day. Historical Provider, MD Taking Active    metoprolol tartrate (Lopressor) 25 mg tablet [id number] Yes Take 1 tablet (25 mg) by mouth 2 times a day. Tae Pichardo, DO Taking Active                    Allergies   Allergen Reactions    Aspirin Unknown    Propoxyphene Other and Unknown     Vomiting    Salicylates Unknown     epistaxis    Allergenic Ext-Tree Pollen Other     Review of Systems   Constitutional:  Negative for chills and fever.   HENT:  Negative for mouth sores, sinus pain and trouble swallowing.    Eyes:  Negative for discharge.   Respiratory:  Negative for shortness of breath and wheezing.    Cardiovascular:  Negative for chest pain.   Musculoskeletal:  Positive for arthralgias and joint swelling.   All other systems reviewed and are negative.    Exam   On exam bilateral hands show DJD and she has severe thenar atrophy in both hands.  Minimally positive Tinel's over the bilateral median nerves.  Decreased composite flexion in the digits and decree sensation to touch in the median nerve distribution.  Good pulses and capillary refill in the upper extremities    Assessment   Left carpal tunnel syndrome    Plan   The patient and family would like a repeat carpal tunnel injection today.  We stressed to them the importance of spacing these injections out going forward on a 3 to 6-month basis.  She did get some pain relief improvement with the injection.  We sterilely injected under ultrasound guidance Depo-Medrol lidocaine into the left carpal tunnel.  Patient understands the small risk of infection and the signs to look for as well as flare action.  Hopefully this gives her good relief.  She can follow-up with us on a 3 to 6-month basis for injections.    Hand / UE Inj/Asp: L carpal tunnel for carpal tunnel syndrome on 10/11/2024 11:19 AM  Indications: pain  Details: 25 G needle, ultrasound-guided  Medications: 1 mL lidocaine 10 mg/mL (1 %); 30 mg methylPREDNISolone acetate 40 mg/mL  Outcome: tolerated well, no immediate complications    After  discussing the risks and benefits of the procedure we proceeded with the injection. Using ultrasound guidance we identified the median nerve and the ulnar artery, images obtained. We gently aspirated and sterilely injected a mixture of 30 mg of DepoMedrol and 1 cc of 1% lidocaine into the left carpal tunnel. Pt tolerated the procedure well without any adverse effects.    Procedure, treatment alternatives, risks and benefits explained, specific risks discussed. Consent was given by the patient. Immediately prior to procedure a time out was called to verify the correct patient, procedure, equipment, support staff and site/side marked as required. Patient was prepped and draped in the usual sterile fashion.       Written by Kristie Ma saw, evaluated, and treated the patient with the PA

## 2024-10-13 LAB — BACTERIA UR CULT: ABNORMAL

## 2024-10-14 DIAGNOSIS — N39.0 URINARY TRACT INFECTION WITHOUT HEMATURIA, SITE UNSPECIFIED: Primary | ICD-10-CM

## 2024-10-14 RX ORDER — NITROFURANTOIN 25; 75 MG/1; MG/1
100 CAPSULE ORAL 2 TIMES DAILY
Qty: 10 CAPSULE | Refills: 0 | Status: SHIPPED | OUTPATIENT
Start: 2024-10-14 | End: 2024-10-19

## 2024-10-15 LAB — BACTERIA UR CULT: ABNORMAL

## 2024-11-06 ENCOUNTER — APPOINTMENT (OUTPATIENT)
Dept: OTOLARYNGOLOGY | Facility: CLINIC | Age: 84
End: 2024-11-06
Payer: MEDICARE

## 2024-11-14 ENCOUNTER — APPOINTMENT (OUTPATIENT)
Dept: OTOLARYNGOLOGY | Facility: CLINIC | Age: 84
End: 2024-11-14
Payer: MEDICARE

## 2024-11-14 VITALS — TEMPERATURE: 96.8 F | HEIGHT: 65 IN | BODY MASS INDEX: 24.79 KG/M2

## 2024-11-14 DIAGNOSIS — H91.8X3 OTHER SPECIFIED HEARING LOSS, BILATERAL: ICD-10-CM

## 2024-11-14 DIAGNOSIS — R42 DIZZINESS AND GIDDINESS: ICD-10-CM

## 2024-11-14 DIAGNOSIS — R26.89 IMBALANCE: Primary | ICD-10-CM

## 2024-11-14 PROCEDURE — 1036F TOBACCO NON-USER: CPT | Performed by: OTOLARYNGOLOGY

## 2024-11-14 PROCEDURE — 99203 OFFICE O/P NEW LOW 30 MIN: CPT | Performed by: OTOLARYNGOLOGY

## 2024-11-14 PROCEDURE — 1159F MED LIST DOCD IN RCRD: CPT | Performed by: OTOLARYNGOLOGY

## 2024-11-14 NOTE — PROGRESS NOTES
Chief Complaint   Patient presents with    New Patient Visit     OFF BALANCE     HPI:  Sol Gomez is a 84 y.o. female who presents with her son today with many many year history of problems with her balance.  Initially was using a cane and now using a walker.  She has fallen on occasion.  She has no sensation of movement or vertigo.  She does have bilateral hearing loss and wears hearing aids.  Denies tinnitus, pressure, pain.  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  Denies any neuropathy.  She does have a tremor.    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    FROM REFERRAL    Essential hypertension 06/28/2024    Syncope and collapse 06/28/2024     Past Surgical History:   Procedure Laterality Date    EYE SURGERY      MR HEAD ANGIO WO IV CONTRAST  07/15/2012    MR HEAD ANGIO WO IV CONTRAST [facility]    MR HEAD ANGIO WO IV CONTRAST  01/11/2013    MR HEAD ANGIO WO IV CONTRAST [facility]    SKIN CANCER EXCISION           Medications:     Current Outpatient Medications:     desmopressin (DDAVP) 0.1 mg tablet, 0.1mg po bid, Disp: 180 tablet, Rfl: 3    gabapentin (Neurontin) 300 mg capsule, Take 1 capsule (300 mg) by mouth 3 times a day., Disp: 270 capsule, Rfl: 0    magnesium, amino acid chelate, 133 mg tablet, Take 1 tablet (133 mg) by mouth 2 times a day., Disp: , Rfl:     metoprolol tartrate (Lopressor) 25 mg tablet, Take 1 tablet (25 mg) by mouth 2 times a day., Disp: 180 tablet, Rfl: 2     Allergies:  Allergies   Allergen Reactions    Aspirin Unknown    Propoxyphene Other and Unknown     Vomiting    Salicylates Unknown     epistaxis    Allergenic Ext-Tree Pollen Other        ROS:  Review of systems normal unless stated otherwise in the HPI and/or PMH.    Physical  "Exam:  Temperature 36 °C (96.8 °F), height 1.651 m (5' 5\"). Body mass index is 24.79 kg/m².     GENERAL APPEARANCE: Well developed and well nourished.  Alert and oriented in no acute distress.  Normal vocal quality.      HEAD/FACE: No erythema or edema or facial tenderness.  Normal facial nerve function bilaterally.    EAR:       EXTERNAL: Normal pinnas and external auditory canals without lesion or obstructing wax.       MIDDLE EAR: Tympanic membranes intact and mobile with normal landmarks.  Middle ear space appears well aerated.       TUBE STATUS: N/A       MASTOID CAVITY: N/A       HEARING: Gross hearing assessment is within normal limits.      NOSE:       VISUALIZED USING: Anterior rhinoscopy with headlight and nasal speculum.       DORSUM: Midline, nontraumatic appearance.       MUCOSA: Normal-appearing.       SECRETIONS: Normal.       SEPTUM: Midline and nonobstructing.       INFERIOR TURBINATES: Normal.       MIDDLE TURBINATES/MEATUS: N/A       BLEEDING: N/A         ORAL CAVITY/PHARYNX:       TEETH: Adequate dentition.       TONGUE: No mass or lesion.  Normal mobility.       FLOOR OF MOUTH: No mass or lesion.       PALATE: Normal hard palate, soft palate, and uvula.       OROPHARYNX: Normal without mass or lesion.       BUCCAL MUCOSA/GBS: Normal without mass or lesion.       LIPS: Normal.    LARYNX/HYPOPHARYNX/NASOPHARYNX: N/A    NECK: No palpable masses or abnormal adenopathy.  Trachea is midline.    THYROID: No thyromegaly or palpable nodule.    SALIVARY GLANDS: Normal bilateral parotid and submandibular glands by inspection and palpation.    TMJ's: Normal.    NEURO: Cranial nerve exam grossly normal bilaterally.       Assessment/Plan   Sol was seen today for new patient visit.  Diagnoses and all orders for this visit:  Imbalance (Primary)  -     Referral to Physical Therapy; Future  Dizziness and giddiness  Other specified hearing loss, bilateral     I believe that her imbalance is likely " multifactorial.  I do not believe there is any significant inner ear pathology or vertigo.  I think the best thing we can do for her is some very specialized balance therapy to help prevent things from getting worse and hopefully help things get better.  Follow up if symptoms worsen or fail to improve.     Bismark Salinas MD

## 2024-12-02 ENCOUNTER — APPOINTMENT (OUTPATIENT)
Dept: NEPHROLOGY | Facility: CLINIC | Age: 84
End: 2024-12-02
Payer: MEDICARE

## 2024-12-04 ENCOUNTER — TELEPHONE (OUTPATIENT)
Dept: PRIMARY CARE | Facility: CLINIC | Age: 84
End: 2024-12-04
Payer: MEDICARE

## 2024-12-26 DIAGNOSIS — E87.1 HYPONATREMIA: ICD-10-CM

## 2024-12-26 RX ORDER — GABAPENTIN 300 MG/1
300 CAPSULE ORAL 3 TIMES DAILY
Qty: 90 CAPSULE | Refills: 0 | Status: SHIPPED | OUTPATIENT
Start: 2024-12-26

## 2025-01-03 ENCOUNTER — APPOINTMENT (OUTPATIENT)
Dept: PRIMARY CARE | Facility: CLINIC | Age: 85
End: 2025-01-03
Payer: MEDICARE

## 2025-01-03 ENCOUNTER — LAB (OUTPATIENT)
Dept: LAB | Facility: LAB | Age: 85
End: 2025-01-03
Payer: MEDICARE

## 2025-01-03 VITALS
HEIGHT: 63 IN | OXYGEN SATURATION: 98 % | HEART RATE: 65 BPM | WEIGHT: 168 LBS | SYSTOLIC BLOOD PRESSURE: 130 MMHG | BODY MASS INDEX: 29.77 KG/M2 | DIASTOLIC BLOOD PRESSURE: 82 MMHG

## 2025-01-03 DIAGNOSIS — M79.602 ARM PAIN, LATERAL, LEFT: ICD-10-CM

## 2025-01-03 DIAGNOSIS — I10 ESSENTIAL HYPERTENSION: ICD-10-CM

## 2025-01-03 DIAGNOSIS — M25.512 ACUTE PAIN OF LEFT SHOULDER: ICD-10-CM

## 2025-01-03 DIAGNOSIS — E87.1 HYPONATREMIA: ICD-10-CM

## 2025-01-03 DIAGNOSIS — E23.2 DIABETES INSIPIDUS: Primary | ICD-10-CM

## 2025-01-03 DIAGNOSIS — I48.20 CHRONIC ATRIAL FIBRILLATION (MULTI): ICD-10-CM

## 2025-01-03 LAB
ALBUMIN SERPL BCP-MCNC: 4.2 G/DL (ref 3.4–5)
ALP SERPL-CCNC: 72 U/L (ref 33–136)
ALT SERPL W P-5'-P-CCNC: 11 U/L (ref 7–45)
ANION GAP SERPL CALC-SCNC: 12 MMOL/L (ref 10–20)
AST SERPL W P-5'-P-CCNC: 14 U/L (ref 9–39)
BILIRUB SERPL-MCNC: 0.5 MG/DL (ref 0–1.2)
BUN SERPL-MCNC: 19 MG/DL (ref 6–23)
CALCIUM SERPL-MCNC: 9.2 MG/DL (ref 8.6–10.3)
CHLORIDE SERPL-SCNC: 101 MMOL/L (ref 98–107)
CO2 SERPL-SCNC: 31 MMOL/L (ref 21–32)
CREAT SERPL-MCNC: 0.67 MG/DL (ref 0.5–1.05)
EGFRCR SERPLBLD CKD-EPI 2021: 86 ML/MIN/1.73M*2
GLUCOSE SERPL-MCNC: 90 MG/DL (ref 74–99)
POTASSIUM SERPL-SCNC: 4.3 MMOL/L (ref 3.5–5.3)
PROT SERPL-MCNC: 6.8 G/DL (ref 6.4–8.2)
SODIUM SERPL-SCNC: 140 MMOL/L (ref 136–145)

## 2025-01-03 PROCEDURE — 3075F SYST BP GE 130 - 139MM HG: CPT

## 2025-01-03 PROCEDURE — 1036F TOBACCO NON-USER: CPT

## 2025-01-03 PROCEDURE — 1159F MED LIST DOCD IN RCRD: CPT

## 2025-01-03 PROCEDURE — 99214 OFFICE O/P EST MOD 30 MIN: CPT

## 2025-01-03 PROCEDURE — 3079F DIAST BP 80-89 MM HG: CPT

## 2025-01-03 PROCEDURE — 80053 COMPREHEN METABOLIC PANEL: CPT

## 2025-01-03 RX ORDER — GABAPENTIN 300 MG/1
300 CAPSULE ORAL 3 TIMES DAILY
Qty: 90 CAPSULE | Refills: 3 | Status: SHIPPED | OUTPATIENT
Start: 2025-01-03

## 2025-01-03 RX ORDER — METOPROLOL TARTRATE 25 MG/1
25 TABLET, FILM COATED ORAL 2 TIMES DAILY
Qty: 180 TABLET | Refills: 1 | Status: SHIPPED | OUTPATIENT
Start: 2025-01-03 | End: 2025-07-02

## 2025-01-03 ASSESSMENT — PATIENT HEALTH QUESTIONNAIRE - PHQ9
2. FEELING DOWN, DEPRESSED OR HOPELESS: NOT AT ALL
1. LITTLE INTEREST OR PLEASURE IN DOING THINGS: NOT AT ALL
SUM OF ALL RESPONSES TO PHQ9 QUESTIONS 1 AND 2: 0

## 2025-01-03 NOTE — PROGRESS NOTES
"Subjective   Patient ID: Sol Gomez is a 84 y.o. female who presents for Follow-up (Wants blood work done before she leaves for [place] ).    HPI   Sol is an 84-year-old female with past medical history of hypertension, atrial fibrillation, diabetes insipidus, and hand tremors presenting to the office today for a follow-up visit.  Patient reports she is leaving for [place] next week and would like to get her sodium levels rechecked prior to leaving.  She reports that her sodium level fell to 122 in 7/2024, but that repeat sodium levels have been all within normal limits since.  She has been on 0.1 mg desmopressin daily for her diabetes insipidus for several years now, follows with Dr. Chin endocrinology.  She reports gabapentin has been helping with her hand tremors, but they do still persist.  She also reports that was cleaning her floors recently and feels she may have overdone it as her left shoulder and left upper arm have been sore ever since. Is requesting refills for metoprolol and gabapentin. She otherwise denies any recent falls, dizziness, LH, chest pain, palpitations, SOB, abdominal pain, or urinary symptoms.       Review of Systems  All pertinent positive symptoms are included in the history of present illness.  All other systems have been reviewed and are negative and noncontributory to this patient's current ailments.       Objective   /82   Pulse 65   Ht 1.6 m (5' 3\")   Wt 76.2 kg (168 lb)   SpO2 98%   BMI 29.76 kg/m²     Physical Exam  CONSTITUTIONAL - well nourished, well developed, looks like stated age, in no acute distress, not ill-appearing, and not tired appearing  SKIN - normal skin color and pigmentation, normal skin turgor without rash, lesions, or nodules visualized  HEAD - no trauma, normocephalic  EYES - normal external exam  CHEST - clear to auscultation, no wheezing, no crackles and no rales, good effort  CARDIAC - regular rate and regular rhythm, no skipped " beats, no murmur  EXTREMITIES - no obvious or evident edema, no obvious or evident deformities. L shoulder slightly limited ROM in flexion, abduction.  NEUROLOGICAL - alert, oriented and no focal signs  PSYCHIATRIC - alert, pleasant and cordial, age-appropriate    Assessment/Plan   Problem List Items Addressed This Visit             ICD-10-CM    Atrial fibrillation (Multi) I48.91    Relevant Medications    metoprolol tartrate (Lopressor) 25 mg tablet    Essential hypertension I10    Diabetes insipidus - Primary E23.2     Other Visit Diagnoses         Codes    Hyponatremia     E87.1    Relevant Medications    metoprolol tartrate (Lopressor) 25 mg tablet    gabapentin (Neurontin) 300 mg capsule    Other Relevant Orders    Comprehensive Metabolic Panel          Hyponatremia   - Will order CMP to recheck sodium levels at this time, will contact patient with results and any potential recommendations       L shoulder/upper arm pain   - Suspect overuse injury vs osteoarthritis   - Can take Tylenol, alternate between applying heat/ice to affected area  - Pt opts to defer imaging until after returning from [place] in May      Atrial fibrillation/HTN  - /82 in office today, controlled on current medication regimen  - No chest pain palpitations, SOB, lightheadedness  - Metoprolol refills sent to patient's preferred pharmacy        Hand tremors  - Stable on gabapentin, refills sent to patient's preferred pharmacy       Patient understands and is agreeable with current plan. All questions were answered at this visit. Please follow up with the office if any additional questions arise.        Please follow up as needed with any acute concerns, otherwise follow up at your regularly scheduled appointment.       Johnny Peres DO  PGY-1, Family Medicine

## 2025-01-04 NOTE — PROGRESS NOTES
I reviewed and examined the patient. I was present for the key exam elements, and I fully participated in the patient's care. I discussed the management of the care with the resident. I have personally reviewed the pertinent labs and imaging, as well as recent notes, with the patient. I have reviewed the note above and agree with the resident's medical decision making as documented in the resident's note, in addition to the following comments / findings:     Agree with the rest of the plan outlined below by resident physician. No red flags.      The patient understands and agrees to the assessment and plan of care. Patient has also agreed to follow up and comply with the treatment and evaluation as recommended today. Patient was instructed to call the office at [phone] should questions arise regarding their treatment or care.     Tae Pichardo DO, FAOASM  Family Medicine   [address], [address]  [place] [address]     Tae Pichardo DO

## 2025-01-15 ENCOUNTER — APPOINTMENT (OUTPATIENT)
Dept: NEUROLOGY | Facility: CLINIC | Age: 85
End: 2025-01-15
Payer: MEDICARE

## 2025-03-12 ENCOUNTER — TELEPHONE (OUTPATIENT)
Dept: ENDOCRINOLOGY | Facility: CLINIC | Age: 85
End: 2025-03-12
Payer: MEDICARE

## 2025-03-13 ENCOUNTER — TELEPHONE (OUTPATIENT)
Dept: PRIMARY CARE | Facility: CLINIC | Age: 85
End: 2025-03-13
Payer: MEDICARE

## 2025-04-24 ENCOUNTER — TELEPHONE (OUTPATIENT)
Dept: ENDOCRINOLOGY | Facility: CLINIC | Age: 85
End: 2025-04-24
Payer: MEDICARE

## 2025-04-24 NOTE — TELEPHONE ENCOUNTER
Called Sol to tell her about our office moving; however, I did not get an answer ; therefore, I sent her a patient appt reminder along with our new location information.

## 2025-05-13 DIAGNOSIS — E87.1 HYPONATREMIA: ICD-10-CM

## 2025-05-13 RX ORDER — METOPROLOL TARTRATE 25 MG/1
25 TABLET, FILM COATED ORAL 2 TIMES DAILY
Qty: 180 TABLET | Refills: 0 | Status: SHIPPED | OUTPATIENT
Start: 2025-05-13

## 2025-05-14 ENCOUNTER — TELEPHONE (OUTPATIENT)
Facility: CLINIC | Age: 85
End: 2025-05-14
Payer: MEDICARE

## 2025-05-14 DIAGNOSIS — G62.9 NEUROPATHY: ICD-10-CM

## 2025-05-14 DIAGNOSIS — R73.03 PRE-DIABETES: Primary | ICD-10-CM

## 2025-05-14 DIAGNOSIS — I15.9 SECONDARY HYPERTENSION: ICD-10-CM

## 2025-05-14 DIAGNOSIS — E78.5 HYPERLIPIDEMIA, UNSPECIFIED HYPERLIPIDEMIA TYPE: ICD-10-CM

## 2025-06-07 LAB
ALBUMIN SERPL-MCNC: 4.4 G/DL (ref 3.6–5.1)
ALP SERPL-CCNC: 60 U/L (ref 37–153)
ALT SERPL-CCNC: 13 U/L (ref 6–29)
ANION GAP SERPL CALCULATED.4IONS-SCNC: 7 MMOL/L (CALC) (ref 7–17)
AST SERPL-CCNC: 18 U/L (ref 10–35)
BILIRUB SERPL-MCNC: 0.6 MG/DL (ref 0.2–1.2)
BUN SERPL-MCNC: 17 MG/DL (ref 7–25)
CALCIUM SERPL-MCNC: 9.4 MG/DL (ref 8.6–10.4)
CHLORIDE SERPL-SCNC: 98 MMOL/L (ref 98–110)
CHOLEST SERPL-MCNC: 233 MG/DL
CHOLEST/HDLC SERPL: 3.5 (CALC)
CO2 SERPL-SCNC: 28 MMOL/L (ref 20–32)
CREAT SERPL-MCNC: 0.62 MG/DL (ref 0.6–0.95)
EGFRCR SERPLBLD CKD-EPI 2021: 87 ML/MIN/1.73M2
ERYTHROCYTE [DISTWIDTH] IN BLOOD BY AUTOMATED COUNT: 13 % (ref 11–15)
EST. AVERAGE GLUCOSE BLD GHB EST-MCNC: 114 MG/DL
EST. AVERAGE GLUCOSE BLD GHB EST-SCNC: 6.3 MMOL/L
GLUCOSE SERPL-MCNC: 89 MG/DL (ref 65–99)
HBA1C MFR BLD: 5.6 %
HCT VFR BLD AUTO: 38.6 % (ref 35–45)
HDLC SERPL-MCNC: 67 MG/DL
HGB BLD-MCNC: 12.4 G/DL (ref 11.7–15.5)
LDLC SERPL CALC-MCNC: 141 MG/DL (CALC)
MCH RBC QN AUTO: 28.6 PG (ref 27–33)
MCHC RBC AUTO-ENTMCNC: 32.1 G/DL (ref 32–36)
MCV RBC AUTO: 89.1 FL (ref 80–100)
NONHDLC SERPL-MCNC: 166 MG/DL (CALC)
PLATELET # BLD AUTO: 176 THOUSAND/UL (ref 140–400)
PMV BLD REES-ECKER: 10.4 FL (ref 7.5–12.5)
POTASSIUM SERPL-SCNC: 4.6 MMOL/L (ref 3.5–5.3)
PROT SERPL-MCNC: 6.8 G/DL (ref 6.1–8.1)
RBC # BLD AUTO: 4.33 MILLION/UL (ref 3.8–5.1)
SODIUM SERPL-SCNC: 133 MMOL/L (ref 135–146)
TRIGL SERPL-MCNC: 125 MG/DL
WBC # BLD AUTO: 7.5 THOUSAND/UL (ref 3.8–10.8)

## 2025-06-09 ENCOUNTER — TELEPHONE (OUTPATIENT)
Dept: AUDIOLOGY | Facility: CLINIC | Age: 85
End: 2025-06-09
Payer: MEDICARE

## 2025-06-09 NOTE — TELEPHONE ENCOUNTER
Right hearing aid is not working; left one is working but the June 23 retention tail left aid was put on incorrectly. Patient will come for audiology assistant clean and check 6/23.

## 2025-06-13 ENCOUNTER — APPOINTMENT (OUTPATIENT)
Facility: CLINIC | Age: 85
End: 2025-06-13
Payer: MEDICARE

## 2025-06-13 VITALS
BODY MASS INDEX: 29.37 KG/M2 | SYSTOLIC BLOOD PRESSURE: 142 MMHG | WEIGHT: 172 LBS | HEIGHT: 64 IN | HEART RATE: 68 BPM | DIASTOLIC BLOOD PRESSURE: 84 MMHG | OXYGEN SATURATION: 97 %

## 2025-06-13 DIAGNOSIS — E23.2 DIABETES INSIPIDUS: ICD-10-CM

## 2025-06-13 DIAGNOSIS — Z00.00 MEDICARE ANNUAL WELLNESS VISIT, SUBSEQUENT: Primary | ICD-10-CM

## 2025-06-13 DIAGNOSIS — R25.1 TREMOR OF BOTH HANDS: ICD-10-CM

## 2025-06-13 DIAGNOSIS — E87.1 HYPONATREMIA: ICD-10-CM

## 2025-06-13 DIAGNOSIS — I63.9 CEREBROVASCULAR ACCIDENT (CVA), UNSPECIFIED MECHANISM (MULTI): ICD-10-CM

## 2025-06-13 DIAGNOSIS — R26.9 ABNORMAL GAIT: ICD-10-CM

## 2025-06-13 DIAGNOSIS — I10 ESSENTIAL HYPERTENSION: ICD-10-CM

## 2025-06-13 PROCEDURE — 1170F FXNL STATUS ASSESSED: CPT

## 2025-06-13 PROCEDURE — G0439 PPPS, SUBSEQ VISIT: HCPCS

## 2025-06-13 PROCEDURE — 3079F DIAST BP 80-89 MM HG: CPT

## 2025-06-13 PROCEDURE — 1036F TOBACCO NON-USER: CPT

## 2025-06-13 PROCEDURE — 1159F MED LIST DOCD IN RCRD: CPT

## 2025-06-13 PROCEDURE — 3077F SYST BP >= 140 MM HG: CPT

## 2025-06-13 RX ORDER — GABAPENTIN 300 MG/1
300 CAPSULE ORAL 3 TIMES DAILY
Qty: 90 CAPSULE | Refills: 3 | Status: SHIPPED | OUTPATIENT
Start: 2025-06-13 | End: 2025-10-11

## 2025-06-13 RX ORDER — METOPROLOL TARTRATE 25 MG/1
25 TABLET, FILM COATED ORAL 2 TIMES DAILY
Qty: 90 TABLET | Refills: 1 | Status: SHIPPED | OUTPATIENT
Start: 2025-06-13 | End: 2025-12-10

## 2025-06-13 ASSESSMENT — PATIENT HEALTH QUESTIONNAIRE - PHQ9
1. LITTLE INTEREST OR PLEASURE IN DOING THINGS: NOT AT ALL
2. FEELING DOWN, DEPRESSED OR HOPELESS: NOT AT ALL
SUM OF ALL RESPONSES TO PHQ9 QUESTIONS 1 AND 2: 0

## 2025-06-13 ASSESSMENT — ENCOUNTER SYMPTOMS
DEPRESSION: 0
OCCASIONAL FEELINGS OF UNSTEADINESS: 1
LOSS OF SENSATION IN FEET: 0

## 2025-06-13 ASSESSMENT — ACTIVITIES OF DAILY LIVING (ADL)
TAKING_MEDICATION: NEEDS ASSISTANCE
MANAGING_FINANCES: NEEDS ASSISTANCE
DOING_HOUSEWORK: NEEDS ASSISTANCE
BATHING: INDEPENDENT
DRESSING: INDEPENDENT
GROCERY_SHOPPING: NEEDS ASSISTANCE

## 2025-06-13 NOTE — PROGRESS NOTES
I reviewed and examined the patient. I was present for the key exam elements, and I fully participated in the patient's care. I discussed the management of the care with the resident. I have personally reviewed the pertinent labs and imaging, as well as recent notes, with the patient. I have reviewed the note above and agree with the resident's medical decision making as documented in the resident's note, in addition to the following comments / findings:     Agree with the rest of the plan outlined below by resident physician. No red flags.      The patient understands and agrees to the assessment and plan of care. Patient has also agreed to follow up and comply with the treatment and evaluation as recommended today. Patient was instructed to call the office at [phone] should questions arise regarding their treatment or care.     Tae Pichardo DO, FAOASM  Family Medicine   [address], [address]  [place] [address]     Tae Pichardo DO

## 2025-06-13 NOTE — ASSESSMENT & PLAN NOTE
Continue to follow with endo  Reviewed sodium level with patient  Declined repeat panel prior to endo appointment

## 2025-06-13 NOTE — ASSESSMENT & PLAN NOTE
No long term side effects   S/p 5 CVA   Cannot tolerate statins  Reviewed lipid panel with patient  Aware of results and continues to decline statin  Neurological status evaluated   Handicap placard given for mobility status

## 2025-06-13 NOTE — PROGRESS NOTES
Chief Complaint  Reason for Visit: Sol Gomez is an 85 y.o. female here for a Annual Exam.          Reviewed all medications by prescribing practitioner or clinical pharmacist (such as prescriptions, OTCs, herbal therapies and supplements) and documented in the medical record.    History of Present Illness  1.  Medicare wellness/physical exam.  Immunizations: TDAP up to date     2. DI   Follows with endocrinology   Continuing on desmopressin    3. CVA   S/p 5 CVA   No neurological side effects     4.  Tremor of both hands  On gabapentin 300  mg TID   Need refills    Review of Systems  All pertinent positive symptoms are included in the history of present illness.    All other systems have been reviewed and are negative and noncontributory to this patient's current ailments.    Past Medical History  She has a past medical history of Atrial fibrillation (Multi) (06/28/2024), CVA (cerebral vascular accident) (Multi) (12/03/2010), Diabetes insipidus (07/26/2024), Essential hypertension (06/28/2024), and Syncope and collapse (06/28/2024).    Surgical History  She has a past surgical history that includes MR angio head wo IV contrast (07/15/2012); MR angio head wo IV contrast (01/11/2013); Skin cancer excision; and Eye surgery.     Social History  She reports that she has never smoked. She has never been exposed to tobacco smoke. She has never used smokeless tobacco. She reports that she does not currently use alcohol after a past usage of about 1.0 standard drink of alcohol per week. She reports that she does not use drugs.    Family History[1]  Medications Prior to Visit[2]    Allergies  Aspirin, Propoxyphene, Salicylates, and Allergenic ext-tree pollen    Immunization History   Administered Date(s) Administered    Hep A / Hep B 09/19/2013    Td vaccine, age 7 years and older (TDVAX) 11/18/2009    Tdap vaccine, age 7 year and older (BOOSTRIX, ADACEL) 07/18/2019     Objective   Visit Vitals  /84   Pulse 68  "  Ht 1.626 m (5' 4\")   Wt 78 kg (172 lb)   SpO2 97%   BMI 29.52 kg/m²   OB Status Postmenopausal   Smoking Status Never   BSA 1.88 m²        BP Readings from Last 3 Encounters:   06/13/25 142/84   01/03/25 130/82   10/04/24 132/60      Wt Readings from Last 3 Encounters:   06/13/25 78 kg (172 lb)   01/03/25 76.2 kg (168 lb)   10/04/24 67.6 kg (149 lb)      Vision  No results found.    Medicare Wellness Information  Patient Self Assessment of Health Status  Patient Self Assessment: Excellent    Nutrition and Exercise  Current Diet: Well Balanced Diet  Adequate Fluid Intake: Yes  Caffeine: Yes  Exercise Frequency: No Exercise    Functional Ability/Level of Safety  Cognitive Impairment Observed: No cognitive impairment observed  Cognitive Impairment Reported: No cognitive impairment reported by patient or family    Home Safety Risk Factors: None    Relevant Results  Orders Only on 05/14/2025   Component Date Value    WHITE BLOOD CELL COUNT 06/06/2025 7.5     RED BLOOD CELL COUNT 06/06/2025 4.33     HEMOGLOBIN 06/06/2025 12.4     HEMATOCRIT 06/06/2025 38.6     MCV 06/06/2025 89.1     MCH 06/06/2025 28.6     MCHC 06/06/2025 32.1     RDW 06/06/2025 13.0     PLATELET COUNT 06/06/2025 176     MPV 06/06/2025 10.4     GLUCOSE 06/06/2025 89     UREA NITROGEN (BUN) 06/06/2025 17     CREATININE 06/06/2025 0.62     EGFR 06/06/2025 87     SODIUM 06/06/2025 133 (L)     POTASSIUM 06/06/2025 4.6     CHLORIDE 06/06/2025 98     CARBON DIOXIDE 06/06/2025 28     ELECTROLYTE BALANCE 06/06/2025 7     CALCIUM 06/06/2025 9.4     PROTEIN, TOTAL 06/06/2025 6.8     ALBUMIN 06/06/2025 4.4     BILIRUBIN, TOTAL 06/06/2025 0.6     ALKALINE PHOSPHATASE 06/06/2025 60     AST 06/06/2025 18     ALT 06/06/2025 13     HEMOGLOBIN A1c 06/06/2025 5.6     eAG (mg/dL) 06/06/2025 114     eAG (mmol/L) 06/06/2025 6.3     CHOLESTEROL, TOTAL 06/06/2025 233 (H)     HDL CHOLESTEROL 06/06/2025 67     TRIGLYCERIDES 06/06/2025 125     LDL-CHOLESTEROL 06/06/2025 141 (H) "     CHOL/HDLC RATIO 06/06/2025 3.5     NON HDL CHOLESTEROL 06/06/2025 166 (H)      The ASCVD Risk score (Crystal DK, et al., 2019) failed to calculate for the following reasons:    The 2019 ASCVD risk score is only valid for ages 40 to 79    Risk score cannot be calculated because patient has a medical history suggesting prior/existing ASCVD    Physical Exam  CONSTITUTIONAL - well nourished, well developed, looks like stated age, in no acute distress, not ill-appearing, and not tired appearing  SKIN - normal skin color and pigmentation, normal skin turgor without rash, lesions, or nodules visualized  HEAD - no trauma, normocephalic  EYES - pupils are equal and reactive to light, extraocular muscles are intact, and normal external exam  CHEST - clear to auscultation, no wheezing, no crackles and no rales, good effort  CARDIAC - regular rate and regular rhythm, no skipped beats, no murmur  EXTREMITIES - 1+ pitting edema worse on R than L  NEUROLOGICAL - L upper extremity tremor at rest,  strength weaker on R than L, osteoarthritic 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 on L, facial nerves intact, mild slurring of speech, weakness of lower extremities worse on R than L  PSYCHIATRIC - alert, pleasant and cordial, age-appropriate      Assessment and Plan  Problem List Items Addressed This Visit       CVA (cerebral vascular accident) (Multi)    No long term side effects   S/p 5 CVA   Cannot tolerate statins  Reviewed lipid panel with patient  Aware of results and continues to decline statin  Neurological status evaluated   Handicap placard given for mobility status           Essential hypertension    Refilled metoprolol  stable         Diabetes insipidus    Continue to follow with endo  Reviewed sodium level with patient  Declined repeat panel prior to endo appointment         Tremor of both hands    Refilled gabapentin at this time         Relevant Medications    gabapentin (Neurontin) 300 mg capsule    Medicare  annual wellness visit, subsequent - Primary     Other Visit Diagnoses         Abnormal gait        Relevant Orders    Disability Placard      Hyponatremia        Relevant Medications    metoprolol tartrate (Lopressor) 25 mg tablet          Patient Care Team:  Tae Pichardo DO as PCP - General (Family Medicine)         [1] No family history on file.  [2]   Outpatient Medications Prior to Visit   Medication Sig Dispense Refill    desmopressin (DDAVP) 0.1 mg tablet 0.1mg po bid 180 tablet 3    magnesium, amino acid chelate, 133 mg tablet Take 1 tablet (133 mg) by mouth 2 times a day.      gabapentin (Neurontin) 300 mg capsule Take 1 capsule (300 mg) by mouth 3 times a day. 90 capsule 3    metoprolol tartrate (Lopressor) 25 mg tablet TAKE 1 TABLET BY MOUTH TWICE A  tablet 0     No facility-administered medications prior to visit.

## 2025-06-19 NOTE — PROGRESS NOTES
HPI   84 yo presents in follow up for diabetes insipidus.  Pt also with multiple CVA's, htn, afib.  Pt's daughter, healthcare POA is here and assisting with history.     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  From pt's history it sounds that she had an in office water deprivation test that was positive.  It also sounds as if she had imaging of her pituitary and was started on DDAVP 0.1mg bid.  She has been stable on this dosage for years.  She only drinks fluids when thirsty.  She typically has to urinate once overnight.     Pt was hospitalized summer 2024 after syncopal episode/mental status change.  Found to have sodium-125 and a UTI.  Neuro/nephro were consulted on pt's case.  DDAVP was held until sodium normalized.  It was added back at 1/2 0.1mg bid ddavp dosage.  After discharge pt was having DI sx, dose increased to 1/2 0.1mg pill tid.  Repeat labs shortly after show sodium-140 aug 9, 2024.  Pt then went back to long term dose of ddavp of 0.1mg bid and has been at baseline.     Since last visit:  -had uti since last visit fall 2024 (see phone message) where we recommended 1/2 dose ddavp for 2 days, sodium repeated and wnl    -5/25: sodium-140    -pt having some swelling in legs, having more allergies, recently with more dry mouth  -pt has been taking ddavp 0.1mg bid, recently taking extra 1/2 pill   -June 2025 sodium-133 (pt was taking an extra pill per day)         Current Outpatient Medications:     desmopressin (DDAVP) 0.1 mg tablet, 0.1mg po bid, Disp: 180 tablet, Rfl: 3    gabapentin (Neurontin) 300 mg capsule, Take 1 capsule (300 mg) by mouth 3 times a day., Disp: 90 capsule, Rfl: 3    magnesium, amino acid chelate, 133 mg tablet, Take 1 tablet (133 mg) by mouth 2 times a day., Disp: , Rfl:     metoprolol tartrate (Lopressor) 25 mg tablet, Take 1 tablet (25 mg) by mouth 2 times a day., Disp: 90 tablet, Rfl: 1      Allergies as of 06/20/2025 - Reviewed  "06/20/2025   Allergen Reaction Noted    Aspirin Unknown 06/19/2024    Propoxyphene Other and Unknown 07/15/2019    Salicylates Unknown 01/16/2007    Allergenic ext-tree pollen Other 06/28/2024         Review of Systems   Cardiology: Lightheadedness-denies.  Chest pain-denies.  Leg edema + Palpitations-denies.  Respiratory: Cough-denies. Shortness of breath-denies.  Wheezing-denies.  Gastroenterology: Constipation-denies.  Diarrhea-denies.  Heartburn-denies.  Endocrinology: Cold intolerance-denies.  Heat intolerance-denies.  Sweats-denies.  Neurology: Headache-denies.  Tremor-denies.  Neuropathy in extremities-denies.  Psychology: Low energy-denies.  Irritability-denies.  Sleep disturbances-denies.      /67 (BP Location: Right arm, Patient Position: Sitting)   Pulse 60   Wt 77.1 kg (170 lb)   BMI 29.18 kg/m²       Labs:  Lab Results   Component Value Date    WBC 7.5 06/06/2025    NRBC 0.0 08/09/2024    RBC 4.33 06/06/2025    HGB 12.4 06/06/2025    HCT 38.6 06/06/2025     06/06/2025     Lab Results   Component Value Date    CALCIUM 9.4 06/06/2025    AST 18 06/06/2025    ALKPHOS 60 06/06/2025    BILITOT 0.6 06/06/2025    PROT 6.8 06/06/2025    ALBUMIN 4.4 06/06/2025     (L) 06/06/2025    K 4.6 06/06/2025    CL 98 06/06/2025    CO2 28 06/06/2025    ANIONGAP 7 06/06/2025    BUN 17 06/06/2025    CREATININE 0.62 06/06/2025    UREACREAUR 31.7 (H) 01/10/2020    GLUCOSE 89 06/06/2025    ALT 13 06/06/2025    EGFR 87 06/06/2025     Lab Results   Component Value Date    CHOL 233 (H) 06/06/2025    TRIG 125 06/06/2025    HDL 67 06/06/2025    LDLCALC 141 (H) 06/06/2025     No results found for: \"MICROALBCREA\"  Lab Results   Component Value Date    TSH 2.19 06/27/2024     Lab Results   Component Value Date    [id number] 480 06/27/2024     Lab Results   Component Value Date    HGBA1C 5.6 06/06/2025         Physical Exam   General Appearance: pleasant, cooperative, no acute distress  HEENT: no chemosis, no " proptosis, no lid lag, no lid retraction  Neck: no lymphadenopathy, no thyromegaly, no dominant thyroid nodules  Heart: no murmurs, regular rate and rhythm, S1 and S2  Lungs: no wheezes, no rhonci, no rales  Extremities: no lower extremity swelling      Assessment/Plan   1. Diabetes insipidus (Primary)  -continue ddavp 0.1mg bid, drink when thirsty  -if/when gets UTI consider taking 1/2 pill bid for 2 days and repeat labs  -pt goes to [place] over the winter     -work on taking otc allergy pill daily (suspect that is causing some issues with dry mouth/congestion)  -consider extra 1/2 pill if urination excessively  -repeat labs in next 2 months    -counseled pt/daughter for 30 min on fluid intake and how ddavp works (its about fluid balance more than sodium intake)    Follow Up:  Giuseppe 1 year    -labs/tests/notes reviewed  -reviewed and counseled patient on medication monitoring and side effects

## 2025-06-20 ENCOUNTER — APPOINTMENT (OUTPATIENT)
Dept: ENDOCRINOLOGY | Facility: CLINIC | Age: 85
End: 2025-06-20
Payer: MEDICARE

## 2025-06-20 VITALS
BODY MASS INDEX: 29.18 KG/M2 | WEIGHT: 170 LBS | SYSTOLIC BLOOD PRESSURE: 139 MMHG | HEART RATE: 60 BPM | DIASTOLIC BLOOD PRESSURE: 67 MMHG

## 2025-06-20 DIAGNOSIS — E87.1 HYPONATREMIA: ICD-10-CM

## 2025-06-20 DIAGNOSIS — E23.2 DIABETES INSIPIDUS: Primary | ICD-10-CM

## 2025-06-20 PROCEDURE — 3075F SYST BP GE 130 - 139MM HG: CPT | Performed by: INTERNAL MEDICINE

## 2025-06-20 PROCEDURE — 1036F TOBACCO NON-USER: CPT | Performed by: INTERNAL MEDICINE

## 2025-06-20 PROCEDURE — 1126F AMNT PAIN NOTED NONE PRSNT: CPT | Performed by: INTERNAL MEDICINE

## 2025-06-20 PROCEDURE — 99214 OFFICE O/P EST MOD 30 MIN: CPT | Performed by: INTERNAL MEDICINE

## 2025-06-20 PROCEDURE — 3078F DIAST BP <80 MM HG: CPT | Performed by: INTERNAL MEDICINE

## 2025-06-20 RX ORDER — DESMOPRESSIN ACETATE 0.1 MG/1
TABLET ORAL
Qty: 180 TABLET | Refills: 3 | Status: SHIPPED | OUTPATIENT
Start: 2025-06-20

## 2025-06-20 ASSESSMENT — PATIENT HEALTH QUESTIONNAIRE - PHQ9
SUM OF ALL RESPONSES TO PHQ9 QUESTIONS 1 & 2: 0
2. FEELING DOWN, DEPRESSED OR HOPELESS: NOT AT ALL
1. LITTLE INTEREST OR PLEASURE IN DOING THINGS: NOT AT ALL

## 2025-06-20 ASSESSMENT — PAIN SCALES - GENERAL: PAINLEVEL_OUTOF10: 0-NO PAIN

## 2025-06-23 ENCOUNTER — APPOINTMENT (OUTPATIENT)
Dept: ORTHOPEDIC SURGERY | Facility: CLINIC | Age: 85
End: 2025-06-23
Payer: MEDICARE

## 2025-06-23 DIAGNOSIS — G56.02 LEFT CARPAL TUNNEL SYNDROME: Primary | ICD-10-CM

## 2025-06-23 PROCEDURE — 99213 OFFICE O/P EST LOW 20 MIN: CPT | Performed by: ORTHOPAEDIC SURGERY

## 2025-06-23 PROCEDURE — 1125F AMNT PAIN NOTED PAIN PRSNT: CPT | Performed by: ORTHOPAEDIC SURGERY

## 2025-06-23 PROCEDURE — G8433 SCR FOR DEP NOT CPT DOC RSN: HCPCS | Performed by: ORTHOPAEDIC SURGERY

## 2025-06-23 PROCEDURE — 1159F MED LIST DOCD IN RCRD: CPT | Performed by: ORTHOPAEDIC SURGERY

## 2025-06-23 PROCEDURE — 20526 THER INJECTION CARP TUNNEL: CPT | Performed by: ORTHOPAEDIC SURGERY

## 2025-06-23 PROCEDURE — 1036F TOBACCO NON-USER: CPT | Performed by: ORTHOPAEDIC SURGERY

## 2025-06-23 PROCEDURE — 76942 ECHO GUIDE FOR BIOPSY: CPT | Performed by: ORTHOPAEDIC SURGERY

## 2025-06-23 PROCEDURE — 1160F RVW MEDS BY RX/DR IN RCRD: CPT | Performed by: ORTHOPAEDIC SURGERY

## 2025-06-23 RX ADMIN — METHYLPREDNISOLONE ACETATE 30 MG: 40 INJECTION, SUSPENSION INTRA-ARTICULAR; INTRALESIONAL; INTRAMUSCULAR; SOFT TISSUE at 08:50

## 2025-06-23 RX ADMIN — LIDOCAINE HYDROCHLORIDE 1 ML: 10 INJECTION, SOLUTION INFILTRATION; PERINEURAL at 08:50

## 2025-06-23 ASSESSMENT — PATIENT HEALTH QUESTIONNAIRE - PHQ9
1. LITTLE INTEREST OR PLEASURE IN DOING THINGS: NOT AT ALL
SUM OF ALL RESPONSES TO PHQ9 QUESTIONS 1 AND 2: 0
2. FEELING DOWN, DEPRESSED OR HOPELESS: NOT AT ALL

## 2025-06-23 ASSESSMENT — ENCOUNTER SYMPTOMS
FATIGUE: 0
ARTHRALGIAS: 1
BRUISES/BLEEDS EASILY: 0
JOINT SWELLING: 1
SHORTNESS OF BREATH: 0
CHILLS: 0
WHEEZING: 0
FEVER: 0
NUMBNESS: 1

## 2025-06-23 ASSESSMENT — PAIN SCALES - GENERAL: PAINLEVEL_OUTOF10: 8

## 2025-06-23 ASSESSMENT — PAIN - FUNCTIONAL ASSESSMENT: PAIN_FUNCTIONAL_ASSESSMENT: 0-10

## 2025-06-23 NOTE — PROGRESS NOTES
Reason for Appointment  Chief Complaint   Patient presents with    Left Wrist - Pain     History of Present Illness  Patient is a 85 y.o. female here today for follow-up evaluation of left wrist pain. We last saw the patient on 10/11/24 for a left carpal tunnel injection. Today She comes in with a caregiver and in a wheelchair. She states the last injection gave her good relief. Injection helped with the pain and burning type symptoms. Her symptoms have returned. Classic carpal tunnel symptoms. She is requesting a repeat injection today. No recent falls or injuries. No other changes in past medical history, allergies, or medications.    Medical History[1]    Surgical History[2]    Medication Documentation Review Audit       Reviewed by Violetta Paredes MA (Medical Assistant) on 06/23/25 at 0843      Medication Order Taking? Sig Documenting Provider Last Dose Status   Discontinued 06/20/25 1018   desmopressin (DDAVP) 0.1 mg tablet [id number] Yes 0.1mg po bid Manny Chin MD  Active   gabapentin (Neurontin) 300 mg capsule [id number] Yes Take 1 capsule (300 mg) by mouth 3 times a day. Rose Lundy MD  Active   magnesium, amino acid chelate, 133 mg tablet [id number] Yes Take 1 tablet (133 mg) by mouth 2 times a day. Historical Provider, MD Taking Active   metoprolol tartrate (Lopressor) 25 mg tablet [id number] Yes Take 1 tablet (25 mg) by mouth 2 times a day. Rose Lundy MD  Active                    RX Allergies[3]    Review of Systems   Constitutional:  Negative for chills, fatigue and fever.   Respiratory:  Negative for shortness of breath and wheezing.    Cardiovascular:  Negative for chest pain and leg swelling.   Musculoskeletal:  Positive for arthralgias and joint swelling.   Allergic/Immunologic: Negative for immunocompromised state.   Neurological:  Positive for numbness.   Hematological:  Does not bruise/bleed easily.       Exam   Severe thenar atrophy. Decreased composite flexion of the index and  long. Mild swelling in the carpal tunnel region. 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 Good pulses. positive tinel's right median nerve.   Assessment   Left carpal tunnel    Plan     Today we discussed conservative treatment. At this point, the patient is experiencing side: left pain and numbness that is consistent with carpal tunnel syndrome on clinical examination. We will do one cortisone injection into the left carpal tunnel region in hopes to calm their symptoms nicely. Pt understands the small risk of infection and warning signs including flare reaction. The patient can follow up with us if or when pain reoccurs. We discussed surgery again today but with her condition we would like to avoid surgery at this point.     On ultrasound sig swelling of the nerve was seen.     Patient ID: [id number] Gomez is a 85 y.o. female.    Hand / UE Inj/Asp: L carpal tunnel for carpal tunnel syndrome on 6/23/2025 8:50 AM  Indications: pain  Details: 25 G needle, ultrasound-guided  Medications: 1 mL lidocaine 10 mg/mL (1 %); 30 mg methylPREDNISolone acetate 40 mg/mL  Outcome: tolerated well, no immediate complications    After discussing the risks and benefits of the procedure we proceeded with the injection. Using ultrasound guidance we identified the median nerve and the ulnar artery, images obtained and saved. We gently aspirated and sterilely injected a mixture of 30 mg of DepoMedrol and 1 cc of 1% lidocaine into the left carpal tunnel. Pt tolerated the procedure well without any adverse effects.    Procedure, treatment alternatives, risks and benefits explained, specific risks discussed. Consent was given by the patient. Immediately prior to procedure a time out was called to verify the correct patient, procedure, equipment, support staff and site/side marked as required. Patient was prepped and draped in the usual sterile fashion.           ISisi, attest that this documentation has been prepared under the direction and in  the presence of Trent Ma MD.   By signing below, I, Trent Ma MD, personally performed the services described in this documentation. All medical record entries made by the scribe were at my direction and in my presence. I have reviewed the chart and agree that the record reflects my personal performance and is accurate and complete.         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

## 2025-06-24 RX ORDER — METHYLPREDNISOLONE ACETATE 40 MG/ML
30 INJECTION, SUSPENSION INTRA-ARTICULAR; INTRALESIONAL; INTRAMUSCULAR; SOFT TISSUE
Status: COMPLETED | OUTPATIENT
Start: 2025-06-23 | End: 2025-06-23

## 2025-06-24 RX ORDER — LIDOCAINE HYDROCHLORIDE 10 MG/ML
1 INJECTION, SOLUTION INFILTRATION; PERINEURAL
Status: COMPLETED | OUTPATIENT
Start: 2025-06-23 | End: 2025-06-23

## 2025-07-02 ENCOUNTER — TELEPHONE (OUTPATIENT)
Facility: CLINIC | Age: 85
End: 2025-07-02
Payer: MEDICARE

## 2025-07-02 DIAGNOSIS — N39.0 URINARY TRACT INFECTION WITHOUT HEMATURIA, SITE UNSPECIFIED: Primary | ICD-10-CM

## 2025-07-02 LAB
APPEARANCE UR: ABNORMAL
BACTERIA #/AREA URNS HPF: ABNORMAL /HPF
BILIRUB UR QL STRIP: NEGATIVE
COLOR UR: YELLOW
GLUCOSE UR QL STRIP: NEGATIVE
HGB UR QL STRIP: NEGATIVE
HYALINE CASTS #/AREA URNS LPF: ABNORMAL /LPF
KETONES UR QL STRIP: NEGATIVE
LEUKOCYTE ESTERASE UR QL STRIP: ABNORMAL
NITRITE UR QL STRIP: NEGATIVE
PH UR STRIP: 6 [PH] (ref 5–8)
PROT UR QL STRIP: NEGATIVE
RBC #/AREA URNS HPF: ABNORMAL /HPF
SERVICE CMNT-IMP: ABNORMAL
SP GR UR STRIP: 1.02 (ref 1–1.03)
SQUAMOUS #/AREA URNS HPF: ABNORMAL /HPF
WBC #/AREA URNS HPF: ABNORMAL /HPF

## 2025-07-02 NOTE — TELEPHONE ENCOUNTER
Patients daughter thinks she may have a bladder infection, she is requesting a lab order to be sent to the lab for culture. Please advise.

## 2025-07-03 ENCOUNTER — TELEPHONE (OUTPATIENT)
Facility: CLINIC | Age: 85
End: 2025-07-03
Payer: MEDICARE

## 2025-07-18 LAB
ANION GAP SERPL CALCULATED.4IONS-SCNC: 9 MMOL/L (CALC) (ref 7–17)
BUN SERPL-MCNC: 18 MG/DL (ref 7–25)
BUN/CREAT SERPL: NORMAL (CALC) (ref 6–22)
CALCIUM SERPL-MCNC: 9.2 MG/DL (ref 8.6–10.4)
CHLORIDE SERPL-SCNC: 103 MMOL/L (ref 98–110)
CO2 SERPL-SCNC: 29 MMOL/L (ref 20–32)
CREAT SERPL-MCNC: 0.75 MG/DL (ref 0.6–0.95)
EGFRCR SERPLBLD CKD-EPI 2021: 78 ML/MIN/1.73M2
GLUCOSE SERPL-MCNC: 78 MG/DL (ref 65–99)
POTASSIUM SERPL-SCNC: 4.1 MMOL/L (ref 3.5–5.3)
SODIUM SERPL-SCNC: 141 MMOL/L (ref 135–146)

## 2025-08-26 ENCOUNTER — APPOINTMENT (OUTPATIENT)
Dept: CARDIOLOGY | Facility: HOSPITAL | Age: 85
End: 2025-08-26
Payer: MEDICARE

## 2025-08-26 ENCOUNTER — HOSPITAL ENCOUNTER (EMERGENCY)
Facility: HOSPITAL | Age: 85
Discharge: HOME | End: 2025-08-26
Attending: STUDENT IN AN ORGANIZED HEALTH CARE EDUCATION/TRAINING PROGRAM
Payer: MEDICARE

## 2025-08-26 ENCOUNTER — APPOINTMENT (OUTPATIENT)
Dept: RADIOLOGY | Facility: HOSPITAL | Age: 85
End: 2025-08-26
Payer: MEDICARE

## 2025-08-26 VITALS
HEIGHT: 66 IN | TEMPERATURE: 96.8 F | BODY MASS INDEX: 27.32 KG/M2 | OXYGEN SATURATION: 99 % | SYSTOLIC BLOOD PRESSURE: 133 MMHG | DIASTOLIC BLOOD PRESSURE: 118 MMHG | RESPIRATION RATE: 16 BRPM | HEART RATE: 67 BPM | WEIGHT: 170 LBS

## 2025-08-26 DIAGNOSIS — R53.1 GENERALIZED WEAKNESS: Primary | ICD-10-CM

## 2025-08-26 LAB
ALBUMIN SERPL BCP-MCNC: 4.4 G/DL (ref 3.4–5)
ALP SERPL-CCNC: 59 U/L (ref 33–136)
ALT SERPL W P-5'-P-CCNC: 21 U/L (ref 7–45)
ANION GAP SERPL CALCULATED.3IONS-SCNC: 10 MMOL/L (ref 10–20)
APPEARANCE UR: CLEAR
AST SERPL W P-5'-P-CCNC: 30 U/L (ref 9–39)
BACTERIA #/AREA URNS AUTO: ABNORMAL /HPF
BASOPHILS # BLD AUTO: 0.03 X10*3/UL (ref 0–0.1)
BASOPHILS NFR BLD AUTO: 0.4 %
BILIRUB SERPL-MCNC: 0.4 MG/DL (ref 0–1.2)
BILIRUB UR STRIP.AUTO-MCNC: NEGATIVE MG/DL
BUN SERPL-MCNC: 19 MG/DL (ref 6–23)
CALCIUM SERPL-MCNC: 9.6 MG/DL (ref 8.6–10.3)
CARDIAC TROPONIN I PNL SERPL HS: 5 NG/L (ref 0–13)
CARDIAC TROPONIN I PNL SERPL HS: 6 NG/L (ref 0–13)
CHLORIDE SERPL-SCNC: 96 MMOL/L (ref 98–107)
CO2 SERPL-SCNC: 29 MMOL/L (ref 21–32)
COLOR UR: ABNORMAL
CREAT SERPL-MCNC: 0.67 MG/DL (ref 0.5–1.05)
EGFRCR SERPLBLD CKD-EPI 2021: 86 ML/MIN/1.73M*2
EOSINOPHIL # BLD AUTO: 0.07 X10*3/UL (ref 0–0.4)
EOSINOPHIL NFR BLD AUTO: 1 %
ERYTHROCYTE [DISTWIDTH] IN BLOOD BY AUTOMATED COUNT: 13 % (ref 11.5–14.5)
GLUCOSE SERPL-MCNC: 131 MG/DL (ref 74–99)
GLUCOSE UR STRIP.AUTO-MCNC: NORMAL MG/DL
HCT VFR BLD AUTO: 39.4 % (ref 36–46)
HGB BLD-MCNC: 13 G/DL (ref 12–16)
HYALINE CASTS #/AREA URNS AUTO: ABNORMAL /LPF
IMM GRANULOCYTES # BLD AUTO: 0.03 X10*3/UL (ref 0–0.5)
IMM GRANULOCYTES NFR BLD AUTO: 0.4 % (ref 0–0.9)
KETONES UR STRIP.AUTO-MCNC: NEGATIVE MG/DL
LEUKOCYTE ESTERASE UR QL STRIP.AUTO: ABNORMAL
LIPASE SERPL-CCNC: 20 U/L (ref 9–82)
LYMPHOCYTES # BLD AUTO: 1.19 X10*3/UL (ref 0.8–3)
LYMPHOCYTES NFR BLD AUTO: 17.4 %
MAGNESIUM SERPL-MCNC: 2.39 MG/DL (ref 1.6–2.4)
MCH RBC QN AUTO: 29.3 PG (ref 26–34)
MCHC RBC AUTO-ENTMCNC: 33 G/DL (ref 32–36)
MCV RBC AUTO: 89 FL (ref 80–100)
MONOCYTES # BLD AUTO: 0.58 X10*3/UL (ref 0.05–0.8)
MONOCYTES NFR BLD AUTO: 8.5 %
MUCOUS THREADS #/AREA URNS AUTO: ABNORMAL /LPF
NEUTROPHILS # BLD AUTO: 4.94 X10*3/UL (ref 1.6–5.5)
NEUTROPHILS NFR BLD AUTO: 72.3 %
NITRITE UR QL STRIP.AUTO: NEGATIVE
NRBC BLD-RTO: 0 /100 WBCS (ref 0–0)
PH UR STRIP.AUTO: 6 [PH]
PLATELET # BLD AUTO: 205 X10*3/UL (ref 150–450)
POTASSIUM SERPL-SCNC: 4.4 MMOL/L (ref 3.5–5.3)
PROT SERPL-MCNC: 7 G/DL (ref 6.4–8.2)
PROT UR STRIP.AUTO-MCNC: ABNORMAL MG/DL
RBC # BLD AUTO: 4.43 X10*6/UL (ref 4–5.2)
RBC # UR STRIP.AUTO: NEGATIVE MG/DL
RBC #/AREA URNS AUTO: ABNORMAL /HPF
SODIUM SERPL-SCNC: 131 MMOL/L (ref 136–145)
SP GR UR STRIP.AUTO: 1.02
SQUAMOUS #/AREA URNS AUTO: ABNORMAL /HPF
UROBILINOGEN UR STRIP.AUTO-MCNC: NORMAL MG/DL
WBC # BLD AUTO: 6.8 X10*3/UL (ref 4.4–11.3)
WBC #/AREA URNS AUTO: ABNORMAL /HPF
WBC CLUMPS #/AREA URNS AUTO: ABNORMAL /HPF

## 2025-08-26 PROCEDURE — 83735 ASSAY OF MAGNESIUM: CPT | Performed by: PHYSICIAN ASSISTANT

## 2025-08-26 PROCEDURE — 80053 COMPREHEN METABOLIC PANEL: CPT | Performed by: PHYSICIAN ASSISTANT

## 2025-08-26 PROCEDURE — 84484 ASSAY OF TROPONIN QUANT: CPT | Performed by: PHYSICIAN ASSISTANT

## 2025-08-26 PROCEDURE — 87086 URINE CULTURE/COLONY COUNT: CPT | Mod: TRILAB | Performed by: PHYSICIAN ASSISTANT

## 2025-08-26 PROCEDURE — 71045 X-RAY EXAM CHEST 1 VIEW: CPT

## 2025-08-26 PROCEDURE — 71045 X-RAY EXAM CHEST 1 VIEW: CPT | Performed by: RADIOLOGY

## 2025-08-26 PROCEDURE — 36415 COLL VENOUS BLD VENIPUNCTURE: CPT | Performed by: PHYSICIAN ASSISTANT

## 2025-08-26 PROCEDURE — 99285 EMERGENCY DEPT VISIT HI MDM: CPT | Performed by: STUDENT IN AN ORGANIZED HEALTH CARE EDUCATION/TRAINING PROGRAM

## 2025-08-26 PROCEDURE — 93005 ELECTROCARDIOGRAM TRACING: CPT

## 2025-08-26 PROCEDURE — 81001 URINALYSIS AUTO W/SCOPE: CPT | Performed by: PHYSICIAN ASSISTANT

## 2025-08-26 PROCEDURE — 83690 ASSAY OF LIPASE: CPT | Performed by: PHYSICIAN ASSISTANT

## 2025-08-26 PROCEDURE — 85025 COMPLETE CBC W/AUTO DIFF WBC: CPT | Performed by: PHYSICIAN ASSISTANT

## 2025-08-26 ASSESSMENT — PAIN - FUNCTIONAL ASSESSMENT: PAIN_FUNCTIONAL_ASSESSMENT: 0-10

## 2025-08-26 ASSESSMENT — PAIN SCALES - GENERAL: PAINLEVEL_OUTOF10: 0 - NO PAIN

## 2025-08-28 LAB
ATRIAL RATE: 63 BPM
P AXIS: 23 DEGREES
P OFFSET: 206 MS
P ONSET: 167 MS
PR INTERVAL: 122 MS
Q ONSET: 228 MS
QRS COUNT: 10 BEATS
QRS DURATION: 68 MS
QT INTERVAL: 438 MS
QTC CALCULATION(BAZETT): 448 MS
QTC FREDERICIA: 445 MS
R AXIS: -6 DEGREES
T AXIS: -25 DEGREES
T OFFSET: 447 MS
VENTRICULAR RATE: 63 BPM

## 2025-08-29 LAB
BACTERIA UR CULT: ABNORMAL
BACTERIA UR CULT: ABNORMAL
LABORATORY COMMENT REPORT: ABNORMAL

## 2025-08-30 ENCOUNTER — RESULTS FOLLOW-UP (OUTPATIENT)
Dept: PHARMACY | Facility: HOSPITAL | Age: 85
End: 2025-08-30
Payer: MEDICARE

## 2026-06-19 ENCOUNTER — APPOINTMENT (OUTPATIENT)
Facility: CLINIC | Age: 86
End: 2026-06-19
Payer: MEDICARE